# Patient Record
Sex: FEMALE | Race: WHITE | NOT HISPANIC OR LATINO | Employment: OTHER | URBAN - METROPOLITAN AREA
[De-identification: names, ages, dates, MRNs, and addresses within clinical notes are randomized per-mention and may not be internally consistent; named-entity substitution may affect disease eponyms.]

---

## 2017-04-19 ENCOUNTER — TELEPHONE (OUTPATIENT)
Dept: RHEUMATOLOGY | Facility: CLINIC | Age: 72
End: 2017-04-19

## 2017-04-19 NOTE — TELEPHONE ENCOUNTER
Patient called regarding her Plaquenil medication. She said it is needing a Tier exception through her insurance in order to get the cost reduced. Patient is currently out of the medication.     Pharmacy benefit is through [employer] and the number to call is [phone].     Message routed to rheumatology nurses.     Pierre Banuelos RN

## 2017-04-20 NOTE — TELEPHONE ENCOUNTER
Pt returned call, discussed that the tier exception was complete and plaquenil is now at tier 1 for the rest of the year.  Did let her know that her insurance will send her a letter and pharmacy will be made aware when they run her insurance to fill prescription.  Pt understands.  Made her aware that when she called, this was the first we were hearing about this.  Clarified that insurance company had the wrong fax number.  Pt understands and appreciative of fast turn around.    Savanna Fuentes RN  Rheumatology Clinic

## 2017-04-20 NOTE — TELEPHONE ENCOUNTER
Pt is calling again about the below note. Pt states her insurance company has sent multiple requests regarding this and she will be leaving for out of town on Saturday for 6 months and would like a call back today to discuss this. Please contact pt at [phone].

## 2017-04-20 NOTE — TELEPHONE ENCOUNTER
Called [employer] and tier exception was granted.  Prescription has been dropped to Tier 1 for the rest of the benefit year.    Left message requesting that pt return call.    Savanna Fuentes RN  Rheumatology Clinic

## 2017-04-21 NOTE — TELEPHONE ENCOUNTER
Prior Authorization Approval    Authorization Effective Date: 12/31/2016  Authorization Expiration Date: 12/31/2017  Medication: hydroxychloroquine (PLAQUENIL) 200 MG tablet- Tier Exception Approved  Approved Dose/Quantity:   Reference #: [id number]   Insurance Company: [employer] - Phone [phone] Fax [phone]  Expected CoPay: $0.03     CoPay Card Available:      Foundation Assistance Needed:    Which Pharmacy is filling the prescription (Not needed for infusion/clinic administered): [facility] [address] [place] [address] AT [facility]  Pharmacy Notified: Yes  Patient Notified: Yes

## 2017-05-26 ENCOUNTER — TRANSFERRED RECORDS (OUTPATIENT)
Dept: HEALTH INFORMATION MANAGEMENT | Facility: CLINIC | Age: 72
End: 2017-05-26

## 2018-03-13 DIAGNOSIS — M32.9 SYSTEMIC LUPUS ERYTHEMATOSUS (H): ICD-10-CM

## 2018-03-13 DIAGNOSIS — M32.9 SYSTEMIC LUPUS ERYTHEMATOSUS, UNSPECIFIED SLE TYPE, UNSPECIFIED ORGAN INVOLVEMENT STATUS (H): Primary | ICD-10-CM

## 2018-03-13 NOTE — LETTER
Radha Ireland,    Regular eye exams are required while taking Hydroxychloroquine (Plaquenil). These may be yearly or as determined by your eye specialist.    Although vision problems and loss of sight while taking hydroxchloroquine are very rare, notify your doctor if you notice changes in your vision. Visual changes experienced early on the medication or seen early during regular eye exams usually improve after stopping the medication.     Eye exams should be completed by an ophthalmologist who is experienced in monitoring for Hydroxchloroquine toxicity.    Exam may include visual field testing and slit lamp exam or other testing as determined by the ophthalmologist.     We received a refill request from your pharmacy. Your Hydroxychloroquine prescription has been SENT TO [facility] FOR RF AUTHORIZATION. Please request your eye clinic to fax or mail a copy of your eye exam report to our clinic indicating that testing was completed for toxicity screening.    Sincerely,  Rheumatology Staff

## 2018-03-14 RX ORDER — HYDROXYCHLOROQUINE SULFATE 200 MG/1
200 TABLET, FILM COATED ORAL 2 TIMES DAILY
Qty: 180 TABLET | Refills: 0 | Status: SHIPPED | OUTPATIENT
Start: 2018-03-14 | End: 2018-05-25

## 2018-03-14 NOTE — TELEPHONE ENCOUNTER
PLAQUENIL) 200 MG  Last Written Prescription Date:  12/22/16  Last Fill Quantity: 180,   # refills: 3  Last Office Visit : 12/22/16  Future Office visit:  5/25/18  ( 1 YR F/U?)   EYE EXAM   Routing refill request to provider for review/approval because: Last    OVERDUE RTC   SEE CX HISTORY   NO EYE EXAM  LETTER SENT  90 DAY RF?

## 2018-03-19 ENCOUNTER — TELEPHONE (OUTPATIENT)
Dept: RHEUMATOLOGY | Facility: CLINIC | Age: 73
End: 2018-03-19

## 2018-03-19 NOTE — TELEPHONE ENCOUNTER
Patient had a recent refill of plaquenil, and because of the way it is ordered she has a copay of $250. She was told by the pharmacist at [facility] where the prescription went, that the doctor just needs to change the tier level of the medication in order for the copay to decrease as she has been paying. Please call patient when the reorder is complete or for further questions at [phone].

## 2018-03-28 NOTE — TELEPHONE ENCOUNTER
Pt called to check on status of plaquenil tier.    See previous note.    Please callback pt at [phone].

## 2018-03-29 NOTE — TELEPHONE ENCOUNTER
Returned patient call after speaking to pharmacy-[facility]. Patient advised, per [employer] information, patient has a high insur deductible, and that has to be met, so that is why the charge for the med is more. Patient will call and speak to her insur company then. She is also reminded about due for eye exam. She states she has an appt in June for that, but reports she is up to date, had an eye exam last June. Advised we never received a copy of that report to up date her chart. She will call and have that faxed to our office. Fax number is given. She appreciated the call and information about her medication.

## 2018-04-03 NOTE — TELEPHONE ENCOUNTER
Called and spoke to patient. This is a  rheumatology patient that states she was advised her copay went up with her Plaquenil medication. She isnt sure if it needs to be prior authorized, or what is needed. After review of patient chart, it looks like she needed a Prior Authorization for the Plaquenil last year at this time. Patient is given our clinic fax number, and she will call her insur to have the forms for Prior Auth faxed. Explaned to patient will then route to PA team to initiate PA on patient behalf. She is appreciative of the call back and verbalizes understanding of what is needed. Encouraged her to call if further questions or concerns.

## 2018-04-03 NOTE — TELEPHONE ENCOUNTER
----- Message from Silvestre Spear LPN sent at 4/3/2018  8:50 AM CDT -----  Regarding: FW: Hydroxychloroquine Rx      ----- Message -----     From: Beti De Leon     Sent: 4/3/2018   7:44 AM       To: Silvestre Spear LPN  Subject: RE: Hydroxychloroquine Rx                        This needs to go to [facility].   ----- Message -----     From: Silvestre Spear LPN     Sent: 4/2/2018  10:58 AM       To: Beti De Leon  Subject: FW: Hydroxychloroquine Rx                            ----- Message -----     From: Riya Gleason LPN     Sent: 4/2/2018  10:48 AM       To: Silvestre Spear LPN  Subject: FW: Hydroxychloroquine Rx                            ----- Message -----     From: Moi Jaramillo MD     Sent: 3/30/2018   5:25 PM       To: Riya Mckeon LPN  Subject: Hydroxychloroquine Rx                            See prior notes. Somehow she got my office phone from the insurance company and called me. She was told all we had to do was to request a change in tier of this Rx and it would be covered at $6.00.  This doesn't jive with my experience in how they respond to our requests. I am glad to help her however I can, but need to have someone determine exactly what we can do. gb

## 2018-04-26 ENCOUNTER — TELEPHONE (OUTPATIENT)
Dept: RHEUMATOLOGY | Facility: CLINIC | Age: 73
End: 2018-04-26

## 2018-04-26 DIAGNOSIS — M32.9 SYSTEMIC LUPUS ERYTHEMATOSUS, UNSPECIFIED SLE TYPE, UNSPECIFIED ORGAN INVOLVEMENT STATUS (H): Primary | ICD-10-CM

## 2018-04-26 RX ORDER — PREDNISONE 5 MG/1
TABLET ORAL
Qty: 30 TABLET | Refills: 0 | Status: SHIPPED | OUTPATIENT
Start: 2018-04-26 | End: 2018-10-04

## 2018-04-26 NOTE — TELEPHONE ENCOUNTER
Pt states that she was doing well on plaquenil, when she stopped 2 weeks ago, as her cost for the medication went up to over $200, and pt has been trying to get a tier exception.    She states that she has joint pain all day now, since she has been off.  She has a swollen right hand, with stiffness that lasts all day.  She is unable to do a lot now due to the stiffness and joint pain.  This is getting worse. Pt needs to avoid NSAIDs due to mild CKD.    We did discuss getting her back on her plaquenil, discussed [facility] as they have a coupon that can help with cost for at least the next month.  She will go get that filled.  But she is wondering if there is something to help with the pain and stiffness in her joints now.  She is wondering if prednisone would help her.     Pt was last seen 12/2016:  70 y/o female with +KATIE, +Ds DNA, hypocomplementemia, malar rash, alopecia and sicca symptoms, arthritis and mild renal insufficiency.   She has SLE with secondary sjogren's syndrome (+siccs sxs, +schirmer test, - minor salivary gland biopsy).   Her symptoms are controlled reasonably on Plaquenil. Her sicca sx are stable with biotene and artificial tears. Eye and dental exams are current.   2. Recurrent dyspnea, evaluated by outside Cardiologist and Pulmonologist in past. By her description sounds as though she may have diastolic dysfunction. This is stable at this time.   3. Recurrent falls with hx of remote CVA, and ? Changes in speech. Exam is non focal. I do not think this is SLE related as before  4. Primary OA of hands, with known DJD elsewhere with persistent symptoms  5. Mild CKD, stable creatinines  Plan: Discussed in detail with the patient   1. Continue plaquenil 200 mg po BID. Discussed opthalmology exam once a year while on plaquenil.   2. Labs ordered today  3. Follow up with PCP as needed  4. Continue agents for sicca sx.   5. Return in 12 months   6. She has had the flu shot   7. We discussed symptomatic  treatment for DJD, avoidance of NSAIDs  Moi Jaramillo MD       Pt will see Dr. Jaramillo on 5/28, rescheduled due to provider illness in December and January.  Provider is currently out of the office this week.  Will send to on call provider.    Savanna Fuentes RN  Rheumatology Clinic

## 2018-04-26 NOTE — TELEPHONE ENCOUNTER
Date: 4/26/2018    Time of Call: 3:58 PM     Diagnosis:  SLE     [ VORB ] Ordering provider: Dr. Loco   Order: Prednisone 5 mg tablets, take 3 tabs/day x 5 days, if not better call, if better take 2 tab/day x 3-5 days, then 1 tab/day x 3-5 days, then stop     Order received by: Savanna Fuentes RN     Follow-up/additional notes: Call if not better in 5 days.    Savanna Fuentes RN  Rheumatology Clinic

## 2018-04-27 NOTE — TELEPHONE ENCOUNTER
Called and spoke with pt regarding prednisone taper. Discussed I will keep working on tier exception for plaquenil.    Savanna Fuentes RN  Rheumatology Clinic

## 2018-05-11 ENCOUNTER — TELEPHONE (OUTPATIENT)
Dept: RHEUMATOLOGY | Facility: CLINIC | Age: 73
End: 2018-05-11

## 2018-05-11 NOTE — TELEPHONE ENCOUNTER
[facility] Call Center    Phone Message    May a detailed message be left on voicemail: no    Reason for Call: Other: Returning Savanna's call from yesterday     Action Taken: Message routed to:  [facility] Rheum

## 2018-05-15 NOTE — TELEPHONE ENCOUNTER
Left message letting pt know that I will call her when I hear something.      Savanna Fuentes RN  Rheumatology Clinic

## 2018-05-23 NOTE — TELEPHONE ENCOUNTER
Called and spoke with pt, advised her that she was granted a tier exception for her plaquenil.  She has been dropped to tier 2 from tier 3.  Pt is very grateful, did purchase last months dose with [facility], so it made is slightly more affordable.    Savanna Fuentes RN  Rheumatology Clinic

## 2018-05-25 ENCOUNTER — OFFICE VISIT (OUTPATIENT)
Dept: RHEUMATOLOGY | Facility: CLINIC | Age: 73
End: 2018-05-25
Attending: INTERNAL MEDICINE
Payer: MEDICARE

## 2018-05-25 VITALS
DIASTOLIC BLOOD PRESSURE: 79 MMHG | OXYGEN SATURATION: 98 % | SYSTOLIC BLOOD PRESSURE: 124 MMHG | HEIGHT: 64 IN | HEART RATE: 89 BPM

## 2018-05-25 DIAGNOSIS — M32.9 SYSTEMIC LUPUS ERYTHEMATOSUS, UNSPECIFIED SLE TYPE, UNSPECIFIED ORGAN INVOLVEMENT STATUS (H): ICD-10-CM

## 2018-05-25 DIAGNOSIS — Z79.899 ENCOUNTER FOR LONG-TERM CURRENT USE OF MEDICATION: Primary | ICD-10-CM

## 2018-05-25 PROCEDURE — G0463 HOSPITAL OUTPT CLINIC VISIT: HCPCS | Mod: ZF

## 2018-05-25 RX ORDER — HYDROXYCHLOROQUINE SULFATE 200 MG/1
200 TABLET, FILM COATED ORAL 2 TIMES DAILY
Qty: 180 TABLET | Refills: 3 | Status: SHIPPED | OUTPATIENT
Start: 2018-05-25 | End: 2019-05-25

## 2018-05-25 ASSESSMENT — PAIN SCALES - GENERAL: PAINLEVEL: NO PAIN (0)

## 2018-05-25 NOTE — NURSING NOTE
"Chief Complaint   Patient presents with     RECHECK     Lupus follow up     ./79  Pulse 89  Ht 1.626 m (5' 4\")  SpO2 98%  SUSY ANAYA CMA    "

## 2018-05-25 NOTE — LETTER
5/25/2018      RE: Shu Duckworth  [address] [place] [address]       Rheumatology Visit     Shu Duckworth MRN# [id number]   YOB: 1945 Age: 72 year old     Date of Visit: May 25, 2018  Primary care provider: Johnson Arnold MD          Assessment and Plan:   71 y/o female with +KATIE, +Ds DNA, hypocomplementemia, malar rash, alopecia and sicca symptoms, arthritis and mild renal insufficiency.   She has SLE with secondary sjogren's syndrome (+siccs sxs, +schirmer test, - minor salivary gland biopsy).   Her symptoms are controlled reasonably on Plaquenil. She did have exacerbation of symptoms off the medication due to insurance issues, and is better back on it.  Her sicca sx are stable with biotene and artificial tears. Eye and dental exams are current.   2. Recurrent dyspnea, evaluated by outside Cardiologist and Pulmonologist in past. By her description sounds as though she may have diastolic dysfunction. This is stable at this time.   3. Recurrent falls with hx of remote CVA, and ? Changes in speech. Exam is non focal. I do not think this is SLE related as before  4. Primary OA of hands, with known DJD elsewhere with persistent symptoms  5. Mild CKD, stable creatinines  Plan: Discussed in detail with the patient   1. Continue plaquenil 200 mg po BID. Discussed opthalmology exam once a year while on plaquenil.  Scheduled in June   2. Labs current per Care Everywhere  3. Follow up with PCP as needed  4. Continue agents for sicca sx.   5. Return in 6 months   6. She has had the flu shot   7. We discussed symptomatic treatment for DJD, avoidance of NSAIDs  Moi Jaramillo MD             History of Present Illness:   71 y/o female who returns for follow-up of SLE and secondary Sjogrens (diagnosed in 2010). She was last seen by me in December 2016.    HISTORY CARRIED FORWARD:   To review, she initially presented with nail dystrophy to her dermatologist who thought she may have a lichen  "planopilaris of the nails but given her + KATIE at 1/640 speckeled pattern and + FH of Sjogrens advised her to obtain a rheumatology evaluation. Her daughter has Primary seropositive Sjogrens and also now has autoimmune pancreatitis. At her initial rheumatology evaluation in June 2010, she had significant sicca symptoms (subjective and objective), a faint malar rash, along with a + FH of Sjogrens overall features suggestive of Sjogren's. She was refered to ophthalmology for a schirmer test and a MSG biopsy. Schirmer was abnormal; biopsy showed chronic changes but did not meet criteria for Sjogrens. Labs notable for positive KATIE (1.2), positive dsDNA (35), low C4 (9), negative SAMANTHA, RF, cryoglobulins. She was started on Plaquenil 200 mg BID in July 2010.   Previously her primary physician evaluated her for new symptoms of shortness of breath with activities. She was noticing that when she climbed the stairs or did routine household chores, she was becoming more short of breath than usual. She denies any chest pain, orthopnea, PND, syncope or cough. This work-up has been done at [facility] and [facility] and records are unavailable to us. She states the cardiologist told her the heart muscle at the top of the heart didn't relax to let fluid in from the bottom. She is on a beta blocker and HCTZ; apparently had another medication, ? Diuretic she did not tolerated. She has intermittent dyspnea not changing. She recently saw her PCP. She has mild stable increased Creatinine.  She recounts a history of intermittent falls that dates back to at least 2010. She was evaluated at that time by a neurologist. MRI apparently consistent with prior CVA. She notes no recent increase in falls but yesterday \"stumbled\" while playing with her grandchildren. She denies dizziness, vertigo, presyncope, syncope. She has had others note a change in her speech, ? Slurred. No bowel or bladder sx.  INTERVAL HISTORY:  Lab last visit a " year ago was stable with mildly low C4, increased Cr of 1.17.  She missed an interval appointment.  She called recently that her Plaquenil was put on a different tier and she couldn't afford it.  She was requiring some Prednisone for joint pain. We just got a tier exception less than a week ago and she is already improving.  She had a lot more arthritis in hands R>L with swelling.   Her joint pain has generally otherwise been stable. She has intermittent pain in her elbows L>R with prior L elbow arthroplasty, hands and some AM stiffness which is improved after only a short time.  She has pain and a lump R 2nd DIP consistent with Heberden's node with other non tender nodes. She has chronic L ankle pain with prior fracture and surgery then removal of hardware.   She has continued sicca symptoms which are improved by using Biotene and artificial tears. She sees her dentist regularly.   Patient denies Raynaud's, pleuritic chest pain or mouth ulcers. No recent fever, chills or rigors.   They will go to [place] again this winter.       Review of Systems:   Review Of Systems  Skin: negative  Eyes: negative  Ears/Nose/Throat: see HPI  Respiratory: No shortness of breath, dyspnea on exertion, cough, or hemoptysis  Cardiovascular: negative  Gastrointestinal: negative  Genitourinary: negative  Musculoskeletal: see HPI  Neurologic: negative  Psychiatric: negative  Hematologic/Lymphatic/Immunologic: negative  Endocrine: negative          Past Medical History:   No past medical history on file.    Patient Active Problem List    Diagnosis Date Noted     SLE (systemic lupus erythematosus) (H) 10/12/2011     Priority: Medium     Encounter for long-term current use of medication 10/12/2011     Priority: Medium     Problem list name updated by automated process. Provider to review       Sjogren's syndrome (H) 10/12/2011     Priority: Medium             Past Surgical History:   No past surgical history on file.          Social History:  "    Social History   Substance Use Topics     Smoking status: Former Smoker     Packs/day: 0.50     Smokeless tobacco: Never Used     Alcohol use Yes      Comment: Occasional use             Family History:     Family History   Problem Relation Age of Onset     Sjogren's Daughter             Allergies:     Allergies   Allergen Reactions     Dust Mite Extract      Sneezing, itchy nose.     Penicillins Rash     Sulfa Drugs Rash             Medications:     Current Outpatient Prescriptions   Medication Sig Dispense Refill     Acetaminophen (TYLENOL PO) Take 1,000 mg by mouth every 6 hours as needed for mild pain or fever       atenolol (TENORMIN) 25 MG tablet 2 tablets daily. One in the morning, one at bedtime.       ClonAZEPAM (KLONOPIN) 0.5 MG tablet Take 3 tabs at night as needed       cyclobenzaprine (FLEXERIL) 10 MG tablet Take 1 tablet 3 times daily as needed       hydrochlorothiazide (HYDRODIURIL) 50 MG tablet Take 25 mg by mouth daily.       hydroxychloroquine (PLAQUENIL) 200 MG tablet Take 1 tablet (200 mg) by mouth 2 times daily *PLEASE, SCHEDULE EYE EXAM* 180 tablet 0     levothyroxine (SYNTHROID, LEVOTHROID) 88 MCG tablet Take one daily       methylphenidate (CONCERTA) 54 MG CR tablet Take 1 tablet by mouth daily.       mometasone (NASONEX) 50 MCG/ACT nasal spray 1 spray in each nostril once daily       Multiple Vitamins-Minerals (CENTRUM SILVER PO) Take  by mouth.       pantoprazole (PROTONIX) 40 MG enteric coated tablet Take 1 tablet by mouth 2 times daily.       predniSONE (DELTASONE) 5 MG tablet Take 3 tabs(15mg) daily x 5 days, if not better call, if better 2 tabs(10mg) daily x 3-5 days, then 1 tab(5 mg) daily x 3-5 days then stop. 30 tablet 0     ranitidine (ZANTAC) 150 MG tablet Take one at bedtime daily       simvastatin (ZOCOR) 20 MG tablet Take one tab daily              Physical Exam:   /79  Pulse 89  Ht 1.626 m (5' 4\")  SpO2 98%  Constitutional: WD-WN-WG cooperative   Eyes: nl EOM, " conjunctiva, sclera   ENT: nl external ears, nose, throat   No mucous membrane lesions, diminished saliva pool   Neck: no mass or thyroid enlargement   GI: no ABD mass or tenderness, no HSM   Lymph: no cervical, supraclavicular, inguinal or epitrochlear nodes   MS: All TMJ, neck, shoulder, elbow, wrist, MCP/PIP/DIP, spine, hip, knee, ankle, and foot MTP/IP joints were examined and found without definite active synovitis. She has a mildly tender Heberden's node R 2nd finger with other developed non tender Heberden and Giselle nodes. No instability L thumb IP. 1+S and pain with finger curl R 2nd and 3rd fingers. Lacks 10+ degrees extension L elbow. Bilat TKR scars. No dactylitis, tenosynovitis, enthesopathy   Skin: no nail pitting, alopecia, rash, nodules or lesions   Neuro: nl cranial nerves, strength   Psych: nl affect.           Data:     Lab Results   Component Value Date    WBC 7.3 12/22/2016    WBC 5.3 12/23/2015    WBC 5.2 12/22/2014    HGB 13.6 12/22/2016    HGB 13.2 12/23/2015    HGB 13.8 12/22/2014    HCT 42.1 12/22/2016    HCT 41.0 12/23/2015    HCT 42.6 12/22/2014    MCV 95 12/22/2016    MCV 95 12/23/2015    MCV 96 12/22/2014     12/22/2016     12/23/2015     12/22/2014     Lab Results   Component Value Date    BUN 14 06/13/2012    BUN 22 05/16/2012    BUN 21 06/16/2010     No components found for: SEDRATE  Lab Results   Component Value Date    TSH 1.85 06/16/2010     Lab Results   Component Value Date    AST 25 12/22/2016    AST 22 12/23/2015    AST 23 12/22/2014    ALT 27 12/22/2016    ALT 24 12/23/2015    ALT 30 12/22/2014    ALKPHOS 104 05/16/2012     Reviewed Rheumatology lab flowsheet        Moi Jaramillo MD

## 2018-05-25 NOTE — MR AVS SNAPSHOT
After Visit Summary   5/25/2018    Shu Duckworth    MRN: [id number]           Patient Information     Date Of Birth          1945        Visit Information        Provider Department      5/25/2018 12:30 PM Moi Jaramillo MD [facility] Rhe[facility]ology        Today's Diagnoses     Encounter for long-term current use of medication    -  1    Systemic lupus erythematosus, unspecified SLE type, unspecified organ involvement status (H)           Follow-ups after your visit        Follow-up notes from your care team     Return in about 6 months (around 11/25/2018).      Who to contact     If you have questions or need follow up information about today's clinic visit or your schedule please contact [facility] RHEUMATOLOGY directly at [phone].  Normal or non-critical lab and imaging results will be communicated to you by SFOXhart, letter or phone within 4 business days after the clinic has received the results. If you do not hear from us within 7 days, please contact the clinic through SFOXhart or phone. If you have a critical or abnormal lab result, we will notify you by phone as soon as possible.  Submit refill requests through Topadmit or call your pharmacy and they will forward the refill request to us. Please allow 3 business days for your refill to be completed.          Additional Information About Your Visit        MyChart Information     Topadmit gives you secure access to your electronic health record. If you see a primary care provider, you can also send messages to your care team and make appointments. If you have questions, please call your primary care clinic.  If you do not have a primary care provider, please call [phone] and they will assist you.        Care EveryWhere ID     This is your Care EveryWhere ID. This could be used by other organizations to access your [facility] medical records  [id number]        Your Vitals Were     Pulse Height Pulse Oximetry             89 1.626 m (5'  "4\") 98%          Blood Pressure from Last 3 Encounters:   05/25/18 124/79   12/22/16 130/81   12/23/15 119/44    Weight from Last 3 Encounters:   12/22/16 93.9 kg (207 lb)   12/23/15 93.9 kg (207 lb)   06/24/15 91.2 kg (201 lb)              Today, you had the following     No orders found for display         Where to get your medicines      These medications were sent to [id number] - [place], [place] - [address] AT [facility] [address]  [address], [place] [address]     Phone:  [phone]     hydroxychloroquine 200 MG tablet          Primary Care Provider Office Phone # Fax #    Johnson EDGARDO Arnold MD, [phone]       [address] [address]        Equal Access to Services     HECTOR CAMPOS AH: Hadii aad ku hadasho Soomaali, waaxda luqadaha, qaybta kaalmada adeegyada, waxay idiin hayaan samson thompson . So [facility] [phone].    ATENCIÓN: Si olivia penny, tiene a salgado disposición servicios gratuitos de asistencia lingüística. Llame al [phone].    We comply with applicable federal civil rights laws and [place] laws. We do not discriminate on the basis of race, color, national origin, age, disability, sex, sexual orientation, or gender identity.            Thank you!     Thank you for choosing [facility] RHEUMATOLOGY  for your care. Our goal is always to provide you with excellent care. Hearing back from our patients is one way we can continue to improve our services. Please take a few minutes to complete the written survey that you may receive in the mail after your visit with us. Thank you!             Your Updated Medication List - Protect others around you: Learn how to safely use, store and throw away your medicines at www.disposemymeds.org.          This list is accurate as of 5/25/18  1:25 PM.  Always use your most recent med list.                   Brand Name Dispense Instructions for use " Diagnosis    atenolol 25 MG tablet    TENORMIN     2 tablets daily. One in the morning, one at bedtime.    SLE (systemic lupus erythematosus) (H), Encounter for long-term (current) use of other medications       CENTRUM SILVER PO      Take  by mouth.        clonazePAM 0.5 MG tablet    klonoPIN     Take 3 tabs at night as needed    SLE (systemic lupus erythematosus) (H), Encounter for long-term (current) use of other medications       CONCERTA 54 MG CR tablet   Generic drug:  methylphenidate ER      Take 1 tablet by mouth daily.        FLEXERIL 10 MG tablet   Generic drug:  cyclobenzaprine      Take 1 tablet 3 times daily as needed    SLE (systemic lupus erythematosus) (H), Encounter for long-term (current) use of other medications       hydrochlorothiazide 50 MG tablet    HYDRODIURIL     Take 25 mg by mouth daily.    SLE (systemic lupus erythematosus) (H), Encounter for long-term (current) use of other medications       hydroxychloroquine 200 MG tablet    PLAQUENIL    180 tablet    Take 1 tablet (200 mg) by mouth 2 times daily *PLEASE, SCHEDULE EYE EXAM*    Systemic lupus erythematosus, unspecified SLE type, unspecified organ involvement status (H)       levothyroxine 88 MCG tablet    SYNTHROID/LEVOTHROID     Take one daily    SLE (systemic lupus erythematosus) (H), Encounter for long-term (current) use of other medications       NASONEX 50 MCG/ACT spray   Generic drug:  mometasone      1 spray in each nostril once daily    SLE (systemic lupus erythematosus) (H), Encounter for long-term (current) use of other medications       predniSONE 5 MG tablet    DELTASONE    30 tablet    Take 3 tabs(15mg) daily x 5 days, if not better call, if better 2 tabs(10mg) daily x 3-5 days, then 1 tab(5 mg) daily x 3-5 days then stop.    Systemic lupus erythematosus, unspecified SLE type, unspecified organ involvement status (H)       PROTONIX 40 MG EC tablet   Generic drug:  pantoprazole      Take 1 tablet by mouth 2 times daily.     SLE (systemic lupus erythematosus) (H), Encounter for long-term (current) use of other medications       simvastatin 20 MG tablet    ZOCOR     Take one tab daily    SLE (systemic lupus erythematosus) (H), Encounter for long-term (current) use of other medications       TYLENOL PO      Take 1,000 mg by mouth every 6 hours as needed for mild pain or fever        ZANTAC 150 MG tablet   Generic drug:  ranitidine      Take one at bedtime daily    SLE (systemic lupus erythematosus) (H), Encounter for long-term (current) use of other medications

## 2018-05-25 NOTE — PROGRESS NOTES
Rheumatology Visit     Shu Duckworth MRN# [id number]   YOB: 1945 Age: 72 year old     Date of Visit: May 25, 2018  Primary care provider: Johnson Arnold MD          Assessment and Plan:   71 y/o female with +KATIE, +Ds DNA, hypocomplementemia, malar rash, alopecia and sicca symptoms, arthritis and mild renal insufficiency.   She has SLE with secondary sjogren's syndrome (+siccs sxs, +schirmer test, - minor salivary gland biopsy).   Her symptoms are controlled reasonably on Plaquenil. She did have exacerbation of symptoms off the medication due to insurance issues, and is better back on it.  Her sicca sx are stable with biotene and artificial tears. Eye and dental exams are current.   2. Recurrent dyspnea, evaluated by outside Cardiologist and Pulmonologist in past. By her description sounds as though she may have diastolic dysfunction. This is stable at this time.   3. Recurrent falls with hx of remote CVA, and ? Changes in speech. Exam is non focal. I do not think this is SLE related as before  4. Primary OA of hands, with known DJD elsewhere with persistent symptoms  5. Mild CKD, stable creatinines  Plan: Discussed in detail with the patient   1. Continue plaquenil 200 mg po BID. Discussed opthalmology exam once a year while on plaquenil.  Scheduled in June   2. Labs current per Care Everywhere  3. Follow up with PCP as needed  4. Continue agents for sicca sx.   5. Return in 6 months   6. She has had the flu shot   7. We discussed symptomatic treatment for DJD, avoidance of NSAIDs  Moi Jaramillo MD             History of Present Illness:   71 y/o female who returns for follow-up of SLE and secondary Sjogrens (diagnosed in 2010). She was last seen by me in December 2016.    HISTORY CARRIED FORWARD:   To review, she initially presented with nail dystrophy to her dermatologist who thought she may have a lichen planopilaris of the nails but given her + KATIE at 1/640 speckeled pattern and + FH of  "Sjogrens advised her to obtain a rheumatology evaluation. Her daughter has Primary seropositive Sjogrens and also now has autoimmune pancreatitis. At her initial rheumatology evaluation in June 2010, she had significant sicca symptoms (subjective and objective), a faint malar rash, along with a + FH of Sjogrens overall features suggestive of Sjogren's. She was refered to ophthalmology for a schirmer test and a MSG biopsy. Schirmer was abnormal; biopsy showed chronic changes but did not meet criteria for Sjogrens. Labs notable for positive KATIE (1.2), positive dsDNA (35), low C4 (9), negative SAMANTHA, RF, cryoglobulins. She was started on Plaquenil 200 mg BID in July 2010.   Previously her primary physician evaluated her for new symptoms of shortness of breath with activities. She was noticing that when she climbed the stairs or did routine household chores, she was becoming more short of breath than usual. She denies any chest pain, orthopnea, PND, syncope or cough. This work-up has been done at [facility] and [facility] and records are unavailable to us. She states the cardiologist told her the heart muscle at the top of the heart didn't relax to let fluid in from the bottom. She is on a beta blocker and HCTZ; apparently had another medication, ? Diuretic she did not tolerated. She has intermittent dyspnea not changing. She recently saw her PCP. She has mild stable increased Creatinine.  She recounts a history of intermittent falls that dates back to at least 2010. She was evaluated at that time by a neurologist. MRI apparently consistent with prior CVA. She notes no recent increase in falls but yesterday \"stumbled\" while playing with her grandchildren. She denies dizziness, vertigo, presyncope, syncope. She has had others note a change in her speech, ? Slurred. No bowel or bladder sx.  INTERVAL HISTORY:  Lab last visit a year ago was stable with mildly low C4, increased Cr of 1.17.  She missed an interval " appointment.  She called recently that her Plaquenil was put on a different tier and she couldn't afford it.  She was requiring some Prednisone for joint pain. We just got a tier exception less than a week ago and she is already improving.  She had a lot more arthritis in hands R>L with swelling.   Her joint pain has generally otherwise been stable. She has intermittent pain in her elbows L>R with prior L elbow arthroplasty, hands and some AM stiffness which is improved after only a short time.  She has pain and a lump R 2nd DIP consistent with Heberden's node with other non tender nodes. She has chronic L ankle pain with prior fracture and surgery then removal of hardware.   She has continued sicca symptoms which are improved by using Biotene and artificial tears. She sees her dentist regularly.   Patient denies Raynaud's, pleuritic chest pain or mouth ulcers. No recent fever, chills or rigors.   They will go to [place] again this winter.       Review of Systems:   Review Of Systems  Skin: negative  Eyes: negative  Ears/Nose/Throat: see HPI  Respiratory: No shortness of breath, dyspnea on exertion, cough, or hemoptysis  Cardiovascular: negative  Gastrointestinal: negative  Genitourinary: negative  Musculoskeletal: see HPI  Neurologic: negative  Psychiatric: negative  Hematologic/Lymphatic/Immunologic: negative  Endocrine: negative          Past Medical History:   No past medical history on file.    Patient Active Problem List    Diagnosis Date Noted     SLE (systemic lupus erythematosus) (H) 10/12/2011     Priority: Medium     Encounter for long-term current use of medication 10/12/2011     Priority: Medium     Problem list name updated by automated process. Provider to review       Sjogren's syndrome (H) 10/12/2011     Priority: Medium             Past Surgical History:   No past surgical history on file.          Social History:     Social History   Substance Use Topics     Smoking status: Former Smoker      "Packs/day: 0.50     Smokeless tobacco: Never Used     Alcohol use Yes      Comment: Occasional use             Family History:     Family History   Problem Relation Age of Onset     Sjogren's Daughter             Allergies:     Allergies   Allergen Reactions     Dust Mite Extract      Sneezing, itchy nose.     Penicillins Rash     Sulfa Drugs Rash             Medications:     Current Outpatient Prescriptions   Medication Sig Dispense Refill     Acetaminophen (TYLENOL PO) Take 1,000 mg by mouth every 6 hours as needed for mild pain or fever       atenolol (TENORMIN) 25 MG tablet 2 tablets daily. One in the morning, one at bedtime.       ClonAZEPAM (KLONOPIN) 0.5 MG tablet Take 3 tabs at night as needed       cyclobenzaprine (FLEXERIL) 10 MG tablet Take 1 tablet 3 times daily as needed       hydrochlorothiazide (HYDRODIURIL) 50 MG tablet Take 25 mg by mouth daily.       hydroxychloroquine (PLAQUENIL) 200 MG tablet Take 1 tablet (200 mg) by mouth 2 times daily *PLEASE, SCHEDULE EYE EXAM* 180 tablet 0     levothyroxine (SYNTHROID, LEVOTHROID) 88 MCG tablet Take one daily       methylphenidate (CONCERTA) 54 MG CR tablet Take 1 tablet by mouth daily.       mometasone (NASONEX) 50 MCG/ACT nasal spray 1 spray in each nostril once daily       Multiple Vitamins-Minerals (CENTRUM SILVER PO) Take  by mouth.       pantoprazole (PROTONIX) 40 MG enteric coated tablet Take 1 tablet by mouth 2 times daily.       predniSONE (DELTASONE) 5 MG tablet Take 3 tabs(15mg) daily x 5 days, if not better call, if better 2 tabs(10mg) daily x 3-5 days, then 1 tab(5 mg) daily x 3-5 days then stop. 30 tablet 0     ranitidine (ZANTAC) 150 MG tablet Take one at bedtime daily       simvastatin (ZOCOR) 20 MG tablet Take one tab daily              Physical Exam:   /79  Pulse 89  Ht 1.626 m (5' 4\")  SpO2 98%  Constitutional: WD-WN-WG cooperative   Eyes: nl EOM, conjunctiva, sclera   ENT: nl external ears, nose, throat   No mucous membrane " lesions, diminished saliva pool   Neck: no mass or thyroid enlargement   GI: no ABD mass or tenderness, no HSM   Lymph: no cervical, supraclavicular, inguinal or epitrochlear nodes   MS: All TMJ, neck, shoulder, elbow, wrist, MCP/PIP/DIP, spine, hip, knee, ankle, and foot MTP/IP joints were examined and found without definite active synovitis. She has a mildly tender Heberden's node R 2nd finger with other developed non tender Heberden and Giselle nodes. No instability L thumb IP. 1+S and pain with finger curl R 2nd and 3rd fingers. Lacks 10+ degrees extension L elbow. Bilat TKR scars. No dactylitis, tenosynovitis, enthesopathy   Skin: no nail pitting, alopecia, rash, nodules or lesions   Neuro: nl cranial nerves, strength   Psych: nl affect.           Data:     Lab Results   Component Value Date    WBC 7.3 12/22/2016    WBC 5.3 12/23/2015    WBC 5.2 12/22/2014    HGB 13.6 12/22/2016    HGB 13.2 12/23/2015    HGB 13.8 12/22/2014    HCT 42.1 12/22/2016    HCT 41.0 12/23/2015    HCT 42.6 12/22/2014    MCV 95 12/22/2016    MCV 95 12/23/2015    MCV 96 12/22/2014     12/22/2016     12/23/2015     12/22/2014     Lab Results   Component Value Date    BUN 14 06/13/2012    BUN 22 05/16/2012    BUN 21 06/16/2010     No components found for: SEDRATE  Lab Results   Component Value Date    TSH 1.85 06/16/2010     Lab Results   Component Value Date    AST 25 12/22/2016    AST 22 12/23/2015    AST 23 12/22/2014    ALT 27 12/22/2016    ALT 24 12/23/2015    ALT 30 12/22/2014    ALKPHOS 104 05/16/2012     Reviewed Rheumatology lab flowsheet    Moi Jaramillo

## 2018-06-01 ENCOUNTER — TRANSFERRED RECORDS (OUTPATIENT)
Dept: HEALTH INFORMATION MANAGEMENT | Facility: CLINIC | Age: 73
End: 2018-06-01

## 2018-10-04 ENCOUNTER — OFFICE VISIT (OUTPATIENT)
Dept: RHEUMATOLOGY | Facility: CLINIC | Age: 73
End: 2018-10-04
Attending: INTERNAL MEDICINE
Payer: MEDICARE

## 2018-10-04 VITALS
SYSTOLIC BLOOD PRESSURE: 111 MMHG | BODY MASS INDEX: 36.35 KG/M2 | OXYGEN SATURATION: 99 % | RESPIRATION RATE: 16 BRPM | DIASTOLIC BLOOD PRESSURE: 65 MMHG | HEIGHT: 64 IN | HEART RATE: 95 BPM | WEIGHT: 212.9 LBS

## 2018-10-04 DIAGNOSIS — M32.8 OTHER FORMS OF SYSTEMIC LUPUS ERYTHEMATOSUS, UNSPECIFIED ORGAN INVOLVEMENT STATUS (H): ICD-10-CM

## 2018-10-04 DIAGNOSIS — Z79.899 ENCOUNTER FOR LONG-TERM CURRENT USE OF MEDICATION: Primary | ICD-10-CM

## 2018-10-04 DIAGNOSIS — Z79.899 ENCOUNTER FOR LONG-TERM CURRENT USE OF MEDICATION: ICD-10-CM

## 2018-10-04 PROCEDURE — 36415 COLL VENOUS BLD VENIPUNCTURE: CPT | Performed by: INTERNAL MEDICINE

## 2018-10-04 PROCEDURE — 86160 COMPLEMENT ANTIGEN: CPT | Performed by: INTERNAL MEDICINE

## 2018-10-04 PROCEDURE — G0463 HOSPITAL OUTPT CLINIC VISIT: HCPCS | Mod: ZF

## 2018-10-04 PROCEDURE — 86225 DNA ANTIBODY NATIVE: CPT | Performed by: INTERNAL MEDICINE

## 2018-10-04 RX ORDER — DEXTROAMPHETAMINE SACCHARATE, AMPHETAMINE ASPARTATE, DEXTROAMPHETAMINE SULFATE AND AMPHETAMINE SULFATE 5; 5; 5; 5 MG/1; MG/1; MG/1; MG/1
30 TABLET ORAL 2 TIMES DAILY
COMMUNITY
Start: 2018-09-24

## 2018-10-04 RX ORDER — METOPROLOL SUCCINATE 50 MG/1
50 TABLET, EXTENDED RELEASE ORAL DAILY
COMMUNITY
Start: 2018-06-19

## 2018-10-04 RX ORDER — TRAMADOL HYDROCHLORIDE 50 MG/1
50 TABLET ORAL EVERY 6 HOURS PRN
COMMUNITY
Start: 2018-10-30

## 2018-10-04 RX ORDER — ROPINIROLE 1 MG/1
TABLET, FILM COATED ORAL
COMMUNITY
Start: 2018-07-30

## 2018-10-04 RX ORDER — LEVOTHYROXINE SODIUM 100 UG/1
100 TABLET ORAL DAILY
Refills: 3 | COMMUNITY
Start: 2018-07-25

## 2018-10-04 RX ORDER — SERTRALINE HYDROCHLORIDE 100 MG/1
150 TABLET, FILM COATED ORAL EVERY MORNING
COMMUNITY
Start: 2018-07-30

## 2018-10-04 ASSESSMENT — PAIN SCALES - GENERAL: PAINLEVEL: NO PAIN (1)

## 2018-10-04 NOTE — LETTER
10/4/2018       RE: Shu Duckworth  [address]  [facility] [address]     Dear Colleague,    Thank you for referring your patient, Shu Duckworth, to the [facility] RHEUMATOLOGY at [facility]. Please see a copy of my visit note below.    Rheumatology Visit     Shu Duckworth MRN# [id number]   YOB: 1945 Age: 73 year old     Date of Visit: October 4, 2018  Primary care provider: Johnson Arnold MD          Assessment and Plan:   72 y/o female with +KATIE, +Ds DNA, hypocomplementemia, malar rash, alopecia and sicca symptoms, arthritis and mild renal insufficiency.   She has SLE with secondary sjogren's syndrome (+siccs sxs, +schirmer test, - minor salivary gland biopsy).   Her symptoms are controlled reasonably on Plaquenil. She did have exacerbation of symptoms off the medication due to insurance issues, and is better back on it.  Her sicca sx are stable with biotene and artificial tears. Eye and dental exams are current.   2. Recurrent dyspnea, evaluated by outside Cardiologist and Pulmonologist in past. By her description sounds as though she may have diastolic dysfunction. This is stable at this time.   3. Recurrent falls with hx of remote CVA, and ? Changes in speech. Exam is non focal. I do not think this is SLE related as before  4. Primary OA of hands, with known DJD elsewhere with persistent symptoms  5. Mild CKD, stable creatinines  6. S/P surgery for cecal volvulus  Plan: Discussed in detail with the patient   1. Continue plaquenil 200 mg po BID. Discussed opthalmology exam once a year while on plaquenil.     2. Labs current per Care Everywhere. We will do complements and DNA today and write  3. Follow up with PCP as needed  4. Continue agents for sicca sx.   5. Return in 6 months   6. She has had the flu shot this fall; she wonders about Shingles shot; she had by records Zostrix in 2012. Recommended she discuss with PCP next visit and consider  Shingrix as booster/more effective vaccine; it is in shortage right now  7. We discussed symptomatic treatment for DJD, avoidance of NSAIDs  Moi Jaramillo MD           History of Present Illness:   74 y/o female who returns for follow-up of SLE and secondary Sjogrens (diagnosed in 2010). She was last seen by me in May 2018.    HISTORY CARRIED FORWARD:   To review, she initially presented with nail dystrophy to her dermatologist who thought she may have a lichen planopilaris of the nails but given her + KATIE at 1/640 speckeled pattern and + FH of Sjogrens advised her to obtain a rheumatology evaluation. Her daughter has Primary seropositive Sjogrens and also now has autoimmune pancreatitis. At her initial rheumatology evaluation in June 2010, she had significant sicca symptoms (subjective and objective), a faint malar rash, along with a + FH of Sjogrens overall features suggestive of Sjogren's. She was refered to ophthalmology for a schirmer test and a MSG biopsy. Schirmer was abnormal; biopsy showed chronic changes but did not meet criteria for Sjogrens. Labs notable for positive KATIE (1.2), positive dsDNA (35), low C4 (9), negative SAMANTHA, RF, cryoglobulins. She was started on Plaquenil 200 mg BID in July 2010.   Previously her primary physician evaluated her for new symptoms of shortness of breath with activities. She was noticing that when she climbed the stairs or did routine household chores, she was becoming more short of breath than usual. She denies any chest pain, orthopnea, PND, syncope or cough. This work-up has been done at [facility] and [facility] and records are unavailable to us. She states the cardiologist told her the heart muscle at the top of the heart didn't relax to let fluid in from the bottom. She is on a beta blocker and HCTZ; apparently had another medication, ? Diuretic she did not tolerated. She has intermittent dyspnea not changing. She recently saw her PCP. She has mild stable  "increased Creatinine.  She recounts a history of intermittent falls that dates back to at least 2010. She was evaluated at that time by a neurologist. MRI apparently consistent with prior CVA. She notes no recent increase in falls but yesterday \"stumbled\" while playing with her grandchildren. She denies dizziness, vertigo, presyncope, syncope. She has had others note a change in her speech, ? Slurred. No bowel or bladder sx.  INTERVAL HISTORY:  She had a cecal volvulus requiring surgery at [facility] in June; she recovered well.   Lab over a year ago was stable with mildly low C4, normal C3 and DNA all stable.  She had recent lab thru PCP that was stable.   She remains on Plaquenil without SE. Eye exam current.  Her joint pain has generally otherwise been stable. She has intermittent pain in her elbows L>R with prior L elbow arthroplasty, hands and some AM stiffness which is improved after only a short time.  She has pain and a lump R 2nd DIP consistent with Heberden's node with other non tender nodes. She has chronic L ankle pain with prior fracture and surgery then removal of hardware.  She has neck and back pain. She takes Tramadol not more than one per day.  She has continued sicca symptoms which are improved by using Biotene and artificial tears. She sees her dentist regularly.   Patient denies Raynaud's, pleuritic chest pain or mouth ulcers. No recent fever, chills or rigors.   They will go to [place] again this winter.       Review of Systems:   Review Of Systems  Skin: negative  Eyes: negative  Ears/Nose/Throat: see HPI  Respiratory: No shortness of breath, dyspnea on exertion, cough, or hemoptysis  Cardiovascular: negative  Gastrointestinal: see HPI  Genitourinary: negative  Musculoskeletal: see HPI  Neurologic: negative  Psychiatric: negative  Hematologic/Lymphatic/Immunologic: negative  Endocrine: negative          Past Medical History:   No past medical history on file.    Patient Active Problem List    " Diagnosis Date Noted     SLE (systemic lupus erythematosus) (H) 10/12/2011     Priority: Medium     Encounter for long-term current use of medication 10/12/2011     Priority: Medium     Problem list name updated by automated process. Provider to review       Sjogren's syndrome (H) 10/12/2011     Priority: Medium             Past Surgical History:   No past surgical history on file.          Social History:     Social History   Substance Use Topics     Smoking status: Former Smoker     Packs/day: 0.50     Smokeless tobacco: Never Used     Alcohol use Yes      Comment: Occasional use             Family History:     Family History   Problem Relation Age of Onset     Sjogren's Daughter             Allergies:     Allergies   Allergen Reactions     Dust Mite Extract      Sneezing, itchy nose.     Penicillins Rash     Sulfa Drugs Rash             Medications:     Current Outpatient Prescriptions   Medication Sig Dispense Refill     Acetaminophen (TYLENOL PO) Take 1,000 mg by mouth every 6 hours as needed for mild pain or fever       atenolol (TENORMIN) 25 MG tablet 2 tablets daily. One in the morning, one at bedtime.       ClonAZEPAM (KLONOPIN) 0.5 MG tablet Take 3 tabs at night as needed       cyclobenzaprine (FLEXERIL) 10 MG tablet Take 1 tablet 3 times daily as needed       hydrochlorothiazide (HYDRODIURIL) 50 MG tablet Take 25 mg by mouth daily.       hydroxychloroquine (PLAQUENIL) 200 MG tablet Take 1 tablet (200 mg) by mouth 2 times daily *PLEASE, SCHEDULE EYE EXAM* 180 tablet 3     levothyroxine (SYNTHROID, LEVOTHROID) 88 MCG tablet Take one daily       methylphenidate (CONCERTA) 54 MG CR tablet Take 1 tablet by mouth daily.       mometasone (NASONEX) 50 MCG/ACT nasal spray 1 spray in each nostril once daily       Multiple Vitamins-Minerals (CENTRUM SILVER PO) Take  by mouth.       pantoprazole (PROTONIX) 40 MG enteric coated tablet Take 1 tablet by mouth 2 times daily.       predniSONE (DELTASONE) 5 MG tablet Take  "3 tabs(15mg) daily x 5 days, if not better call, if better 2 tabs(10mg) daily x 3-5 days, then 1 tab(5 mg) daily x 3-5 days then stop. 30 tablet 0     ranitidine (ZANTAC) 150 MG tablet Take one at bedtime daily       simvastatin (ZOCOR) 20 MG tablet Take one tab daily              Physical Exam:   /65 (BP Location: Right arm, Patient Position: Sitting, Cuff Size: Adult Large)  Pulse 95  Resp 16  Ht 1.626 m (5' 4\")  Wt 96.6 kg (212 lb 14.4 oz)  SpO2 99%  BMI 36.54 kg/m2  Constitutional: WD-WN-WG cooperative   Eyes: nl EOM, conjunctiva, sclera   ENT: nl external ears, nose, throat   No mucous membrane lesions, diminished saliva pool   Neck: no mass or thyroid enlargement   GI: no ABD mass or tenderness, no HSM   Lymph: no cervical, supraclavicular, inguinal or epitrochlear nodes   MS: All TMJ, neck, shoulder, elbow, wrist, MCP/PIP/DIP, spine, hip, knee, ankle, and foot MTP/IP joints were examined and found without definite active synovitis. She has a mildly tender Heberden's node R 2nd finger with other developed non tender Heberden and Giselle nodes. No instability L thumb IP. 1+S and pain with finger curl R 2nd and 3rd fingers. Lacks 10+ degrees extension L elbow. Bilat TKR scars. No dactylitis, tenosynovitis, enthesopathy   Skin: no nail pitting, alopecia, rash, nodules or lesions   Neuro: nl cranial nerves, strength   Psych: nl affect.           Data:     Lab Results   Component Value Date    WBC 7.3 12/22/2016    WBC 5.3 12/23/2015    WBC 5.2 12/22/2014    HGB 13.6 12/22/2016    HGB 13.2 12/23/2015    HGB 13.8 12/22/2014    HCT 42.1 12/22/2016    HCT 41.0 12/23/2015    HCT 42.6 12/22/2014    MCV 95 12/22/2016    MCV 95 12/23/2015    MCV 96 12/22/2014     12/22/2016     12/23/2015     12/22/2014     Lab Results   Component Value Date    BUN 14 06/13/2012    BUN 22 05/16/2012    BUN 21 06/16/2010     No components found for: SEDRATE  Lab Results   Component Value Date    TSH 1.85 " 06/16/2010     Lab Results   Component Value Date    AST 25 12/22/2016    AST 22 12/23/2015    AST 23 12/22/2014    ALT 27 12/22/2016    ALT 24 12/23/2015    ALT 30 12/22/2014    ALKPHOS 104 05/16/2012     Reviewed Rheumatology lab flowsheet    Moi Jaramillo

## 2018-10-04 NOTE — LETTER
10/4/2018       RE: Shu Duckworth  [address]  [facility] [address]     Dear Colleague,    Thank you for referring your patient, Shu Duckworth, to the [facility] RHEUMATOLOGY at [facility]. Please see a copy of my visit note below.    Rheumatology Visit     Shu Duckworth MRN# [id number]   YOB: 1945 Age: 73 year old     Date of Visit: October 4, 2018  Primary care provider: Johnson Arnold MD          Assessment and Plan:   74 y/o female with +KATIE, +Ds DNA, hypocomplementemia, malar rash, alopecia and sicca symptoms, arthritis and mild renal insufficiency.   She has SLE with secondary sjogren's syndrome (+siccs sxs, +schirmer test, - minor salivary gland biopsy).   Her symptoms are controlled reasonably on Plaquenil. She did have exacerbation of symptoms off the medication due to insurance issues, and is better back on it.  Her sicca sx are stable with biotene and artificial tears. Eye and dental exams are current.   2. Recurrent dyspnea, evaluated by outside Cardiologist and Pulmonologist in past. By her description sounds as though she may have diastolic dysfunction. This is stable at this time.   3. Recurrent falls with hx of remote CVA, and ? Changes in speech. Exam is non focal. I do not think this is SLE related as before  4. Primary OA of hands, with known DJD elsewhere with persistent symptoms  5. Mild CKD, stable creatinines  6. S/P surgery for cecal volvulus  Plan: Discussed in detail with the patient   1. Continue plaquenil 200 mg po BID. Discussed opthalmology exam once a year while on plaquenil.     2. Labs current per Care Everywhere. We will do complements and DNA today and write  3. Follow up with PCP as needed  4. Continue agents for sicca sx.   5. Return in 6 months   6. She has had the flu shot this fall; she wonders about Shingles shot; she had by records Zostrix in 2012. Recommended she discuss with PCP next visit and consider  Shingrix as booster/more effective vaccine; it is in shortage right now  7. We discussed symptomatic treatment for DJD, avoidance of NSAIDs  Moi Jaramillo MD           History of Present Illness:   72 y/o female who returns for follow-up of SLE and secondary Sjogrens (diagnosed in 2010). She was last seen by me in May 2018.    HISTORY CARRIED FORWARD:   To review, she initially presented with nail dystrophy to her dermatologist who thought she may have a lichen planopilaris of the nails but given her + KATIE at 1/640 speckeled pattern and + FH of Sjogrens advised her to obtain a rheumatology evaluation. Her daughter has Primary seropositive Sjogrens and also now has autoimmune pancreatitis. At her initial rheumatology evaluation in June 2010, she had significant sicca symptoms (subjective and objective), a faint malar rash, along with a + FH of Sjogrens overall features suggestive of Sjogren's. She was refered to ophthalmology for a schirmer test and a MSG biopsy. Schirmer was abnormal; biopsy showed chronic changes but did not meet criteria for Sjogrens. Labs notable for positive KATIE (1.2), positive dsDNA (35), low C4 (9), negative SAMANTHA, RF, cryoglobulins. She was started on Plaquenil 200 mg BID in July 2010.   Previously her primary physician evaluated her for new symptoms of shortness of breath with activities. She was noticing that when she climbed the stairs or did routine household chores, she was becoming more short of breath than usual. She denies any chest pain, orthopnea, PND, syncope or cough. This work-up has been done at [facility] and [facility] and records are unavailable to us. She states the cardiologist told her the heart muscle at the top of the heart didn't relax to let fluid in from the bottom. She is on a beta blocker and HCTZ; apparently had another medication, ? Diuretic she did not tolerated. She has intermittent dyspnea not changing. She recently saw her PCP. She has mild stable  "increased Creatinine.  She recounts a history of intermittent falls that dates back to at least 2010. She was evaluated at that time by a neurologist. MRI apparently consistent with prior CVA. She notes no recent increase in falls but yesterday \"stumbled\" while playing with her grandchildren. She denies dizziness, vertigo, presyncope, syncope. She has had others note a change in her speech, ? Slurred. No bowel or bladder sx.  INTERVAL HISTORY:  She had a cecal volvulus requiring surgery at [facility] in June; she recovered well.   Lab over a year ago was stable with mildly low C4, normal C3 and DNA all stable.  She had recent lab thru PCP that was stable.   She remains on Plaquenil without SE. Eye exam current.  Her joint pain has generally otherwise been stable. She has intermittent pain in her elbows L>R with prior L elbow arthroplasty, hands and some AM stiffness which is improved after only a short time.  She has pain and a lump R 2nd DIP consistent with Heberden's node with other non tender nodes. She has chronic L ankle pain with prior fracture and surgery then removal of hardware.  She has neck and back pain. She takes Tramadol not more than one per day.  She has continued sicca symptoms which are improved by using Biotene and artificial tears. She sees her dentist regularly.   Patient denies Raynaud's, pleuritic chest pain or mouth ulcers. No recent fever, chills or rigors.   They will go to [place] again this winter.       Review of Systems:   Review Of Systems  Skin: negative  Eyes: negative  Ears/Nose/Throat: see HPI  Respiratory: No shortness of breath, dyspnea on exertion, cough, or hemoptysis  Cardiovascular: negative  Gastrointestinal: see HPI  Genitourinary: negative  Musculoskeletal: see HPI  Neurologic: negative  Psychiatric: negative  Hematologic/Lymphatic/Immunologic: negative  Endocrine: negative          Past Medical History:   No past medical history on file.    Patient Active Problem List    " Diagnosis Date Noted     SLE (systemic lupus erythematosus) (H) 10/12/2011     Priority: Medium     Encounter for long-term current use of medication 10/12/2011     Priority: Medium     Problem list name updated by automated process. Provider to review       Sjogren's syndrome (H) 10/12/2011     Priority: Medium             Past Surgical History:   No past surgical history on file.          Social History:     Social History   Substance Use Topics     Smoking status: Former Smoker     Packs/day: 0.50     Smokeless tobacco: Never Used     Alcohol use Yes      Comment: Occasional use             Family History:     Family History   Problem Relation Age of Onset     Sjogren's Daughter             Allergies:     Allergies   Allergen Reactions     Dust Mite Extract      Sneezing, itchy nose.     Penicillins Rash     Sulfa Drugs Rash             Medications:     Current Outpatient Prescriptions   Medication Sig Dispense Refill     Acetaminophen (TYLENOL PO) Take 1,000 mg by mouth every 6 hours as needed for mild pain or fever       atenolol (TENORMIN) 25 MG tablet 2 tablets daily. One in the morning, one at bedtime.       ClonAZEPAM (KLONOPIN) 0.5 MG tablet Take 3 tabs at night as needed       cyclobenzaprine (FLEXERIL) 10 MG tablet Take 1 tablet 3 times daily as needed       hydrochlorothiazide (HYDRODIURIL) 50 MG tablet Take 25 mg by mouth daily.       hydroxychloroquine (PLAQUENIL) 200 MG tablet Take 1 tablet (200 mg) by mouth 2 times daily *PLEASE, SCHEDULE EYE EXAM* 180 tablet 3     levothyroxine (SYNTHROID, LEVOTHROID) 88 MCG tablet Take one daily       methylphenidate (CONCERTA) 54 MG CR tablet Take 1 tablet by mouth daily.       mometasone (NASONEX) 50 MCG/ACT nasal spray 1 spray in each nostril once daily       Multiple Vitamins-Minerals (CENTRUM SILVER PO) Take  by mouth.       pantoprazole (PROTONIX) 40 MG enteric coated tablet Take 1 tablet by mouth 2 times daily.       predniSONE (DELTASONE) 5 MG tablet Take  "3 tabs(15mg) daily x 5 days, if not better call, if better 2 tabs(10mg) daily x 3-5 days, then 1 tab(5 mg) daily x 3-5 days then stop. 30 tablet 0     ranitidine (ZANTAC) 150 MG tablet Take one at bedtime daily       simvastatin (ZOCOR) 20 MG tablet Take one tab daily              Physical Exam:   /65 (BP Location: Right arm, Patient Position: Sitting, Cuff Size: Adult Large)  Pulse 95  Resp 16  Ht 1.626 m (5' 4\")  Wt 96.6 kg (212 lb 14.4 oz)  SpO2 99%  BMI 36.54 kg/m2  Constitutional: WD-WN-WG cooperative   Eyes: nl EOM, conjunctiva, sclera   ENT: nl external ears, nose, throat   No mucous membrane lesions, diminished saliva pool   Neck: no mass or thyroid enlargement   GI: no ABD mass or tenderness, no HSM   Lymph: no cervical, supraclavicular, inguinal or epitrochlear nodes   MS: All TMJ, neck, shoulder, elbow, wrist, MCP/PIP/DIP, spine, hip, knee, ankle, and foot MTP/IP joints were examined and found without definite active synovitis. She has a mildly tender Heberden's node R 2nd finger with other developed non tender Heberden and Giselle nodes. No instability L thumb IP. 1+S and pain with finger curl R 2nd and 3rd fingers. Lacks 10+ degrees extension L elbow. Bilat TKR scars. No dactylitis, tenosynovitis, enthesopathy   Skin: no nail pitting, alopecia, rash, nodules or lesions   Neuro: nl cranial nerves, strength   Psych: nl affect.           Data:     Lab Results   Component Value Date    WBC 7.3 12/22/2016    WBC 5.3 12/23/2015    WBC 5.2 12/22/2014    HGB 13.6 12/22/2016    HGB 13.2 12/23/2015    HGB 13.8 12/22/2014    HCT 42.1 12/22/2016    HCT 41.0 12/23/2015    HCT 42.6 12/22/2014    MCV 95 12/22/2016    MCV 95 12/23/2015    MCV 96 12/22/2014     12/22/2016     12/23/2015     12/22/2014     Lab Results   Component Value Date    BUN 14 06/13/2012    BUN 22 05/16/2012    BUN 21 06/16/2010     No components found for: SEDRATE  Lab Results   Component Value Date    TSH 1.85 " 06/16/2010     Lab Results   Component Value Date    AST 25 12/22/2016    AST 22 12/23/2015    AST 23 12/22/2014    ALT 27 12/22/2016    ALT 24 12/23/2015    ALT 30 12/22/2014    ALKPHOS 104 05/16/2012     Reviewed Rheumatology lab flowsheet    Moi Jaramillo

## 2018-10-04 NOTE — MR AVS SNAPSHOT
After Visit Summary   10/4/2018    Shu Duckworth    MRN: [id number]           Patient Information     Date Of Birth          1945        Visit Information        Provider Department      10/4/2018 4:00 PM Moi Jaramillo MD [facility] Rheumatology        Today's Diagnoses     Encounter for long-term current use of medication    -  1    Other forms of systemic lupus erythematosus, unspecified organ involvement status (H)           Follow-ups after your visit        Follow-up notes from your care team     Return in about 6 months (around 4/4/2019).      Your next 10 appointments already scheduled     Oct 04, 2018  4:30 PM CDT   Lab with  LAB   [facility] Lab ([facility])    [address]  1st Floor  [facility] [id number] [phone]            Jun 05, 2019 10:30 AM CDT   (Arrive by 10:15 AM)   RETURN LUPUS with Moi Jaramillo MD   [facility] Rheumatology ([facility])    [address]  [address]  [facility] [address] [phone]              Future tests that were ordered for you today     Open Future Orders        Priority Expected Expires Ordered    Complement C3 Routine  4/5/2019 10/4/2018    Complement C4 Routine  4/5/2019 10/4/2018    DNA double stranded antibodies Routine  4/5/2019 10/4/2018            Who to contact     If you have questions or need follow up information about today's clinic visit or your schedule please contact [facility] RHEUMATOLOGY directly at [phone].  Normal or non-critical lab and imaging results will be communicated to you by MyChart, letter or phone within 4 business days after the clinic has received the results. If you do not hear from us within 7 days, please contact the clinic through MyChart or phone. If you have a critical or abnormal lab result, we will notify you by phone as soon as possible.  Submit refill requests through [employer] or call your pharmacy and they will forward  "the refill request to us. Please allow 3 business days for your refill to be completed.          Additional Information About Your Visit        [employer] Information     [facility] gives you secure access to your electronic health record. If you see a primary care provider, you can also send messages to your care team and make appointments. If you have questions, please call your primary care clinic.  If you do not have a primary care provider, please call [phone] and they will assist you.        Care EveryWhere ID     This is your Care EveryWhere ID. This could be used by other organizations to access your [facility] medical records  [id number]        Your Vitals Were     Pulse Respirations Height Pulse Oximetry BMI (Body Mass Index)       95 16 1.626 m (5' 4\") 99% 36.54 kg/m2        Blood Pressure from Last 3 Encounters:   10/04/18 111/65   05/25/18 124/79   12/22/16 130/81    Weight from Last 3 Encounters:   10/04/18 96.6 kg (212 lb 14.4 oz)   12/22/16 93.9 kg (207 lb)   12/23/15 93.9 kg (207 lb)                 Today's Medication Changes          These changes are accurate as of 10/4/18  4:07 PM.  If you have any questions, ask your nurse or doctor.               These medicines have changed or have updated prescriptions.        Dose/Directions    levothyroxine 100 MCG tablet   Commonly known as:  SYNTHROID/LEVOTHROID   This may have changed:  Another medication with the same name was removed. Continue taking this medication, and follow the directions you see here.   Used for:  Encounter for long-term current use of medication, Other forms of systemic lupus erythematosus, unspecified organ involvement status (H)   Changed by:  Moi Jaramillo MD        Dose:  100 mcg   Take 100 mcg by mouth daily   Refills:  3               Information about OPIOIDS     PRESCRIPTION OPIOIDS: WHAT YOU NEED TO KNOW   We gave you an opioid (narcotic) pain medicine. It is important to manage your pain, but opioids are not always " the best choice. You should first try all the other options your care team gave you. Take this medicine for as short a time (and as few doses) as possible.    Some activities can increase your pain, such as bandage changes or therapy sessions. It may help to take your pain medicine 30 to 60 minutes before these activities. Reduce your stress by getting enough sleep, working on hobbies you enjoy and practicing relaxation or meditation. Talk to your care team about ways to manage your pain beyond prescription opioids.    These medicines have risks:    DO NOT drive when on new or higher doses of pain medicine. These medicines can affect your alertness and reaction times, and you could be arrested for driving under the influence (DUI). If you need to use opioids long-term, talk to your care team about driving.    DO NOT operate heavy machinery    DO NOT do any other dangerous activities while taking these medicines.    DO NOT drink any alcohol while taking these medicines.     If the opioid prescribed includes acetaminophen, DO NOT take with any other medicines that contain acetaminophen. Read all labels carefully. Look for the word  acetaminophen  or  Tylenol.  Ask your pharmacist if you have questions or are unsure.    You can get addicted to pain medicines, especially if you have a history of addiction (chemical, alcohol or substance dependence). Talk to your care team about ways to reduce this risk.    All opioids tend to cause constipation. Drink plenty of water and eat foods that have a lot of fiber, such as fruits, vegetables, prune juice, apple juice and high-fiber cereal. Take a laxative (Miralax, milk of magnesia, Colace, Senna) if you don t move your bowels at least every other day. Other side effects include upset stomach, sleepiness, dizziness, throwing up, tolerance (needing more of the medicine to have the same effect), physical dependence and slowed breathing.    Store your pills in a secure place,  locked if possible. We will not replace any lost or stolen medicine. If you don t finish your medicine, please throw away (dispose) as directed by your pharmacist. The [place] Pollution Control Agency has more information about safe disposal: https://www.pca.[facility].[place].us/living-green/managing-unwanted-medications         Primary Care Provider Office Phone # Fax #    Johnson Arnold MD, [phone]       [address] [address]        Equal Access to Services     [facility] : Hadii aad ku hadasho Soomaali, waaxda luqadaha, qaybta kaalmada adeegyada, waxay idiin hayaan samson thompson . So [facility] [phone].    ATENCIÓN: Si habla español, tiene a salgado disposición servicios gratuitos de asistencia lingüística. Marilee al [phone].    We comply with applicable federal civil rights laws and [place] laws. We do not discriminate on the basis of race, color, national origin, age, disability, sex, sexual orientation, or gender identity.            Thank you!     Thank you for choosing [facility] RHEUMATOLOGY  for your care. Our goal is always to provide you with excellent care. Hearing back from our patients is one way we can continue to improve our services. Please take a few minutes to complete the written survey that you may receive in the mail after your visit with us. Thank you!             Your Updated Medication List - Protect others around you: Learn how to safely use, store and throw away your medicines at www.disposemymeds.org.          This list is accurate as of 10/4/18  4:07 PM.  Always use your most recent med list.                   Brand Name Dispense Instructions for use Diagnosis    amphetamine-dextroamphetamine 20 MG per tablet    ADDERALL     Take 10 mg by mouth 2 times daily    Encounter for long-term current use of medication, Other forms of systemic lupus erythematosus, unspecified organ involvement status (H)       atenolol 25 MG  tablet    TENORMIN     2 tablets daily. One in the morning, one at bedtime.    SLE (systemic lupus erythematosus) (H), Encounter for long-term (current) use of other medications       CENTRUM SILVER PO      Take  by mouth.        clonazePAM 0.5 MG tablet    klonoPIN     Take 1 tab during day and 2 at bedtime as needed (max of 3 tabs per 24 hours)    SLE (systemic lupus erythematosus) (H), Encounter for long-term (current) use of other medications       FLEXERIL 10 MG tablet   Generic drug:  cyclobenzaprine      Take 1 tablet 3 times daily as needed    SLE (systemic lupus erythematosus) (H), Encounter for long-term (current) use of other medications       hydrochlorothiazide 50 MG tablet    HYDRODIURIL     Take 25 mg by mouth daily.    SLE (systemic lupus erythematosus) (H), Encounter for long-term (current) use of other medications       hydroxychloroquine 200 MG tablet    PLAQUENIL    180 tablet    Take 1 tablet (200 mg) by mouth 2 times daily *PLEASE, SCHEDULE EYE EXAM*    Systemic lupus erythematosus, unspecified SLE type, unspecified organ involvement status (H)       levothyroxine 100 MCG tablet    SYNTHROID/LEVOTHROID     Take 100 mcg by mouth daily    Encounter for long-term current use of medication, Other forms of systemic lupus erythematosus, unspecified organ involvement status (H)       metoprolol succinate 50 MG 24 hr tablet    TOPROL-XL     Take 50 mg by mouth daily    Encounter for long-term current use of medication, Other forms of systemic lupus erythematosus, unspecified organ involvement status (H)       NASONEX 50 MCG/ACT spray   Generic drug:  mometasone      1 spray in each nostril once daily    SLE (systemic lupus erythematosus) (H), Encounter for long-term (current) use of other medications       PROTONIX 40 MG EC tablet   Generic drug:  pantoprazole      Take 1 tablet by mouth 2 times daily.    SLE (systemic lupus erythematosus) (H), Encounter for long-term (current) use of other medications        rOPINIRole 1 MG tablet    REQUIP     Take 1 mg in afternoon and 5 mg at bedtime    Encounter for long-term current use of medication, Other forms of systemic lupus erythematosus, unspecified organ involvement status (H)       sertraline 100 MG tablet    ZOLOFT     Take 150 mg by mouth every morning    Encounter for long-term current use of medication, Other forms of systemic lupus erythematosus, unspecified organ involvement status (H)       simvastatin 20 MG tablet    ZOCOR     Take one tab daily    SLE (systemic lupus erythematosus) (H), Encounter for long-term (current) use of other medications       traMADol 50 MG tablet   Start taking on:  10/30/2018    ULTRAM     Take 50 mg by mouth every 6 hours as needed for pain    Encounter for long-term current use of medication, Other forms of systemic lupus erythematosus, unspecified organ involvement status (H)       TYLENOL PO      Take 1,000 mg by mouth every 6 hours as needed for mild pain or fever        vitamin D3 1000 units Caps      Take 1,000 Units by mouth daily    Encounter for long-term current use of medication, Other forms of systemic lupus erythematosus, unspecified organ involvement status (H)       ZANTAC 150 MG tablet   Generic drug:  ranitidine      Take one at bedtime daily    SLE (systemic lupus erythematosus) (H), Encounter for long-term (current) use of other medications

## 2018-10-04 NOTE — PROGRESS NOTES
Rheumatology Visit     Shu Duckworth MRN# [id number]   YOB: 1945 Age: 73 year old     Date of Visit: October 4, 2018  Primary care provider: Johnson Arnold MD          Assessment and Plan:   72 y/o female with +KATIE, +Ds DNA, hypocomplementemia, malar rash, alopecia and sicca symptoms, arthritis and mild renal insufficiency.   She has SLE with secondary sjogren's syndrome (+siccs sxs, +schirmer test, - minor salivary gland biopsy).   Her symptoms are controlled reasonably on Plaquenil. She did have exacerbation of symptoms off the medication due to insurance issues, and is better back on it.  Her sicca sx are stable with biotene and artificial tears. Eye and dental exams are current.   2. Recurrent dyspnea, evaluated by outside Cardiologist and Pulmonologist in past. By her description sounds as though she may have diastolic dysfunction. This is stable at this time.   3. Recurrent falls with hx of remote CVA, and ? Changes in speech. Exam is non focal. I do not think this is SLE related as before  4. Primary OA of hands, with known DJD elsewhere with persistent symptoms  5. Mild CKD, stable creatinines  6. S/P surgery for cecal volvulus  Plan: Discussed in detail with the patient   1. Continue plaquenil 200 mg po BID. Discussed opthalmology exam once a year while on plaquenil.     2. Labs current per Care Everywhere. We will do complements and DNA today and write  3. Follow up with PCP as needed  4. Continue agents for sicca sx.   5. Return in 6 months   6. She has had the flu shot this fall; she wonders about Shingles shot; she had by records Zostrix in 2012. Recommended she discuss with PCP next visit and consider Shingrix as booster/more effective vaccine; it is in shortage right now  7. We discussed symptomatic treatment for DJD, avoidance of NSAIDs  Moi Jaramillo MD           History of Present Illness:   72 y/o female who returns for follow-up of SLE and secondary Sjogrens (diagnosed in  "2010). She was last seen by me in May 2018.    HISTORY CARRIED FORWARD:   To review, she initially presented with nail dystrophy to her dermatologist who thought she may have a lichen planopilaris of the nails but given her + KATIE at 1/640 speckeled pattern and + FH of Sjogrens advised her to obtain a rheumatology evaluation. Her daughter has Primary seropositive Sjogrens and also now has autoimmune pancreatitis. At her initial rheumatology evaluation in June 2010, she had significant sicca symptoms (subjective and objective), a faint malar rash, along with a + FH of Sjogrens overall features suggestive of Sjogren's. She was refered to ophthalmology for a schirmer test and a MSG biopsy. Schirmer was abnormal; biopsy showed chronic changes but did not meet criteria for Sjogrens. Labs notable for positive KATIE (1.2), positive dsDNA (35), low C4 (9), negative SAMANTHA, RF, cryoglobulins. She was started on Plaquenil 200 mg BID in July 2010.   Previously her primary physician evaluated her for new symptoms of shortness of breath with activities. She was noticing that when she climbed the stairs or did routine household chores, she was becoming more short of breath than usual. She denies any chest pain, orthopnea, PND, syncope or cough. This work-up has been done at [facility] and [facility] and records are unavailable to us. She states the cardiologist told her the heart muscle at the top of the heart didn't relax to let fluid in from the bottom. She is on a beta blocker and HCTZ; apparently had another medication, ? Diuretic she did not tolerated. She has intermittent dyspnea not changing. She recently saw her PCP. She has mild stable increased Creatinine.  She recounts a history of intermittent falls that dates back to at least 2010. She was evaluated at that time by a neurologist. MRI apparently consistent with prior CVA. She notes no recent increase in falls but yesterday \"stumbled\" while playing with her " grandchildren. She denies dizziness, vertigo, presyncope, syncope. She has had others note a change in her speech, ? Slurred. No bowel or bladder sx.  INTERVAL HISTORY:  She had a cecal volvulus requiring surgery at [facility] in June; she recovered well.   Lab over a year ago was stable with mildly low C4, normal C3 and DNA all stable.  She had recent lab thru PCP that was stable.   She remains on Plaquenil without SE. Eye exam current.  Her joint pain has generally otherwise been stable. She has intermittent pain in her elbows L>R with prior L elbow arthroplasty, hands and some AM stiffness which is improved after only a short time.  She has pain and a lump R 2nd DIP consistent with Heberden's node with other non tender nodes. She has chronic L ankle pain with prior fracture and surgery then removal of hardware.  She has neck and back pain. She takes Tramadol not more than one per day.  She has continued sicca symptoms which are improved by using Biotene and artificial tears. She sees her dentist regularly.   Patient denies Raynaud's, pleuritic chest pain or mouth ulcers. No recent fever, chills or rigors.   They will go to [place] again this winter.       Review of Systems:   Review Of Systems  Skin: negative  Eyes: negative  Ears/Nose/Throat: see HPI  Respiratory: No shortness of breath, dyspnea on exertion, cough, or hemoptysis  Cardiovascular: negative  Gastrointestinal: see HPI  Genitourinary: negative  Musculoskeletal: see HPI  Neurologic: negative  Psychiatric: negative  Hematologic/Lymphatic/Immunologic: negative  Endocrine: negative          Past Medical History:   No past medical history on file.    Patient Active Problem List    Diagnosis Date Noted     SLE (systemic lupus erythematosus) (H) 10/12/2011     Priority: Medium     Encounter for long-term current use of medication 10/12/2011     Priority: Medium     Problem list name updated by automated process. Provider to review       Sjogren's syndrome (H)  10/12/2011     Priority: Medium             Past Surgical History:   No past surgical history on file.          Social History:     Social History   Substance Use Topics     Smoking status: Former Smoker     Packs/day: 0.50     Smokeless tobacco: Never Used     Alcohol use Yes      Comment: Occasional use             Family History:     Family History   Problem Relation Age of Onset     Sjogren's Daughter             Allergies:     Allergies   Allergen Reactions     Dust Mite Extract      Sneezing, itchy nose.     Penicillins Rash     Sulfa Drugs Rash             Medications:     Current Outpatient Prescriptions   Medication Sig Dispense Refill     Acetaminophen (TYLENOL PO) Take 1,000 mg by mouth every 6 hours as needed for mild pain or fever       atenolol (TENORMIN) 25 MG tablet 2 tablets daily. One in the morning, one at bedtime.       ClonAZEPAM (KLONOPIN) 0.5 MG tablet Take 3 tabs at night as needed       cyclobenzaprine (FLEXERIL) 10 MG tablet Take 1 tablet 3 times daily as needed       hydrochlorothiazide (HYDRODIURIL) 50 MG tablet Take 25 mg by mouth daily.       hydroxychloroquine (PLAQUENIL) 200 MG tablet Take 1 tablet (200 mg) by mouth 2 times daily *PLEASE, SCHEDULE EYE EXAM* 180 tablet 3     levothyroxine (SYNTHROID, LEVOTHROID) 88 MCG tablet Take one daily       methylphenidate (CONCERTA) 54 MG CR tablet Take 1 tablet by mouth daily.       mometasone (NASONEX) 50 MCG/ACT nasal spray 1 spray in each nostril once daily       Multiple Vitamins-Minerals (CENTRUM SILVER PO) Take  by mouth.       pantoprazole (PROTONIX) 40 MG enteric coated tablet Take 1 tablet by mouth 2 times daily.       predniSONE (DELTASONE) 5 MG tablet Take 3 tabs(15mg) daily x 5 days, if not better call, if better 2 tabs(10mg) daily x 3-5 days, then 1 tab(5 mg) daily x 3-5 days then stop. 30 tablet 0     ranitidine (ZANTAC) 150 MG tablet Take one at bedtime daily       simvastatin (ZOCOR) 20 MG tablet Take one tab daily               "Physical Exam:   /65 (BP Location: Right arm, Patient Position: Sitting, Cuff Size: Adult Large)  Pulse 95  Resp 16  Ht 1.626 m (5' 4\")  Wt 96.6 kg (212 lb 14.4 oz)  SpO2 99%  BMI 36.54 kg/m2  Constitutional: WD-WN-WG cooperative   Eyes: nl EOM, conjunctiva, sclera   ENT: nl external ears, nose, throat   No mucous membrane lesions, diminished saliva pool   Neck: no mass or thyroid enlargement   GI: no ABD mass or tenderness, no HSM   Lymph: no cervical, supraclavicular, inguinal or epitrochlear nodes   MS: All TMJ, neck, shoulder, elbow, wrist, MCP/PIP/DIP, spine, hip, knee, ankle, and foot MTP/IP joints were examined and found without definite active synovitis. She has a mildly tender Heberden's node R 2nd finger with other developed non tender Heberden and Giselle nodes. No instability L thumb IP. 1+S and pain with finger curl R 2nd and 3rd fingers. Lacks 10+ degrees extension L elbow. Bilat TKR scars. No dactylitis, tenosynovitis, enthesopathy   Skin: no nail pitting, alopecia, rash, nodules or lesions   Neuro: nl cranial nerves, strength   Psych: nl affect.           Data:     Lab Results   Component Value Date    WBC 7.3 12/22/2016    WBC 5.3 12/23/2015    WBC 5.2 12/22/2014    HGB 13.6 12/22/2016    HGB 13.2 12/23/2015    HGB 13.8 12/22/2014    HCT 42.1 12/22/2016    HCT 41.0 12/23/2015    HCT 42.6 12/22/2014    MCV 95 12/22/2016    MCV 95 12/23/2015    MCV 96 12/22/2014     12/22/2016     12/23/2015     12/22/2014     Lab Results   Component Value Date    BUN 14 06/13/2012    BUN 22 05/16/2012    BUN 21 06/16/2010     No components found for: SEDRATE  Lab Results   Component Value Date    TSH 1.85 06/16/2010     Lab Results   Component Value Date    AST 25 12/22/2016    AST 22 12/23/2015    AST 23 12/22/2014    ALT 27 12/22/2016    ALT 24 12/23/2015    ALT 30 12/22/2014    ALKPHOS 104 05/16/2012     Reviewed Rheumatology lab flowsheet    Moi Jaramillo    "

## 2018-10-04 NOTE — NURSING NOTE
"Chief Complaint   Patient presents with     RECHECK     SLE       /65 (BP Location: Right arm, Patient Position: Sitting, Cuff Size: Adult Large)  Pulse 95  Resp 16  Ht 1.626 m (5' 4\")  Wt 96.6 kg (212 lb 14.4 oz)  SpO2 99%  BMI 36.54 kg/m2      Sandy Disla [facility]  10/4/2018 3:47 PM      "

## 2018-10-05 LAB
C3 SERPL-MCNC: 97 MG/DL (ref 76–169)
C4 SERPL-MCNC: 8 MG/DL (ref 15–50)

## 2018-10-06 LAB — DSDNA AB SER-ACNC: 1 IU/ML

## 2019-01-04 ENCOUNTER — TELEPHONE (OUTPATIENT)
Dept: RHEUMATOLOGY | Facility: CLINIC | Age: 74
End: 2019-01-04

## 2019-01-04 NOTE — TELEPHONE ENCOUNTER
[facility] Call Center    Phone Message    May a detailed message be left on voicemail: yes    Reason for Call: Other: Patient needs a Prior Auth for RX Plaquenil for her new insurance.  Please call patient to discuss.      Action Taken: Message routed to:  [facility] reza

## 2019-01-07 NOTE — TELEPHONE ENCOUNTER
Prior Authorization Not Needed per Insurance    Medication: Plaquenil-PA NOT NEEDED   Insurance Company: [employer] - Phone [phone] Fax [phone]  Expected CoPay: $143.80    Pharmacy Filling the Rx: [id number] - [place], [place] - [address] AT [facility]  Pharmacy Notified: Yes  Patient Notified: No    Called pharmacy and pharmacy stated that PA is Not Needed and medication is covered. Pharmacy will notify patient on medication. Insurance also states that PA is Not Needed and medication is covered.

## 2019-01-07 NOTE — TELEPHONE ENCOUNTER
Central Prior Authorization Team   [phone]    PA Initiation    Medication: Plaquenil  Insurance Company: [employer] - Phone [phone] Fax [phone]  Pharmacy Filling the Rx: [id number] - [place], [place] - [facility] [address] [facility] [address]  Filling Pharmacy Phone: [phone]  Filling Pharmacy Fax:    Start Date: 1/7/2019

## 2019-05-25 DIAGNOSIS — M32.9 SYSTEMIC LUPUS ERYTHEMATOSUS, UNSPECIFIED SLE TYPE, UNSPECIFIED ORGAN INVOLVEMENT STATUS (H): ICD-10-CM

## 2019-05-26 RX ORDER — HYDROXYCHLOROQUINE SULFATE 200 MG/1
200 TABLET, FILM COATED ORAL 2 TIMES DAILY
Qty: 180 TABLET | Refills: 0 | Status: SHIPPED | OUTPATIENT
Start: 2019-05-26 | End: 2019-08-08

## 2019-06-28 ENCOUNTER — TRANSFERRED RECORDS (OUTPATIENT)
Dept: HEALTH INFORMATION MANAGEMENT | Facility: CLINIC | Age: 74
End: 2019-06-28

## 2019-08-01 ENCOUNTER — MYC MEDICAL ADVICE (OUTPATIENT)
Dept: RHEUMATOLOGY | Facility: CLINIC | Age: 74
End: 2019-08-01

## 2019-08-01 ENCOUNTER — TELEPHONE (OUTPATIENT)
Dept: RHEUMATOLOGY | Facility: CLINIC | Age: 74
End: 2019-08-01

## 2019-08-01 NOTE — TELEPHONE ENCOUNTER
Left message for Shu to return my call in regards to offering a sooner appointment time other than the 8/15/19.    Will also send her a [employer] message.

## 2019-08-02 NOTE — TELEPHONE ENCOUNTER
Left message for Shu to return my call in regards to trying to change her appointment to a different day; trying to clear 8/14th as per Dr. Martinez request.     Trina Bridges MSN, RN  Rheumatology RN Care Coordinator  [facility]

## 2019-08-05 NOTE — TELEPHONE ENCOUNTER
Spoke to Shu to reschedule appointment with Dr. Jaramillo for 8/8 at 430pm.    Trina Bridges MSN, RN  Rheumatology RN Care Coordinator   [facility]

## 2019-08-05 NOTE — TELEPHONE ENCOUNTER
[facility] Call Center    Phone Message    May a detailed message be left on voicemail: yes    Reason for Call: Other: Pt called in returning a call to Trina. PLease follow up when available. Thank you     Action Taken: Message routed to:  [facility] Rheum

## 2019-08-08 ENCOUNTER — OFFICE VISIT (OUTPATIENT)
Dept: RHEUMATOLOGY | Facility: CLINIC | Age: 74
End: 2019-08-08
Attending: INTERNAL MEDICINE
Payer: COMMERCIAL

## 2019-08-08 VITALS
DIASTOLIC BLOOD PRESSURE: 74 MMHG | WEIGHT: 211.7 LBS | BODY MASS INDEX: 36.34 KG/M2 | HEART RATE: 72 BPM | SYSTOLIC BLOOD PRESSURE: 117 MMHG | OXYGEN SATURATION: 95 %

## 2019-08-08 DIAGNOSIS — M32.9 SYSTEMIC LUPUS ERYTHEMATOSUS, UNSPECIFIED SLE TYPE, UNSPECIFIED ORGAN INVOLVEMENT STATUS (H): ICD-10-CM

## 2019-08-08 PROCEDURE — G0463 HOSPITAL OUTPT CLINIC VISIT: HCPCS | Mod: ZF

## 2019-08-08 RX ORDER — HYDROXYCHLOROQUINE SULFATE 200 MG/1
200 TABLET, FILM COATED ORAL 2 TIMES DAILY
Qty: 180 TABLET | Refills: 3 | Status: SHIPPED | OUTPATIENT
Start: 2019-08-08 | End: 2020-09-10

## 2019-08-08 ASSESSMENT — PAIN SCALES - GENERAL: PAINLEVEL: NO PAIN (0)

## 2019-08-08 NOTE — LETTER
8/8/2019      RE: Shu Duckworth  [address] [place] [address]       Rheumatology Visit     Shu Duckworth MRN# [id number]   YOB: 1945 Age: 74 year old     Date of Visit: August 8, 2019  Primary care provider: Johnson Arnold MD          Assessment and Plan:   75 y/o female with +KATIE, +Ds DNA, hypocomplementemia, malar rash, alopecia and sicca symptoms, arthritis and mild renal insufficiency.     She has SLE with secondary sjogren's syndrome (+siccs sxs, +schirmer test, - minor salivary gland biopsy).     Her symptoms are controlled reasonably on Plaquenil. She did have exacerbation of symptoms off the medication due to insurance issues, and is better back on it.  Her sicca sx are stable with biotene and artificial tears. Eye and dental exams are current.   2. Recurrent dyspnea, evaluated by outside Cardiologist and Pulmonologist in past. By her description sounds as though she may have diastolic dysfunction. This is stable at this time.   3. Recurrent falls with hx of remote CVA, and ? Changes in speech. Exam is non focal. I do not think this is SLE related as before  4. Primary OA of hands, with known DJD elsewhere with persistent symptoms  5. Mild CKD, stable creatinines  6. S/P surgery for cecal volvulus    Plan: Discussed in detail with the patient     1. Continue plaquenil 200 mg po BID. Discussed opthalmology exam once a year while on plaquenil. This is current  2. Labs current per Care Everywhere.   3. Follow up with PCP as needed  4. Continue agents for sicca sx.   5. Return in 6 months with new Rheumatologist  6. Flu shot this fall  7. We discussed symptomatic treatment for DJD, avoidance of NSAIDs    Moi Jaramillo MD MACR          History of Present Illness:   74 y/o female who returns for follow-up of SLE and secondary Sjogrens (diagnosed in 2010). She was last seen by me in  October 2018.      HISTORY CARRIED FORWARD:     To review, she initially presented with nail  "dystrophy to her dermatologist who thought she may have a lichen planopilaris of the nails but given her + KATIE at 1/640 speckeled pattern and + FH of Sjogrens advised her to obtain a rheumatology evaluation. Her daughter has Primary seropositive Sjogrens and also now has autoimmune pancreatitis. At her initial rheumatology evaluation in June 2010, she had significant sicca symptoms (subjective and objective), a faint malar rash, along with a + FH of Sjogrens overall features suggestive of Sjogren's. She was refered to ophthalmology for a schirmer test and a MSG biopsy. Schirmer was abnormal; biopsy showed chronic changes but did not meet criteria for Sjogrens. Labs notable for positive KATIE (1.2), positive dsDNA (35), low C4 (9), negative SAMANTHA, RF, cryoglobulins. She was started on Plaquenil 200 mg BID in July 2010.   Previously her primary physician evaluated her for new symptoms of shortness of breath with activities. She was noticing that when she climbed the stairs or did routine household chores, she was becoming more short of breath than usual. She denies any chest pain, orthopnea, PND, syncope or cough. This work-up has been done at [facility] and [facility] and records are unavailable to us. She states the cardiologist told her the heart muscle at the top of the heart didn't relax to let fluid in from the bottom. She is on a beta blocker and HCTZ; apparently had another medication, ? Diuretic she did not tolerated. She has intermittent dyspnea not changing. She recently saw her PCP. She has mild stable increased Creatinine.  She recounts a history of intermittent falls that dates back to at least 2010. She was evaluated at that time by a neurologist. MRI apparently consistent with prior CVA. She notes no recent increase in falls but yesterday \"stumbled\" while playing with her grandchildren. She denies dizziness, vertigo, presyncope, syncope. She has had others note a change in her speech, ? " Slurred. No bowel or bladder sx.    INTERVAL HISTORY 10/18:    She had a cecal volvulus requiring surgery at [facility] in June; she recovered well.   Lab over a year ago was stable with mildly low C4, normal C3 and DNA all stable.  She had recent lab thru PCP that was stable.   She remains on Plaquenil without SE. Eye exam current.  Her joint pain has generally otherwise been stable. She has intermittent pain in her elbows L>R with prior L elbow arthroplasty, hands and some AM stiffness which is improved after only a short time.  She has pain and a lump R 2nd DIP consistent with Heberden's node with other non tender nodes. She has chronic L ankle pain with prior fracture and surgery then removal of hardware.  She has neck and back pain. She takes Tramadol not more than one per day.  She has continued sicca symptoms which are improved by using Biotene and artificial tears. She sees her dentist regularly.   Patient denies Raynaud's, pleuritic chest pain or mouth ulcers. No recent fever, chills or rigors.   They will go to [place] again this winter.    INTERVAL HISTORY 8/19:    She thinks her symptoms are stable since last seen in October. No flares.    She remains on Plaquenil 200 mg bid. By report she saw her Ophthalmologist earlier this year and exam stable on Plaquenil.    She has followup with her PCP and has had interval lab unremarkable per Care Everywhere.    She would be happy to continue on current medication.  She wondered if there was a less expensive alternative to Hydroxychloroquine and we discussed there was no close substitute.    ROS otherwise unremarkable.       Review of Systems:   Review Of Systems  Skin: negative  Eyes: negative  Ears/Nose/Throat: see HPI  Respiratory: No shortness of breath, dyspnea on exertion, cough, or hemoptysis  Cardiovascular: negative  Gastrointestinal: negative  Genitourinary: negative  Musculoskeletal:see HPI  Neurologic: negative  Psychiatric:  negative  Hematologic/Lymphatic/Immunologic: negative  Endocrine: negative          Past Medical History:   No past medical history on file.    Patient Active Problem List    Diagnosis Date Noted     Other forms of systemic lupus erythematosus, unspecified organ involvement status (H) 10/04/2018     Priority: Medium     SLE (systemic lupus erythematosus) (H) 10/12/2011     Priority: Medium     Encounter for long-term current use of medication 10/12/2011     Priority: Medium     Problem list name updated by automated process. Provider to review       Sjogren's syndrome (H) 10/12/2011     Priority: Medium             Past Surgical History:   No past surgical history on file.          Social History:     Social History     Tobacco Use     Smoking status: Former Smoker     Packs/day: 0.50     Smokeless tobacco: Never Used   Substance Use Topics     Alcohol use: Yes     Comment: Occasional use             Family History:     Family History   Problem Relation Age of Onset     Sjogren's Daughter             Allergies:     Allergies   Allergen Reactions     Dust Mite Extract      Sneezing, itchy nose.     Penicillins Rash     Sulfa Drugs Rash             Medications:     Current Outpatient Medications   Medication Sig Dispense Refill     Acetaminophen (TYLENOL PO) Take 1,000 mg by mouth every 6 hours as needed for mild pain or fever       amphetamine-dextroamphetamine (ADDERALL) 20 MG per tablet Take 10 mg by mouth 2 times daily       atenolol (TENORMIN) 25 MG tablet 2 tablets daily. One in the morning, one at bedtime.       Cholecalciferol (VITAMIN D3) 1000 units CAPS Take 1,000 Units by mouth daily       ClonAZEPAM (KLONOPIN) 0.5 MG tablet Take 1 tab during day and 2 at bedtime as needed (max of 3 tabs per 24 hours)       cyclobenzaprine (FLEXERIL) 10 MG tablet Take 1 tablet 3 times daily as needed       hydrochlorothiazide (HYDRODIURIL) 50 MG tablet Take 25 mg by mouth daily.       hydroxychloroquine (PLAQUENIL) 200 MG  tablet Take 1 tablet (200 mg) by mouth 2 times daily 180 tablet 0     levothyroxine (SYNTHROID/LEVOTHROID) 100 MCG tablet Take 100 mcg by mouth daily  3     metoprolol succinate (TOPROL-XL) 50 MG 24 hr tablet Take 50 mg by mouth daily       mometasone (NASONEX) 50 MCG/ACT nasal spray 1 spray in each nostril once daily       Multiple Vitamins-Minerals (CENTRUM SILVER PO) Take  by mouth.       pantoprazole (PROTONIX) 40 MG enteric coated tablet Take 1 tablet by mouth 2 times daily.       ranitidine (ZANTAC) 150 MG tablet Take one at bedtime daily       rOPINIRole (REQUIP) 1 MG tablet Take 1 mg in afternoon and 5 mg at bedtime       sertraline (ZOLOFT) 100 MG tablet Take 150 mg by mouth every morning       simvastatin (ZOCOR) 20 MG tablet Take one tab daily       traMADol (ULTRAM) 50 MG tablet Take 50 mg by mouth every 6 hours as needed for pain              Physical Exam:   Blood pressure 117/74, pulse 72, weight 96 kg (211 lb 11.2 oz), SpO2 95 %.  Constitutional: WD-WN-WG cooperative   Eyes: nl EOM, conjunctiva, sclera   ENT: nl external ears, nose, throat   No mucous membrane lesions, diminished saliva pool   Neck: no mass or thyroid enlargement   GI: no ABD mass or tenderness, no HSM   Lymph: no cervical, supraclavicular, inguinal or epitrochlear nodes   MS: All TMJ, neck, shoulder, elbow, wrist, MCP/PIP/DIP, spine, hip, knee, ankle, and foot MTP/IP joints were examined and found without definite active synovitis. She has a mildly tender Heberden's node R 2nd finger with other developed non tender Heberden and Giselle nodes. No instability L thumb IP. 1+S and pain with finger curl R 2nd and 3rd fingers. Lacks 10+ degrees extension L elbow. Bilat TKR scars. No dactylitis, tenosynovitis, enthesopathy   Skin: no nail pitting, alopecia, rash, nodules or lesions   Neuro: nl cranial nerves, strength   Psych: nl affect.         Data:     Lab Results   Component Value Date    WBC 7.3 12/22/2016    WBC 5.3 12/23/2015     WBC 5.2 12/22/2014    HGB 13.6 12/22/2016    HGB 13.2 12/23/2015    HGB 13.8 12/22/2014    HCT 42.1 12/22/2016    HCT 41.0 12/23/2015    HCT 42.6 12/22/2014    MCV 95 12/22/2016    MCV 95 12/23/2015    MCV 96 12/22/2014     12/22/2016     12/23/2015     12/22/2014     Lab Results   Component Value Date    BUN 14 06/13/2012    BUN 22 05/16/2012    BUN 21 06/16/2010     No components found for: SEDRATE  Lab Results   Component Value Date    TSH 1.85 06/16/2010     Lab Results   Component Value Date    AST 25 12/22/2016    AST 22 12/23/2015    AST 23 12/22/2014    ALT 27 12/22/2016    ALT 24 12/23/2015    ALT 30 12/22/2014    ALKPHOS 104 05/16/2012     Reviewed Rheumatology lab flowsheet    Moi Jaramillo

## 2019-08-08 NOTE — NURSING NOTE
"Chief Complaint   Patient presents with     RECHECK     6 month f/u lupus     Vital signs:      BP: 117/74 Pulse: 72     SpO2: 95 %       Weight: 96 kg (211 lb 11.2 oz)  Estimated body mass index is 36.34 kg/m  as calculated from the following:    Height as of 10/4/18: 1.626 m (5' 4\").    Weight as of this encounter: 96 kg (211 lb 11.2 oz).        Monica Valderrama    "

## 2019-08-08 NOTE — PROGRESS NOTES
Rheumatology Visit     Shu Duckworth MRN# [id number]   YOB: 1945 Age: 74 year old     Date of Visit: August 8, 2019  Primary care provider: Johnson Arnold MD          Assessment and Plan:   73 y/o female with +KATIE, +Ds DNA, hypocomplementemia, malar rash, alopecia and sicca symptoms, arthritis and mild renal insufficiency.     She has SLE with secondary sjogren's syndrome (+siccs sxs, +schirmer test, - minor salivary gland biopsy).     Her symptoms are controlled reasonably on Plaquenil. She did have exacerbation of symptoms off the medication due to insurance issues, and is better back on it.  Her sicca sx are stable with biotene and artificial tears. Eye and dental exams are current.   2. Recurrent dyspnea, evaluated by outside Cardiologist and Pulmonologist in past. By her description sounds as though she may have diastolic dysfunction. This is stable at this time.   3. Recurrent falls with hx of remote CVA, and ? Changes in speech. Exam is non focal. I do not think this is SLE related as before  4. Primary OA of hands, with known DJD elsewhere with persistent symptoms  5. Mild CKD, stable creatinines  6. S/P surgery for cecal volvulus    Plan: Discussed in detail with the patient     1. Continue plaquenil 200 mg po BID. Discussed opthalmology exam once a year while on plaquenil. This is current  2. Labs current per Care Everywhere.   3. Follow up with PCP as needed  4. Continue agents for sicca sx.   5. Return in 6 months with new Rheumatologist  6. Flu shot this fall  7. We discussed symptomatic treatment for DJD, avoidance of NSAIDs    Moi Jaramillo MD MACR          History of Present Illness:   72 y/o female who returns for follow-up of SLE and secondary Sjogrens (diagnosed in 2010). She was last seen by me in October 2018.      HISTORY CARRIED FORWARD:     To review, she initially presented with nail dystrophy to her dermatologist who thought she may have a lichen planopilaris of the  "nails but given her + KATIE at 1/640 speckeled pattern and + FH of Sjogrens advised her to obtain a rheumatology evaluation. Her daughter has Primary seropositive Sjogrens and also now has autoimmune pancreatitis. At her initial rheumatology evaluation in June 2010, she had significant sicca symptoms (subjective and objective), a faint malar rash, along with a + FH of Sjogrens overall features suggestive of Sjogren's. She was refered to ophthalmology for a schirmer test and a MSG biopsy. Schirmer was abnormal; biopsy showed chronic changes but did not meet criteria for Sjogrens. Labs notable for positive KATIE (1.2), positive dsDNA (35), low C4 (9), negative SAMANTHA, RF, cryoglobulins. She was started on Plaquenil 200 mg BID in July 2010.   Previously her primary physician evaluated her for new symptoms of shortness of breath with activities. She was noticing that when she climbed the stairs or did routine household chores, she was becoming more short of breath than usual. She denies any chest pain, orthopnea, PND, syncope or cough. This work-up has been done at [facility] and [facility] and records are unavailable to us. She states the cardiologist told her the heart muscle at the top of the heart didn't relax to let fluid in from the bottom. She is on a beta blocker and HCTZ; apparently had another medication, ? Diuretic she did not tolerated. She has intermittent dyspnea not changing. She recently saw her PCP. She has mild stable increased Creatinine.  She recounts a history of intermittent falls that dates back to at least 2010. She was evaluated at that time by a neurologist. MRI apparently consistent with prior CVA. She notes no recent increase in falls but yesterday \"stumbled\" while playing with her grandchildren. She denies dizziness, vertigo, presyncope, syncope. She has had others note a change in her speech, ? Slurred. No bowel or bladder sx.    INTERVAL HISTORY 10/18:    She had a cecal volvulus " requiring surgery at [facility] in June; she recovered well.   Lab over a year ago was stable with mildly low C4, normal C3 and DNA all stable.  She had recent lab thru PCP that was stable.   She remains on Plaquenil without SE. Eye exam current.  Her joint pain has generally otherwise been stable. She has intermittent pain in her elbows L>R with prior L elbow arthroplasty, hands and some AM stiffness which is improved after only a short time.  She has pain and a lump R 2nd DIP consistent with Heberden's node with other non tender nodes. She has chronic L ankle pain with prior fracture and surgery then removal of hardware.  She has neck and back pain. She takes Tramadol not more than one per day.  She has continued sicca symptoms which are improved by using Biotene and artificial tears. She sees her dentist regularly.   Patient denies Raynaud's, pleuritic chest pain or mouth ulcers. No recent fever, chills or rigors.   They will go to [place] again this winter.    INTERVAL HISTORY 8/19:    She thinks her symptoms are stable since last seen in October. No flares.    She remains on Plaquenil 200 mg bid. By report she saw her Ophthalmologist earlier this year and exam stable on Plaquenil.    She has followup with her PCP and has had interval lab unremarkable per Care Everywhere.    She would be happy to continue on current medication.  She wondered if there was a less expensive alternative to Hydroxychloroquine and we discussed there was no close substitute.    ROS otherwise unremarkable.       Review of Systems:   Review Of Systems  Skin: negative  Eyes: negative  Ears/Nose/Throat: see HPI  Respiratory: No shortness of breath, dyspnea on exertion, cough, or hemoptysis  Cardiovascular: negative  Gastrointestinal: negative  Genitourinary: negative  Musculoskeletal:see HPI  Neurologic: negative  Psychiatric: negative  Hematologic/Lymphatic/Immunologic: negative  Endocrine: negative          Past Medical History:   No past  medical history on file.    Patient Active Problem List    Diagnosis Date Noted     Other forms of systemic lupus erythematosus, unspecified organ involvement status (H) 10/04/2018     Priority: Medium     SLE (systemic lupus erythematosus) (H) 10/12/2011     Priority: Medium     Encounter for long-term current use of medication 10/12/2011     Priority: Medium     Problem list name updated by automated process. Provider to review       Sjogren's syndrome (H) 10/12/2011     Priority: Medium             Past Surgical History:   No past surgical history on file.          Social History:     Social History     Tobacco Use     Smoking status: Former Smoker     Packs/day: 0.50     Smokeless tobacco: Never Used   Substance Use Topics     Alcohol use: Yes     Comment: Occasional use             Family History:     Family History   Problem Relation Age of Onset     Sjogren's Daughter             Allergies:     Allergies   Allergen Reactions     Dust Mite Extract      Sneezing, itchy nose.     Penicillins Rash     Sulfa Drugs Rash             Medications:     Current Outpatient Medications   Medication Sig Dispense Refill     Acetaminophen (TYLENOL PO) Take 1,000 mg by mouth every 6 hours as needed for mild pain or fever       amphetamine-dextroamphetamine (ADDERALL) 20 MG per tablet Take 10 mg by mouth 2 times daily       atenolol (TENORMIN) 25 MG tablet 2 tablets daily. One in the morning, one at bedtime.       Cholecalciferol (VITAMIN D3) 1000 units CAPS Take 1,000 Units by mouth daily       ClonAZEPAM (KLONOPIN) 0.5 MG tablet Take 1 tab during day and 2 at bedtime as needed (max of 3 tabs per 24 hours)       cyclobenzaprine (FLEXERIL) 10 MG tablet Take 1 tablet 3 times daily as needed       hydrochlorothiazide (HYDRODIURIL) 50 MG tablet Take 25 mg by mouth daily.       hydroxychloroquine (PLAQUENIL) 200 MG tablet Take 1 tablet (200 mg) by mouth 2 times daily 180 tablet 0     levothyroxine (SYNTHROID/LEVOTHROID) 100 MCG  tablet Take 100 mcg by mouth daily  3     metoprolol succinate (TOPROL-XL) 50 MG 24 hr tablet Take 50 mg by mouth daily       mometasone (NASONEX) 50 MCG/ACT nasal spray 1 spray in each nostril once daily       Multiple Vitamins-Minerals (CENTRUM SILVER PO) Take  by mouth.       pantoprazole (PROTONIX) 40 MG enteric coated tablet Take 1 tablet by mouth 2 times daily.       ranitidine (ZANTAC) 150 MG tablet Take one at bedtime daily       rOPINIRole (REQUIP) 1 MG tablet Take 1 mg in afternoon and 5 mg at bedtime       sertraline (ZOLOFT) 100 MG tablet Take 150 mg by mouth every morning       simvastatin (ZOCOR) 20 MG tablet Take one tab daily       traMADol (ULTRAM) 50 MG tablet Take 50 mg by mouth every 6 hours as needed for pain              Physical Exam:   Blood pressure 117/74, pulse 72, weight 96 kg (211 lb 11.2 oz), SpO2 95 %.  Constitutional: WD-WN-WG cooperative   Eyes: nl EOM, conjunctiva, sclera   ENT: nl external ears, nose, throat   No mucous membrane lesions, diminished saliva pool   Neck: no mass or thyroid enlargement   GI: no ABD mass or tenderness, no HSM   Lymph: no cervical, supraclavicular, inguinal or epitrochlear nodes   MS: All TMJ, neck, shoulder, elbow, wrist, MCP/PIP/DIP, spine, hip, knee, ankle, and foot MTP/IP joints were examined and found without definite active synovitis. She has a mildly tender Heberden's node R 2nd finger with other developed non tender Heberden and Giselle nodes. No instability L thumb IP. 1+S and pain with finger curl R 2nd and 3rd fingers. Lacks 10+ degrees extension L elbow. Bilat TKR scars. No dactylitis, tenosynovitis, enthesopathy   Skin: no nail pitting, alopecia, rash, nodules or lesions   Neuro: nl cranial nerves, strength   Psych: nl affect.         Data:     Lab Results   Component Value Date    WBC 7.3 12/22/2016    WBC 5.3 12/23/2015    WBC 5.2 12/22/2014    HGB 13.6 12/22/2016    HGB 13.2 12/23/2015    HGB 13.8 12/22/2014    HCT 42.1 12/22/2016    HCT  41.0 12/23/2015    HCT 42.6 12/22/2014    MCV 95 12/22/2016    MCV 95 12/23/2015    MCV 96 12/22/2014     12/22/2016     12/23/2015     12/22/2014     Lab Results   Component Value Date    BUN 14 06/13/2012    BUN 22 05/16/2012    BUN 21 06/16/2010     No components found for: SEDRATE  Lab Results   Component Value Date    TSH 1.85 06/16/2010     Lab Results   Component Value Date    AST 25 12/22/2016    AST 22 12/23/2015    AST 23 12/22/2014    ALT 27 12/22/2016    ALT 24 12/23/2015    ALT 30 12/22/2014    ALKPHOS 104 05/16/2012     Reviewed Rheumatology lab flowsheet    Moi Jaramillo

## 2019-09-30 ENCOUNTER — HEALTH MAINTENANCE LETTER (OUTPATIENT)
Age: 74
End: 2019-09-30

## 2019-12-16 ENCOUNTER — TELEPHONE (OUTPATIENT)
Dept: RHEUMATOLOGY | Facility: CLINIC | Age: 74
End: 2019-12-16

## 2019-12-16 NOTE — TELEPHONE ENCOUNTER
Left message for pt reminding them of upcoming appointment.  Instructed pt to bring updated medications list.  brandie lind

## 2019-12-17 ENCOUNTER — ANCILLARY PROCEDURE (OUTPATIENT)
Dept: ULTRASOUND IMAGING | Facility: CLINIC | Age: 74
End: 2019-12-17
Attending: INTERNAL MEDICINE
Payer: COMMERCIAL

## 2019-12-17 ENCOUNTER — OFFICE VISIT (OUTPATIENT)
Dept: RHEUMATOLOGY | Facility: CLINIC | Age: 74
End: 2019-12-17
Attending: INTERNAL MEDICINE
Payer: COMMERCIAL

## 2019-12-17 VITALS
WEIGHT: 200 LBS | OXYGEN SATURATION: 100 % | TEMPERATURE: 98.1 F | DIASTOLIC BLOOD PRESSURE: 75 MMHG | BODY MASS INDEX: 34.33 KG/M2 | HEART RATE: 86 BPM | SYSTOLIC BLOOD PRESSURE: 111 MMHG

## 2019-12-17 DIAGNOSIS — M79.89 SWELLING OF LOWER LEG: ICD-10-CM

## 2019-12-17 DIAGNOSIS — M32.9 SYSTEMIC LUPUS ERYTHEMATOSUS, UNSPECIFIED SLE TYPE, UNSPECIFIED ORGAN INVOLVEMENT STATUS (H): ICD-10-CM

## 2019-12-17 DIAGNOSIS — M32.9 SYSTEMIC LUPUS ERYTHEMATOSUS, UNSPECIFIED SLE TYPE, UNSPECIFIED ORGAN INVOLVEMENT STATUS (H): Primary | ICD-10-CM

## 2019-12-17 LAB
ALBUMIN UR-MCNC: NEGATIVE MG/DL
ALT SERPL W P-5'-P-CCNC: 26 U/L (ref 0–50)
APPEARANCE UR: CLEAR
AST SERPL W P-5'-P-CCNC: 22 U/L (ref 0–45)
BACTERIA #/AREA URNS HPF: ABNORMAL /HPF
BASOPHILS # BLD AUTO: 0 10E9/L (ref 0–0.2)
BASOPHILS NFR BLD AUTO: 0.7 %
BILIRUB UR QL STRIP: NEGATIVE
COLOR UR AUTO: YELLOW
CREAT SERPL-MCNC: 1.19 MG/DL (ref 0.52–1.04)
DIFFERENTIAL METHOD BLD: ABNORMAL
EOSINOPHIL # BLD AUTO: 0.2 10E9/L (ref 0–0.7)
EOSINOPHIL NFR BLD AUTO: 2.6 %
ERYTHROCYTE [DISTWIDTH] IN BLOOD BY AUTOMATED COUNT: 14.3 % (ref 10–15)
GFR SERPL CREATININE-BSD FRML MDRD: 45 ML/MIN/{1.73_M2}
GLUCOSE UR STRIP-MCNC: NEGATIVE MG/DL
HCT VFR BLD AUTO: 41.2 % (ref 35–47)
HGB BLD-MCNC: 12.9 G/DL (ref 11.7–15.7)
HGB UR QL STRIP: NEGATIVE
HYALINE CASTS #/AREA URNS LPF: 9 /LPF (ref 0–2)
IMM GRANULOCYTES # BLD: 0 10E9/L (ref 0–0.4)
IMM GRANULOCYTES NFR BLD: 0.3 %
KETONES UR STRIP-MCNC: NEGATIVE MG/DL
LEUKOCYTE ESTERASE UR QL STRIP: ABNORMAL
LYMPHOCYTES # BLD AUTO: 1.5 10E9/L (ref 0.8–5.3)
LYMPHOCYTES NFR BLD AUTO: 24.9 %
MCH RBC QN AUTO: 30.1 PG (ref 26.5–33)
MCHC RBC AUTO-ENTMCNC: 31.3 G/DL (ref 31.5–36.5)
MCV RBC AUTO: 96 FL (ref 78–100)
MONOCYTES # BLD AUTO: 0.5 10E9/L (ref 0–1.3)
MONOCYTES NFR BLD AUTO: 7.8 %
MUCOUS THREADS #/AREA URNS LPF: PRESENT /LPF
NEUTROPHILS # BLD AUTO: 3.7 10E9/L (ref 1.6–8.3)
NEUTROPHILS NFR BLD AUTO: 63.7 %
NITRATE UR QL: NEGATIVE
NRBC # BLD AUTO: 0 10*3/UL
NRBC BLD AUTO-RTO: 0 /100
PH UR STRIP: 6 PH (ref 5–7)
PLATELET # BLD AUTO: 192 10E9/L (ref 150–450)
RBC # BLD AUTO: 4.28 10E12/L (ref 3.8–5.2)
RBC #/AREA URNS AUTO: 1 /HPF (ref 0–2)
SOURCE: ABNORMAL
SP GR UR STRIP: 1.01 (ref 1–1.03)
SQUAMOUS #/AREA URNS AUTO: <1 /HPF (ref 0–1)
UROBILINOGEN UR STRIP-MCNC: 0 MG/DL (ref 0–2)
WBC # BLD AUTO: 5.9 10E9/L (ref 4–11)
WBC #/AREA URNS AUTO: 12 /HPF (ref 0–5)

## 2019-12-17 PROCEDURE — 81001 URINALYSIS AUTO W/SCOPE: CPT | Performed by: INTERNAL MEDICINE

## 2019-12-17 PROCEDURE — 87086 URINE CULTURE/COLONY COUNT: CPT | Performed by: INTERNAL MEDICINE

## 2019-12-17 PROCEDURE — 84450 TRANSFERASE (AST) (SGOT): CPT | Performed by: INTERNAL MEDICINE

## 2019-12-17 PROCEDURE — 82565 ASSAY OF CREATININE: CPT | Performed by: INTERNAL MEDICINE

## 2019-12-17 PROCEDURE — 87186 SC STD MICRODIL/AGAR DIL: CPT | Performed by: INTERNAL MEDICINE

## 2019-12-17 PROCEDURE — 36415 COLL VENOUS BLD VENIPUNCTURE: CPT | Performed by: INTERNAL MEDICINE

## 2019-12-17 PROCEDURE — G0463 HOSPITAL OUTPT CLINIC VISIT: HCPCS | Mod: ZF

## 2019-12-17 PROCEDURE — 84460 ALANINE AMINO (ALT) (SGPT): CPT | Performed by: INTERNAL MEDICINE

## 2019-12-17 PROCEDURE — 85025 COMPLETE CBC W/AUTO DIFF WBC: CPT | Performed by: INTERNAL MEDICINE

## 2019-12-17 PROCEDURE — 87088 URINE BACTERIA CULTURE: CPT | Performed by: INTERNAL MEDICINE

## 2019-12-17 RX ORDER — FAMOTIDINE 20 MG/1
20 TABLET, FILM COATED ORAL
COMMUNITY
Start: 2019-11-15

## 2019-12-17 RX ORDER — ROPINIROLE 5 MG/1
TABLET, FILM COATED ORAL
Refills: 3 | COMMUNITY
Start: 2019-08-27

## 2019-12-17 RX ORDER — FLUTICASONE PROPIONATE 50 MCG
SPRAY, SUSPENSION (ML) NASAL
Refills: 33 | COMMUNITY
Start: 2019-12-06

## 2019-12-17 RX ORDER — POTASSIUM CHLORIDE 1500 MG/1
TABLET, EXTENDED RELEASE ORAL
Refills: 1 | COMMUNITY
Start: 2019-11-24

## 2019-12-17 RX ORDER — TRIAMCINOLONE ACETONIDE 1 MG/G
CREAM TOPICAL
Refills: 0 | COMMUNITY
Start: 2019-05-21

## 2019-12-17 ASSESSMENT — PAIN SCALES - GENERAL: PAINLEVEL: NO PAIN (0)

## 2019-12-17 NOTE — PATIENT INSTRUCTIONS
Diagnosis:  1. Systemic lupus erythematosus, quiescent  2. L leg swelling    Plan:  1. Obtain ultrasound of the left lower leg.  2. Continue hydroxychloroquine 400 mg weekly.  3. Bloodwork, urinalysis today.

## 2019-12-17 NOTE — LETTER
2019      RE: Shu Duckworth  [address] [facility] [address]       [facility]  Rheumatology Clinic  Deng Pruett MD  2019     Name: Shu Duckworth  MRN: [id number]  Age: 74 year old  : 1945  Referring provider: Referred Self    Assessment and Plan:  # Systemic lupus erythematosus with secondary Sjögren's syndrome (malar rash, arthralgia, sicca symptoms, +KATIE, +dsDNA, hypocomplementemia, +Schirmer's test):  Mrs. Duckworth reports minimal symptoms other than left leg pain and swelling of several week's duration. Exam shows slightly increased circumference of the left leg 10 cm above the left ankle. There is no tenderness and Nakia's sign is negative. Blood work from 2019 showed creatinine of 1.19, stable; LFTs and CBC were normal.     Systemic lupus erythematosus is quiescent. I recommend continuing Plaquenil monotherapy 400 mg daily. While on the medication, patient should have annual ophthalmology evaluation for retinal toxicity. Most recent exam per her report was conducted in May 2019. Recheck CBC, creatinine, and urinalysis today. Continue symptomatic treatment for mild sicca and dry eye symptoms.    # L leg swelling: differential diagnosis includes muscular pain, thrombosis. Plan to obtain doppler ultrasounds of the left lower leg to rule out thrombosis.  # Osteoarthritis, thumbs:  Patient is working with Hand services to address. No indication for immunomodulatory therapy.  # Ulnar neuropathy, L;  Symptoms reflect post-operative change, following L elbow replacement in 2018. Stable.    Return to clinic in 6 months.     Orders:  - Creatinine  - CBC with platelets differential  - AST  - ALT  - Routine UA with Micro Reflex to Culture  - US Lower Extremity Venous Duplex Left     Follow-up: Return in about 6 months (around 2020).    HPI:   Shu Duckworth is a 74 year old female with a history including systemic lupus erythematosus with secondary Sjögren's syndrome  (+sicca symptoms, +Schirmer test, -minor salivary gland biopsy) who presents for follow-up. The patient was last evaluated by Dr. Jaramillo on 08/08/2019 in follow up of lupus. Symptoms were judged reasonably controlled on Plaquenil. Plaquenil was continue at 400 mg a day.    Today, the patient reports that she has been doing well. She states that she has pain all over, most prominently in her legs. She explains that her legs will get very weak after working on crafts in her basement for prolonged periods of time. She notes that this may be due to the fact that her basement floor is made on concrete. However, she still has good strength and balance after doing 4 miles on a treadmill. She adds that her legs do not feel like they are her own when they become weak, and if she is to sit down for around 20 minutes, this weakness will improve. She reports that her leg weakness makes her gait feel unstable.     She endorses numbness and tingling in the ulnar left hand and fifth finger after a recent elbow replacement revision procedure. She states that her numbness causes her to drop objects. She reports that her surgeon is aware of the issue, but did not want to fix this emergently. She notes that she received a second opinion from a surgeon in [place]. She states that her care team decided to wait on this surgery until May, so she will watch and wait until that time.     The patient reports that she has pain in the small joints of her hands for which she normally soaks them in warm water. She denies any morning stiffness in her hands. She does state that her pain is severe enough that she takes 2 Tylenol tablets daily which does improve her pain. The patient states that she has dry mouth and dry eyes as well.    She explains that she has not had a kidney biopsy, and her lupus diagnosis took place in 2010 when her daughter was diagnosed with autoimmune pancreatitis. The patient was then started on Plaquenil and had followed  with Dr. Jaramillo regularly since that time. She continues hydroxychloroquine 400 mg daily without any issues.     Patient saw Dr. Trejo in Neurology on November 11th for evaluation of hand numbness. Impression was of left hand numbness and weakness. EMG was ordered. Results were compatible with left-sided median neuropathy (carpal tunnel syndrome); left side ulnar neuropathy was also noted.     The patient saw Dr. Shrestha in Ophthalmology in October 2018. No Plaquenil retinal toxicity was noted.     She saw Dr. Duran at [facility] in August 2019 for Plaquenil retinal toxicity OCT.      Review of Systems:   Pertinent items are noted in HPI or as below, remainder of complete ROS is negative.      No recent problems with hearing or vision.   No breathing difficulty, shortness of breath, coughing, or wheezing.  No chest pain or palpitations.  No heart burn, indigestion, abdominal pain, nausea, vomiting, diarrhea.  No urination problems, no bloody, cloudy urine, no dysuria.  No headaches or confusion.  No rashes. No easy bleeding or bruising.     Active Medications:   Current Outpatient Medications:      Acetaminophen (TYLENOL PO), Take 1,000 mg by mouth every 6 hours as needed for mild pain or fever, Disp: , Rfl:      amphetamine-dextroamphetamine (ADDERALL) 20 MG per tablet, Take 10 mg by mouth 2 times daily, Disp: , Rfl:      atenolol (TENORMIN) 25 MG tablet, 2 tablets daily. One in the morning, one at bedtime., Disp: , Rfl:      Cholecalciferol (VITAMIN D3) 1000 units CAPS, Take 1,000 Units by mouth daily, Disp: , Rfl:      ClonAZEPAM (KLONOPIN) 0.5 MG tablet, Take 1 tab during day and 2 at bedtime as needed (max of 3 tabs per 24 hours), Disp: , Rfl:      famotidine (PEPCID) 20 MG tablet, Take 20 mg by mouth, Disp: , Rfl:      fluticasone (FLONASE) 50 MCG/ACT nasal spray, SPRAY 1 SPRAY INTO EACH NOSTRIL EVERY DAY, Disp: , Rfl: 33     hydrochlorothiazide (HYDRODIURIL) 50 MG tablet, Take 25 mg by mouth  daily., Disp: , Rfl:      hydroxychloroquine (PLAQUENIL) 200 MG tablet, Take 1 tablet (200 mg) by mouth 2 times daily, Disp: 180 tablet, Rfl: 3     levothyroxine (SYNTHROID/LEVOTHROID) 100 MCG tablet, Take 100 mcg by mouth daily, Disp: , Rfl: 3     metoprolol succinate (TOPROL-XL) 50 MG 24 hr tablet, Take 50 mg by mouth daily, Disp: , Rfl:      mometasone (NASONEX) 50 MCG/ACT nasal spray, 1 spray in each nostril once daily, Disp: , Rfl:      Multiple Vitamins-Minerals (CENTRUM SILVER PO), Take  by mouth., Disp: , Rfl:      pantoprazole (PROTONIX) 40 MG enteric coated tablet, Take 1 tablet by mouth 2 times daily., Disp: , Rfl:      potassium chloride ER (K-DUR/KLOR-CON M) 20 MEQ CR tablet, TAKE 1 TABLET BY MOUTH EVERY DAY WITH FOOD, Disp: , Rfl: 1     ranitidine (ZANTAC) 150 MG tablet, Take one at bedtime daily, Disp: , Rfl:      rOPINIRole (REQUIP) 1 MG tablet, Take 1 mg in afternoon and 5 mg at bedtime, Disp: , Rfl:      rOPINIRole (REQUIP) 5 MG tablet, TAKE 1 TABLET BY MOUTH EVERY DAY IN THE EVENING, Disp: , Rfl: 3     sertraline (ZOLOFT) 100 MG tablet, Take 150 mg by mouth every morning, Disp: , Rfl:      simvastatin (ZOCOR) 20 MG tablet, Take one tab daily, Disp: , Rfl:      traMADol (ULTRAM) 50 MG tablet, Take 50 mg by mouth every 6 hours as needed for pain, Disp: , Rfl:      triamcinolone (KENALOG) 0.1 % external cream, APPLY A THIN LAYER TO THE AFFECTED AREA(S) BY TOPICAL ROUTE 3 TIMES PER DAY, Disp: , Rfl: 0     cyclobenzaprine (FLEXERIL) 10 MG tablet, Take 1 tablet 3 times daily as needed, Disp: , Rfl:     Allergies:  Dust Mite Extract   Penicillins   Sulfa Drugs     Past Medical History:  Encounter for long-term current use of medication  Lymphocytic colitis   Renal insufficiency   Hypokalemia   Diastolic dysfunction   Tobacco use disorder   Shortness of breath   Sjögren's syndrome   Vitamin D deficiency   Systemic lupus erythematosus   Migraines   Overweight  Primary osteoarthritis   Esophageal  reflux   Anemia, unspecified   Attention deficit disorder without mention of hyperactivity   Generalized anxiety disorder   Episodic Mood Disorder   Hypothyroid   Hyperlipidemia    Allergic rhinitis seasonal    Hypertension     Past Surgical History:  Hysterectomy 1/1/1978 - 12/31/1978  (IA) IN Bone Graft Tib Fib FX W BMP 1/1/1992 - 12/31/1992    Bunionectomy, right   Knee replacement, left 1/1/2006 - 12/31/2006      Thumb(s) surgery  Knee replacement, right 6/1/2008 - 6/30/2008  Upper arm/elbow surgery 12/1/2008 - 12/31/2008       Colonoscopy screening 1/27/11   Bladder suspension 10/1/2005 - 10/31/2005       Colon surgery 6/1/2018 - 6/30/2018      Family History:    The patient's family history includes Sjogren's in her daughter; lupus (with more skin involvement) in her sister.    Social History:  The patient reports that she has quit smoking. She smoked 0.50 packs per day. She has never used smokeless tobacco. She reports current alcohol use. She reports that she does not use drugs. She is a retired .  PCP: Johnson Arnold MD  Marital Status:      Physical Exam:   /75 (BP Location: Right arm, Patient Position: Sitting, Cuff Size: Adult Regular)   Pulse 86   Temp 98.1  F (36.7  C)   Wt 90.7 kg (200 lb)   SpO2 100%   BMI 34.33 kg/m      Wt Readings from Last 4 Encounters:   12/17/19 90.7 kg (200 lb)   08/08/19 96 kg (211 lb 11.2 oz)   10/04/18 96.6 kg (212 lb 14.4 oz)   12/22/16 93.9 kg (207 lb)     Constitutional: Well-developed, appearing stated age; cooperative.  Eyes: Normal EOM, PERRLA, vision, conjunctiva, sclera.  ENT: Normal external ears, nose, hearing, lips, teeth, gums, throat. No mucous membrane lesions. Moderately reduced anterior saliva flow.  Neck: No mass or thyroid enlargement.  Resp: Lungs clear to auscultation, nl to palpation.  CV: RRR, no murmurs, rubs or gallops, no edema.  GI: No ABD mass or tenderness, no HSM.  : Not tested.  Lymph: No cervical,  supraclavicular, inguinal or epitrochlear nodes.  MS: The TMJ, neck, shoulder, spine, and hip joints were examined and found normal. No active synovitis or altered joint anatomy. No dactylitis, tenosynovitis, enthesopathy. On the right hand, there is bony angulation in the knuckles moreso than left hand, no visible swelling of the soft tissues. Good fist formation. Left elbow does not fully extend. No evidence of inflammatory changes in the joints of the feet/toes. Left leg above the ankle seems slightly wider in circumference than the right and is also slightly erythematous. No palpable cords or tenderness in the left leg.  Skin: No nail pitting, alopecia, rash, nodules or lesions.  Neuro: Normal cranial nerves, strength, sensation, DTRs.   Psych: Normal judgement, orientation, memory, affect.     Imaging:    Laboratory:   RHEUM RESULTS Latest Ref Rng & Units 12/22/2016 10/4/2018 12/17/2019   COMPLEMENT C3 76 - 169 mg/dL 104 97 -   COMPLEMENT C4 15 - 50 mg/dL 9(L) 8(L) -   DNA-DS <10 IU/mL 1 1 -   SED RATE 0 - 30 mm/h - - -   CRP, INFLAMMATION 0.0 - 8.0 mg/L 3.3 - -   CK TOTAL 30 - 225 U/L - - -   RHEUMATOID FACTOR 0 - 14 IU/mL - - -   KATIE SCREEN BY EIA 0 - 1.0 - - -   AST 0 - 45 U/L 25 - -   ALT 0 - 50 U/L 27 - -   ALBUMIN 3.3 - 4.9 g/dL - - -   WBC 4.0 - 11.0 10e9/L 7.3 - 5.9   RBC 3.8 - 5.2 10e12/L 4.45 - 4.28   HGB 11.7 - 15.7 g/dL 13.6 - 12.9   HCT 35.0 - 47.0 % 42.1 - 41.2   MCV 78 - 100 fl 95 - 96   MCHC 31.5 - 36.5 g/dL 32.3 - 31.3(L)   RDW 10.0 - 15.0 % 13.2 - 14.3    - 450 10e9/L 219 - 192   CREATININE 0.52 - 1.04 mg/dL 1.31(H) - -   GFR ESTIMATE, IF BLACK >60 mL/min/1.7m2 49(L) - -   GFR ESTIMATE >60 mL/min/1.7m2 40(L) - -   HEPATITIS C ANTIBODY NEG - - -     Rheumatoid Factor   Date Value Ref Range Status   06/16/2010 10 0 - 14 IU/mL Final     Ribonucleic Protein IgG Antibody   Date Value Ref Range Status   06/16/2010 0  Final     Comment:     Reference range: 0 to 40  Unit:  AU/mL  (Note)  REFERENCE INTERVAL: Ribonucleic Protein (SAMANTHA), IgG   29 AU/mL or Less ............. Negative   30 - 40 AU/mL ................ Equivocal   41 AU/mL or Greater .......... Positive    RNP antibody is seen in % of mixed connective tissue  disease and is considered specific for this syndrome if  other antibodies are negative. RNP is also present in  20-30% of systemic lupus erythematosus (SLE) and 15-25%  of progressive systemic sclerosis (PSS).  Performed by [facility],  [address] [place] [address] [phone]  [email or web address], Virgen Casillas MD, Lab. Director     Cordero Antibody IgG   Date Value Ref Range Status   06/16/2010 0  Final     Comment:     Reference range: 0 to 40  Unit: AU/mL  (Note)  REFERENCE INTERVALS: SMITH (SAMANTHA) Ab, IgG   29 AU/mL or Less ............. Negative   30 - 40 AU/mL ................ Equivocal   41 AU/mL or Greater .......... Positive    Cordero antibody is very specific for systemic lupus  erythematosus (SLE) but only occurs in 30-35% of SLE  cases. The presence of antibodies to Cordero is often  associated with renal disease.  Performed by [facility],  [address] [facility] [place] [address] [phone]  [email or web address], Virgen Casillas MD, Lab. Director     SSA (RO) Antibody IgG   Date Value Ref Range Status   06/16/2010 4  Final     Comment:     Reference range: 0 to 40  Unit: AU/mL  (Note)  REFERENCE INTERVALS: SSA (Ro) (SAMANTHA) Ab, IgG   29 AU/mL or Less ............. Negative   30 - 40 AU/mL ................ Equivocal   41 AU/mL or Greater .......... Positive    SSA (Ro) antibody is seen in 70-75% of Sjogren syndrome  cases, 30-40% of systemic lupus erythematosus (SLE) and  5-10% of progressive systemic sclerosis (PSS).  Performed by [facility],  [address], [facility] [address] [phone]  [email or web address], Virgen Casillas MD, Lab. Director     SSB (LA) Antibody IgG   Date Value Ref Range Status   06/16/2010 0  Final     Comment:     Reference  range: 0 to 40  Unit: AU/mL  (Note)  REFERENCE INTERVALS: SSB (La) (SAMANTHA) Ab, IgG   29 AU/mL or Less ............. Negative   30 - 40 AU/mL ................ Equivocal   41 AU/mL or Greater .......... Positive    SSB (La) antibody is seen in 50-60% of Sjogren syndrome  cases and is specific if it is the only SAMANTHA antibody  present. 15-25% of patients with systemic lupus  erythematosus (SLE) and 5-10% of patients with progressive  systemic sclerosis (PSS) also have this antibody.  Performed by [facility],  [address] [place] [id number] [phone]  [email or web address], Virgen Casillas MD, Lab. Director     KATIE Screen by EIA   Date Value Ref Range Status   06/16/2010 1.2 (H) 0 - 1.0 Final     Comment:     Interpretation:  Weakly Positive     DNA-ds   Date Value Ref Range Status   10/04/2018 1 <10 IU/mL Final     Comment:     Negative     Hep B Surface Agn   Date Value Ref Range Status   10/12/2011 Negative NEG Final     Albumin Fraction   Date Value Ref Range Status   12/22/2016 4.1 3.7 - 5.1 g/dL Final     Alpha 2 Fraction   Date Value Ref Range Status   12/22/2016 0.9 0.5 - 0.9 g/dL Final     Beta Fraction   Date Value Ref Range Status   12/22/2016 0.8 0.6 - 1.0 g/dL Final     Gamma Fraction   Date Value Ref Range Status   12/22/2016 0.8 0.7 - 1.6 g/dL Final     Monoclonal Peak   Date Value Ref Range Status   12/22/2016 0.0 0.0 g/dL Final     ELP Interpretation:   Date Value Ref Range Status   12/22/2016   Final    Essentially normal electrophoretic pattern.  No monoclonal protein seen.   Pathologic significance requires clinical correlation.  ISIDRO Murguia M.D.,   Ph.D., Pathologist ().       Scribe Disclosure:  Jeff HUNTLEY, am serving as a scribe to document services personally performed by Deng Pruett MD at this visit, based upon the provider's statements to me. All documentation has been reviewed by the aforementioned provider prior to being entered into the official medical  record.       Deng Pruett MD

## 2019-12-17 NOTE — NURSING NOTE
Chief Complaint   Patient presents with     RECHECK     former  patient of Dr. Jaramillo         /75 (BP Location: Right arm, Patient Position: Sitting, Cuff Size: Adult Regular)   Pulse 86   Temp 98.1  F (36.7  C)   Wt 90.7 kg (200 lb)   SpO2 100%   BMI 34.33 kg/m        Piyush Harry, EMT

## 2019-12-17 NOTE — LETTER
2019       RE: Shu Duckworth  [address]  [facility] [id number]     Dear Colleague,    Thank you for referring your patient, Shu Duckworth, to the [facility]UMATOLOG[facility]. Please see a copy of my visit note below.    [facility]  Rheumatology Clinic  Deng Pruett MD  2019     Name: Shu Duckworth  MRN: [id number]  Age: 74 year old  : 1945  Referring provider: Referred Self    Assessment and Plan:  # Systemic lupus erythematosus with secondary Sjögren's syndrome (malar rash, arthralgia, sicca symptoms, +KATIE, +dsDNA, hypocomplementemia, +Schirmer's test):  Mrs. Duckworth reports minimal symptoms other than left leg pain and swelling of several week's duration. Exam shows slightly increased circumference of the left leg 10 cm above the left ankle. There is no tenderness and Nakia's sign is negative. Blood work from 2019 showed creatinine of 1.19, stable; LFTs and CBC were normal.     Systemic lupus erythematosus is quiescent. I recommend continuing Plaquenil monotherapy 400 mg daily. While on the medication, patient should have annual ophthalmology evaluation for retinal toxicity. Most recent exam per her report was conducted in May 2019. Recheck CBC, creatinine, and urinalysis today. Continue symptomatic treatment for mild sicca and dry eye symptoms.    # L leg swelling: differential diagnosis includes muscular pain, thrombosis. Plan to obtain doppler ultrasounds of the left lower leg to rule out thrombosis.  # Osteoarthritis, thumbs:  Patient is working with Hand services to address. No indication for immunomodulatory therapy.  # Ulnar neuropathy, L;  Symptoms reflect post-operative change, following L elbow replacement in 2018. Stable.    Return to clinic in 6 months.     Orders:  - Creatinine  - CBC with platelets differential  - AST  - ALT  - Routine UA with Micro Reflex to Culture  - US Lower Extremity Venous Duplex Left      Follow-up: Return in about 6 months (around 6/17/2020).    HPI:   Shu Duckworth is a 74 year old female with a history including systemic lupus erythematosus with secondary Sjögren's syndrome (+sicca symptoms, +Schirmer test, -minor salivary gland biopsy) who presents for follow-up. The patient was last evaluated by Dr. Jaramillo on 08/08/2019 in follow up of lupus. Symptoms were judged reasonably controlled on Plaquenil. Plaquenil was continue at 400 mg a day.    Today, the patient reports that she has been doing well. She states that she has pain all over, most prominently in her legs. She explains that her legs will get very weak after working on crafts in her basement for prolonged periods of time. She notes that this may be due to the fact that her basement floor is made on concrete. However, she still has good strength and balance after doing 4 miles on a treadmill. She adds that her legs do not feel like they are her own when they become weak, and if she is to sit down for around 20 minutes, this weakness will improve. She reports that her leg weakness makes her gait feel unstable.     She endorses numbness and tingling in the ulnar left hand and fifth finger after a recent elbow replacement revision procedure. She states that her numbness causes her to drop objects. She reports that her surgeon is aware of the issue, but did not want to fix this emergently. She notes that she received a second opinion from a surgeon in [place]. She states that her care team decided to wait on this surgery until May, so she will watch and wait until that time.     The patient reports that she has pain in the small joints of her hands for which she normally soaks them in warm water. She denies any morning stiffness in her hands. She does state that her pain is severe enough that she takes 2 Tylenol tablets daily which does improve her pain. The patient states that she has dry mouth and dry eyes as well.    She explains that  she has not had a kidney biopsy, and her lupus diagnosis took place in 2010 when her daughter was diagnosed with autoimmune pancreatitis. The patient was then started on Plaquenil and had followed with Dr. Jaramillo regularly since that time. She continues hydroxychloroquine 400 mg daily without any issues.     Patient saw Dr. Trejo in Neurology on November 11th for evaluation of hand numbness. Impression was of left hand numbness and weakness. EMG was ordered. Results were compatible with left-sided median neuropathy (carpal tunnel syndrome); left side ulnar neuropathy was also noted.     The patient saw Dr. Shrestha in Ophthalmology in October 2018. No Plaquenil retinal toxicity was noted.     She saw Dr. Duran at [facility] in August 2019 for Plaquenil retinal toxicity OCT.      Review of Systems:   Pertinent items are noted in HPI or as below, remainder of complete ROS is negative.      No recent problems with hearing or vision.   No breathing difficulty, shortness of breath, coughing, or wheezing.  No chest pain or palpitations.  No heart burn, indigestion, abdominal pain, nausea, vomiting, diarrhea.  No urination problems, no bloody, cloudy urine, no dysuria.  No headaches or confusion.  No rashes. No easy bleeding or bruising.     Active Medications:   Current Outpatient Medications:      Acetaminophen (TYLENOL PO), Take 1,000 mg by mouth every 6 hours as needed for mild pain or fever, Disp: , Rfl:      amphetamine-dextroamphetamine (ADDERALL) 20 MG per tablet, Take 10 mg by mouth 2 times daily, Disp: , Rfl:      atenolol (TENORMIN) 25 MG tablet, 2 tablets daily. One in the morning, one at bedtime., Disp: , Rfl:      Cholecalciferol (VITAMIN D3) 1000 units CAPS, Take 1,000 Units by mouth daily, Disp: , Rfl:      ClonAZEPAM (KLONOPIN) 0.5 MG tablet, Take 1 tab during day and 2 at bedtime as needed (max of 3 tabs per 24 hours), Disp: , Rfl:      famotidine (PEPCID) 20 MG tablet, Take 20 mg by mouth,  Disp: , Rfl:      fluticasone (FLONASE) 50 MCG/ACT nasal spray, SPRAY 1 SPRAY INTO EACH NOSTRIL EVERY DAY, Disp: , Rfl: 33     hydrochlorothiazide (HYDRODIURIL) 50 MG tablet, Take 25 mg by mouth daily., Disp: , Rfl:      hydroxychloroquine (PLAQUENIL) 200 MG tablet, Take 1 tablet (200 mg) by mouth 2 times daily, Disp: 180 tablet, Rfl: 3     levothyroxine (SYNTHROID/LEVOTHROID) 100 MCG tablet, Take 100 mcg by mouth daily, Disp: , Rfl: 3     metoprolol succinate (TOPROL-XL) 50 MG 24 hr tablet, Take 50 mg by mouth daily, Disp: , Rfl:      mometasone (NASONEX) 50 MCG/ACT nasal spray, 1 spray in each nostril once daily, Disp: , Rfl:      Multiple Vitamins-Minerals (CENTRUM SILVER PO), Take  by mouth., Disp: , Rfl:      pantoprazole (PROTONIX) 40 MG enteric coated tablet, Take 1 tablet by mouth 2 times daily., Disp: , Rfl:      potassium chloride ER (K-DUR/KLOR-CON M) 20 MEQ CR tablet, TAKE 1 TABLET BY MOUTH EVERY DAY WITH FOOD, Disp: , Rfl: 1     ranitidine (ZANTAC) 150 MG tablet, Take one at bedtime daily, Disp: , Rfl:      rOPINIRole (REQUIP) 1 MG tablet, Take 1 mg in afternoon and 5 mg at bedtime, Disp: , Rfl:      rOPINIRole (REQUIP) 5 MG tablet, TAKE 1 TABLET BY MOUTH EVERY DAY IN THE EVENING, Disp: , Rfl: 3     sertraline (ZOLOFT) 100 MG tablet, Take 150 mg by mouth every morning, Disp: , Rfl:      simvastatin (ZOCOR) 20 MG tablet, Take one tab daily, Disp: , Rfl:      traMADol (ULTRAM) 50 MG tablet, Take 50 mg by mouth every 6 hours as needed for pain, Disp: , Rfl:      triamcinolone (KENALOG) 0.1 % external cream, APPLY A THIN LAYER TO THE AFFECTED AREA(S) BY TOPICAL ROUTE 3 TIMES PER DAY, Disp: , Rfl: 0     cyclobenzaprine (FLEXERIL) 10 MG tablet, Take 1 tablet 3 times daily as needed, Disp: , Rfl:     Allergies:  Dust Mite Extract   Penicillins   Sulfa Drugs     Past Medical History:  Encounter for long-term current use of medication  Lymphocytic colitis   Renal insufficiency   Hypokalemia   Diastolic  dysfunction   Tobacco use disorder   Shortness of breath   Sjögren's syndrome   Vitamin D deficiency   Systemic lupus erythematosus   Migraines   Overweight  Primary osteoarthritis   Esophageal reflux   Anemia, unspecified   Attention deficit disorder without mention of hyperactivity   Generalized anxiety disorder   Episodic Mood Disorder   Hypothyroid   Hyperlipidemia    Allergic rhinitis seasonal    Hypertension     Past Surgical History:  Hysterectomy 1/1/1978 - 12/31/1978  ([place]) MN Bone Graft Tib Fib FX W BMP 1/1/1992 - 12/31/1992    Bunionectomy, right   Knee replacement, left 1/1/2006 - 12/31/2006      Thumb(s) surgery  Knee replacement, right 6/1/2008 - 6/30/2008  Upper arm/elbow surgery 12/1/2008 - 12/31/2008       Colonoscopy screening 1/27/11   Bladder suspension 10/1/2005 - 10/31/2005       Colon surgery 6/1/2018 - 6/30/2018      Family History:    The patient's family history includes Sjogren's in her daughter; lupus (with more skin involvement) in her sister.    Social History:  The patient reports that she has quit smoking. She smoked 0.50 packs per day. She has never used smokeless tobacco. She reports current alcohol use. She reports that she does not use drugs. She is a retired .  PCP: Johnson Anrold MD  Marital Status:      Physical Exam:   /75 (BP Location: Right arm, Patient Position: Sitting, Cuff Size: Adult Regular)   Pulse 86   Temp 98.1  F (36.7  C)   Wt 90.7 kg (200 lb)   SpO2 100%   BMI 34.33 kg/m      Wt Readings from Last 4 Encounters:   12/17/19 90.7 kg (200 lb)   08/08/19 96 kg (211 lb 11.2 oz)   10/04/18 96.6 kg (212 lb 14.4 oz)   12/22/16 93.9 kg (207 lb)     Constitutional: Well-developed, appearing stated age; cooperative.  Eyes: Normal EOM, PERRLA, vision, conjunctiva, sclera.  ENT: Normal external ears, nose, hearing, lips, teeth, gums, throat. No mucous membrane lesions. Moderately reduced anterior saliva flow.  Neck: No mass or thyroid  enlargement.  Resp: Lungs clear to auscultation, nl to palpation.  CV: RRR, no murmurs, rubs or gallops, no edema.  GI: No ABD mass or tenderness, no HSM.  : Not tested.  Lymph: No cervical, supraclavicular, inguinal or epitrochlear nodes.  MS: The TMJ, neck, shoulder, spine, and hip joints were examined and found normal. No active synovitis or altered joint anatomy. No dactylitis, tenosynovitis, enthesopathy. On the right hand, there is bony angulation in the knuckles moreso than left hand, no visible swelling of the soft tissues. Good fist formation. Left elbow does not fully extend. No evidence of inflammatory changes in the joints of the feet/toes. Left leg above the ankle seems slightly wider in circumference than the right and is also slightly erythematous. No palpable cords or tenderness in the left leg.  Skin: No nail pitting, alopecia, rash, nodules or lesions.  Neuro: Normal cranial nerves, strength, sensation, DTRs.   Psych: Normal judgement, orientation, memory, affect.     Imaging:    Laboratory:   RHEUM RESULTS Latest Ref Rng & Units 12/22/2016 10/4/2018 12/17/2019   COMPLEMENT C3 76 - 169 mg/dL 104 97 -   COMPLEMENT C4 15 - 50 mg/dL 9(L) 8(L) -   DNA-DS <10 IU/mL 1 1 -   SED RATE 0 - 30 mm/h - - -   CRP, INFLAMMATION 0.0 - 8.0 mg/L 3.3 - -   CK TOTAL 30 - 225 U/L - - -   RHEUMATOID FACTOR 0 - 14 IU/mL - - -   KATIE SCREEN BY EIA 0 - 1.0 - - -   AST 0 - 45 U/L 25 - -   ALT 0 - 50 U/L 27 - -   ALBUMIN 3.3 - 4.9 g/dL - - -   WBC 4.0 - 11.0 10e9/L 7.3 - 5.9   RBC 3.8 - 5.2 10e12/L 4.45 - 4.28   HGB 11.7 - 15.7 g/dL 13.6 - 12.9   HCT 35.0 - 47.0 % 42.1 - 41.2   MCV 78 - 100 fl 95 - 96   MCHC 31.5 - 36.5 g/dL 32.3 - 31.3(L)   RDW 10.0 - 15.0 % 13.2 - 14.3    - 450 10e9/L 219 - 192   CREATININE 0.52 - 1.04 mg/dL 1.31(H) - -   GFR ESTIMATE, IF BLACK >60 mL/min/1.7m2 49(L) - -   GFR ESTIMATE >60 mL/min/1.7m2 40(L) - -   HEPATITIS C ANTIBODY NEG - - -     Rheumatoid Factor   Date Value Ref Range Status    06/16/2010 10 0 - 14 IU/mL Final     Ribonucleic Protein IgG Antibody   Date Value Ref Range Status   06/16/2010 0  Final     Comment:     Reference range: 0 to 40  Unit: AU/mL  (Note)  REFERENCE INTERVAL: Ribonucleic Protein (SAMANTHA), IgG   29 AU/mL or Less ............. Negative   30 - 40 AU/mL ................ Equivocal   41 AU/mL or Greater .......... Positive    RNP antibody is seen in % of mixed connective tissue  disease and is considered specific for this syndrome if  other antibodies are negative. RNP is also present in  20-30% of systemic lupus erythematosus (SLE) and 15-25%  of progressive systemic sclerosis (PSS).  Performed by [facility],  [address] [place] [address],[place] [address] [phone]  [email or web address], Virgen Casillas MD, Lab. Director     Cordero Antibody IgG   Date Value Ref Range Status   06/16/2010 0  Final     Comment:     Reference range: 0 to 40  Unit: AU/mL  (Note)  REFERENCE INTERVALS: SMITH (SAMANTHA) Ab, IgG   29 AU/mL or Less ............. Negative   30 - 40 AU/mL ................ Equivocal   41 AU/mL or Greater .......... Positive    Cordero antibody is very specific for systemic lupus  erythematosus (SLE) but only occurs in 30-35% of SLE  cases. The presence of antibodies to Cordero is often  associated with renal disease.  Performed by [facility],  [address] [facility],[place] [id number] [phone]  [email or web address], Virgen Casillas MD, Lab. Director     SSA (RO) Antibody IgG   Date Value Ref Range Status   06/16/2010 4  Final     Comment:     Reference range: 0 to 40  Unit: AU/mL  (Note)  REFERENCE INTERVALS: SSA (Ro) (SAMANTHA) Ab, IgG   29 AU/mL or Less ............. Negative   30 - 40 AU/mL ................ Equivocal   41 AU/mL or Greater .......... Positive    SSA (Ro) antibody is seen in 70-75% of Sjogren syndrome  cases, 30-40% of systemic lupus erythematosus (SLE) and  5-10% of progressive systemic sclerosis (PSS).  Performed by [facility],  [address] [place] [address],[place] [address]  [phone]  [email or web address], Virgen Casillas MD, Lab. Director     SSB (LA) Antibody IgG   Date Value Ref Range Status   06/16/2010 0  Final     Comment:     Reference range: 0 to 40  Unit: AU/mL  (Note)  REFERENCE INTERVALS: SSB (La) (SAMANTHA) Ab, IgG   29 AU/mL or Less ............. Negative   30 - 40 AU/mL ................ Equivocal   41 AU/mL or Greater .......... Positive    SSB (La) antibody is seen in 50-60% of Sjogren syndrome  cases and is specific if it is the only SAMANTHA antibody  present. 15-25% of patients with systemic lupus  erythematosus (SLE) and 5-10% of patients with progressive  systemic sclerosis (PSS) also have this antibody.  Performed by [facility],  [address] [place] [address] [phone]  [email or web address], Virgen Casillas MD, Lab. Director     KATIE Screen by EIA   Date Value Ref Range Status   06/16/2010 1.2 (H) 0 - 1.0 Final     Comment:     Interpretation:  Weakly Positive     DNA-ds   Date Value Ref Range Status   10/04/2018 1 <10 IU/mL Final     Comment:     Negative     Hep B Surface Agn   Date Value Ref Range Status   10/12/2011 Negative NEG Final     Albumin Fraction   Date Value Ref Range Status   12/22/2016 4.1 3.7 - 5.1 g/dL Final     Alpha 2 Fraction   Date Value Ref Range Status   12/22/2016 0.9 0.5 - 0.9 g/dL Final     Beta Fraction   Date Value Ref Range Status   12/22/2016 0.8 0.6 - 1.0 g/dL Final     Gamma Fraction   Date Value Ref Range Status   12/22/2016 0.8 0.7 - 1.6 g/dL Final     Monoclonal Peak   Date Value Ref Range Status   12/22/2016 0.0 0.0 g/dL Final     ELP Interpretation:   Date Value Ref Range Status   12/22/2016   Final    Essentially normal electrophoretic pattern.  No monoclonal protein seen.   Pathologic significance requires clinical correlation.  ISIDRO Murguia M.D.,   Ph.D., Pathologist ().       Scribe Disclosure:  I, Jeff Skinner, am serving as a scribe to document services personally performed by Deng Pruett MD at  this visit, based upon the provider's statements to me. All documentation has been reviewed by the aforementioned provider prior to being entered into the official medical record.       Again, thank you for allowing me to participate in the care of your patient.      Sincerely,    Deng Pruett MD

## 2019-12-17 NOTE — PROGRESS NOTES
[facility]  Rheumatology Clinic  Deng Pruett MD  2019     Name: Shu Duckworth  MRN: [id number]  Age: 74 year old  : 1945  Referring provider: Referred Self    Assessment and Plan:  # Systemic lupus erythematosus with secondary Sjögren's syndrome (malar rash, arthralgia, sicca symptoms, +KATIE, +dsDNA, hypocomplementemia, +Schirmer's test):  Mrs. Duckworth reports minimal symptoms other than left leg pain and swelling of several week's duration. Exam shows slightly increased circumference of the left leg 10 cm above the left ankle. There is no tenderness and Nakia's sign is negative. Blood work from 2019 showed creatinine of 1.19, stable; LFTs and CBC were normal.     Systemic lupus erythematosus is quiescent. I recommend continuing Plaquenil monotherapy 400 mg daily. While on the medication, patient should have annual ophthalmology evaluation for retinal toxicity. Most recent exam per her report was conducted in May 2019. Recheck CBC, creatinine, and urinalysis today. Continue symptomatic treatment for mild sicca and dry eye symptoms.    # L leg swelling: differential diagnosis includes muscular pain, thrombosis. Plan to obtain doppler ultrasounds of the left lower leg to rule out thrombosis.  # Osteoarthritis, thumbs:  Patient is working with Hand services to address. No indication for immunomodulatory therapy.  # Ulnar neuropathy, L;  Symptoms reflect post-operative change, following L elbow replacement in 2018. Stable.    Return to clinic in 6 months.     Orders:  - Creatinine  - CBC with platelets differential  - AST  - ALT  - Routine UA with Micro Reflex to Culture  - US Lower Extremity Venous Duplex Left     Follow-up: Return in about 6 months (around 2020).    HPI:   Shu Duckworth is a 74 year old female with a history including systemic lupus erythematosus with secondary Sjögren's syndrome (+sicca symptoms, +Schirmer test, -minor salivary gland biopsy) who presents for follow-up.  The patient was last evaluated by Dr. Jaramillo on 08/08/2019 in follow up of lupus. Symptoms were judged reasonably controlled on Plaquenil. Plaquenil was continue at 400 mg a day.    Today, the patient reports that she has been doing well. She states that she has pain all over, most prominently in her legs. She explains that her legs will get very weak after working on crafts in her basement for prolonged periods of time. She notes that this may be due to the fact that her basement floor is made on concrete. However, she still has good strength and balance after doing 4 miles on a treadmill. She adds that her legs do not feel like they are her own when they become weak, and if she is to sit down for around 20 minutes, this weakness will improve. She reports that her leg weakness makes her gait feel unstable.     She endorses numbness and tingling in the ulnar left hand and fifth finger after a recent elbow replacement revision procedure. She states that her numbness causes her to drop objects. She reports that her surgeon is aware of the issue, but did not want to fix this emergently. She notes that she received a second opinion from a surgeon in [place]. She states that her care team decided to wait on this surgery until May, so she will watch and wait until that time.     The patient reports that she has pain in the small joints of her hands for which she normally soaks them in warm water. She denies any morning stiffness in her hands. She does state that her pain is severe enough that she takes 2 Tylenol tablets daily which does improve her pain. The patient states that she has dry mouth and dry eyes as well.    She explains that she has not had a kidney biopsy, and her lupus diagnosis took place in 2010 when her daughter was diagnosed with autoimmune pancreatitis. The patient was then started on Plaquenil and had followed with Dr. Jaramillo regularly since that time. She continues hydroxychloroquine 400 mg daily  without any issues.     Patient saw Dr. Trejo in Neurology on November 11th for evaluation of hand numbness. Impression was of left hand numbness and weakness. EMG was ordered. Results were compatible with left-sided median neuropathy (carpal tunnel syndrome); left side ulnar neuropathy was also noted.     The patient saw Dr. Shrestha in Ophthalmology in October 2018. No Plaquenil retinal toxicity was noted.     She saw Dr. Duran at [facility] in August 2019 for Plaquenil retinal toxicity OCT.      Review of Systems:   Pertinent items are noted in HPI or as below, remainder of complete ROS is negative.      No recent problems with hearing or vision.   No breathing difficulty, shortness of breath, coughing, or wheezing.  No chest pain or palpitations.  No heart burn, indigestion, abdominal pain, nausea, vomiting, diarrhea.  No urination problems, no bloody, cloudy urine, no dysuria.  No headaches or confusion.  No rashes. No easy bleeding or bruising.     Active Medications:   Current Outpatient Medications:      Acetaminophen (TYLENOL PO), Take 1,000 mg by mouth every 6 hours as needed for mild pain or fever, Disp: , Rfl:      amphetamine-dextroamphetamine (ADDERALL) 20 MG per tablet, Take 10 mg by mouth 2 times daily, Disp: , Rfl:      atenolol (TENORMIN) 25 MG tablet, 2 tablets daily. One in the morning, one at bedtime., Disp: , Rfl:      Cholecalciferol (VITAMIN D3) 1000 units CAPS, Take 1,000 Units by mouth daily, Disp: , Rfl:      ClonAZEPAM (KLONOPIN) 0.5 MG tablet, Take 1 tab during day and 2 at bedtime as needed (max of 3 tabs per 24 hours), Disp: , Rfl:      famotidine (PEPCID) 20 MG tablet, Take 20 mg by mouth, Disp: , Rfl:      fluticasone (FLONASE) 50 MCG/ACT nasal spray, SPRAY 1 SPRAY INTO EACH NOSTRIL EVERY DAY, Disp: , Rfl: 33     hydrochlorothiazide (HYDRODIURIL) 50 MG tablet, Take 25 mg by mouth daily., Disp: , Rfl:      hydroxychloroquine (PLAQUENIL) 200 MG tablet, Take 1 tablet (200  mg) by mouth 2 times daily, Disp: 180 tablet, Rfl: 3     levothyroxine (SYNTHROID/LEVOTHROID) 100 MCG tablet, Take 100 mcg by mouth daily, Disp: , Rfl: 3     metoprolol succinate (TOPROL-XL) 50 MG 24 hr tablet, Take 50 mg by mouth daily, Disp: , Rfl:      mometasone (NASONEX) 50 MCG/ACT nasal spray, 1 spray in each nostril once daily, Disp: , Rfl:      Multiple Vitamins-Minerals (CENTRUM SILVER PO), Take  by mouth., Disp: , Rfl:      pantoprazole (PROTONIX) 40 MG enteric coated tablet, Take 1 tablet by mouth 2 times daily., Disp: , Rfl:      potassium chloride ER (K-DUR/KLOR-CON M) 20 MEQ CR tablet, TAKE 1 TABLET BY MOUTH EVERY DAY WITH FOOD, Disp: , Rfl: 1     ranitidine (ZANTAC) 150 MG tablet, Take one at bedtime daily, Disp: , Rfl:      rOPINIRole (REQUIP) 1 MG tablet, Take 1 mg in afternoon and 5 mg at bedtime, Disp: , Rfl:      rOPINIRole (REQUIP) 5 MG tablet, TAKE 1 TABLET BY MOUTH EVERY DAY IN THE EVENING, Disp: , Rfl: 3     sertraline (ZOLOFT) 100 MG tablet, Take 150 mg by mouth every morning, Disp: , Rfl:      simvastatin (ZOCOR) 20 MG tablet, Take one tab daily, Disp: , Rfl:      traMADol (ULTRAM) 50 MG tablet, Take 50 mg by mouth every 6 hours as needed for pain, Disp: , Rfl:      triamcinolone (KENALOG) 0.1 % external cream, APPLY A THIN LAYER TO THE AFFECTED AREA(S) BY TOPICAL ROUTE 3 TIMES PER DAY, Disp: , Rfl: 0     cyclobenzaprine (FLEXERIL) 10 MG tablet, Take 1 tablet 3 times daily as needed, Disp: , Rfl:     Allergies:  Dust Mite Extract   Penicillins   Sulfa Drugs     Past Medical History:  Encounter for long-term current use of medication  Lymphocytic colitis   Renal insufficiency   Hypokalemia   Diastolic dysfunction   Tobacco use disorder   Shortness of breath   Sjögren's syndrome   Vitamin D deficiency   Systemic lupus erythematosus   Migraines   Overweight  Primary osteoarthritis   Esophageal reflux   Anemia, unspecified   Attention deficit disorder without mention of  hyperactivity   Generalized anxiety disorder   Episodic Mood Disorder   Hypothyroid   Hyperlipidemia    Allergic rhinitis seasonal    Hypertension     Past Surgical History:  Hysterectomy 1/1/1978 - 12/31/1978  (IA) [place] Bone Graft Tib Fib FX W BMP 1/1/1992 - 12/31/1992    Bunionectomy, right   Knee replacement, left 1/1/2006 - 12/31/2006      Thumb(s) surgery  Knee replacement, right 6/1/2008 - 6/30/2008  Upper arm/elbow surgery 12/1/2008 - 12/31/2008       Colonoscopy screening 1/27/11   Bladder suspension 10/1/2005 - 10/31/2005       Colon surgery 6/1/2018 - 6/30/2018      Family History:    The patient's family history includes Sjogren's in her daughter; lupus (with more skin involvement) in her sister.    Social History:  The patient reports that she has quit smoking. She smoked 0.50 packs per day. She has never used smokeless tobacco. She reports current alcohol use. She reports that she does not use drugs. She is a retired .  PCP: Johnson Arnold MD  Marital Status:      Physical Exam:   /75 (BP Location: Right arm, Patient Position: Sitting, Cuff Size: Adult Regular)   Pulse 86   Temp 98.1  F (36.7  C)   Wt 90.7 kg (200 lb)   SpO2 100%   BMI 34.33 kg/m     Wt Readings from Last 4 Encounters:   12/17/19 90.7 kg (200 lb)   08/08/19 96 kg (211 lb 11.2 oz)   10/04/18 96.6 kg (212 lb 14.4 oz)   12/22/16 93.9 kg (207 lb)     Constitutional: Well-developed, appearing stated age; cooperative.  Eyes: Normal EOM, PERRLA, vision, conjunctiva, sclera.  ENT: Normal external ears, nose, hearing, lips, teeth, gums, throat. No mucous membrane lesions. Moderately reduced anterior saliva flow.  Neck: No mass or thyroid enlargement.  Resp: Lungs clear to auscultation, nl to palpation.  CV: RRR, no murmurs, rubs or gallops, no edema.  GI: No ABD mass or tenderness, no HSM.  : Not tested.  Lymph: No cervical, supraclavicular, inguinal or epitrochlear nodes.  MS: The TMJ, neck, shoulder, spine,  and hip joints were examined and found normal. No active synovitis or altered joint anatomy. No dactylitis, tenosynovitis, enthesopathy. On the right hand, there is bony angulation in the knuckles moreso than left hand, no visible swelling of the soft tissues. Good fist formation. Left elbow does not fully extend. No evidence of inflammatory changes in the joints of the feet/toes. Left leg above the ankle seems slightly wider in circumference than the right and is also slightly erythematous. No palpable cords or tenderness in the left leg.  Skin: No nail pitting, alopecia, rash, nodules or lesions.  Neuro: Normal cranial nerves, strength, sensation, DTRs.   Psych: Normal judgement, orientation, memory, affect.     Imaging:    Laboratory:   RHEUM RESULTS Latest Ref Rng & Units 12/22/2016 10/4/2018 12/17/2019   COMPLEMENT C3 76 - 169 mg/dL 104 97 -   COMPLEMENT C4 15 - 50 mg/dL 9(L) 8(L) -   DNA-DS <10 IU/mL 1 1 -   SED RATE 0 - 30 mm/h - - -   CRP, INFLAMMATION 0.0 - 8.0 mg/L 3.3 - -   CK TOTAL 30 - 225 U/L - - -   RHEUMATOID FACTOR 0 - 14 IU/mL - - -   KATIE SCREEN BY EIA 0 - 1.0 - - -   AST 0 - 45 U/L 25 - -   ALT 0 - 50 U/L 27 - -   ALBUMIN 3.3 - 4.9 g/dL - - -   WBC 4.0 - 11.0 10e9/L 7.3 - 5.9   RBC 3.8 - 5.2 10e12/L 4.45 - 4.28   HGB 11.7 - 15.7 g/dL 13.6 - 12.9   HCT 35.0 - 47.0 % 42.1 - 41.2   MCV 78 - 100 fl 95 - 96   MCHC 31.5 - 36.5 g/dL 32.3 - 31.3(L)   RDW 10.0 - 15.0 % 13.2 - 14.3    - 450 10e9/L 219 - 192   CREATININE 0.52 - 1.04 mg/dL 1.31(H) - -   GFR ESTIMATE, IF BLACK >60 mL/min/1.7m2 49(L) - -   GFR ESTIMATE >60 mL/min/1.7m2 40(L) - -   HEPATITIS C ANTIBODY NEG - - -     Rheumatoid Factor   Date Value Ref Range Status   06/16/2010 10 0 - 14 IU/mL Final     Ribonucleic Protein IgG Antibody   Date Value Ref Range Status   06/16/2010 0  Final     Comment:     Reference range: 0 to 40  Unit: AU/mL  (Note)  REFERENCE INTERVAL: Ribonucleic Protein (SAMANTHA), IgG   29 AU/mL or Less .............  Negative   30 - 40 AU/mL ................ Equivocal   41 AU/mL or Greater .......... Positive    RNP antibody is seen in % of mixed connective tissue  disease and is considered specific for this syndrome if  other antibodies are negative. RNP is also present in  20-30% of systemic lupus erythematosus (SLE) and 15-25%  of progressive systemic sclerosis (PSS).  Performed by [facility],  [address] [place] [address] [phone]  www.[employer], Virgen Casillas MD, Lab. Director     Cordero Antibody IgG   Date Value Ref Range Status   06/16/2010 0  Final     Comment:     Reference range: 0 to 40  Unit: AU/mL  (Note)  REFERENCE INTERVALS: SMITH (SAMANTHA) Ab, IgG   29 AU/mL or Less ............. Negative   30 - 40 AU/mL ................ Equivocal   41 AU/mL or Greater .......... Positive    Cordero antibody is very specific for systemic lupus  erythematosus (SLE) but only occurs in 30-35% of SLE  cases. The presence of antibodies to Cordero is often  associated with renal disease.  Performed by [facility],  [address] [facility], [place] [address] [phone]  www.[employer], Virgen Casillas MD, Lab. Director     SSA (RO) Antibody IgG   Date Value Ref Range Status   06/16/2010 4  Final     Comment:     Reference range: 0 to 40  Unit: AU/mL  (Note)  REFERENCE INTERVALS: SSA (Ro) (SAMANTHA) Ab, IgG   29 AU/mL or Less ............. Negative   30 - 40 AU/mL ................ Equivocal   41 AU/mL or Greater .......... Positive    SSA (Ro) antibody is seen in 70-75% of Sjogren syndrome  cases, 30-40% of systemic lupus erythematosus (SLE) and  5-10% of progressive systemic sclerosis (PSS).  Performed by [facility],  [address] [facility], [place] [address] [phone]  www.[employer], Virgen Casillas MD, Lab. Director     SSB (LA) Antibody IgG   Date Value Ref Range Status   06/16/2010 0  Final     Comment:     Reference range: 0 to 40  Unit: AU/mL  (Note)  REFERENCE INTERVALS: SSB (La) (SAMANTHA) Ab, IgG   29 AU/mL or Less  ............. Negative   30 - 40 AU/mL ................ Equivocal   41 AU/mL or Greater .......... Positive    SSB (La) antibody is seen in 50-60% of Sjogren syndrome  cases and is specific if it is the only SAMANTHA antibody  present. 15-25% of patients with systemic lupus  erythematosus (SLE) and 5-10% of patients with progressive  systemic sclerosis (PSS) also have this antibody.  Performed by [facility],  [address] [place] [id number] [phone]  [email or web address], Virgen Casillas MD, Lab. Director     KATIE Screen by EIA   Date Value Ref Range Status   06/16/2010 1.2 (H) 0 - 1.0 Final     Comment:     Interpretation:  Weakly Positive     DNA-ds   Date Value Ref Range Status   10/04/2018 1 <10 IU/mL Final     Comment:     Negative     Hep B Surface Agn   Date Value Ref Range Status   10/12/2011 Negative NEG Final     Albumin Fraction   Date Value Ref Range Status   12/22/2016 4.1 3.7 - 5.1 g/dL Final     Alpha 2 Fraction   Date Value Ref Range Status   12/22/2016 0.9 0.5 - 0.9 g/dL Final     Beta Fraction   Date Value Ref Range Status   12/22/2016 0.8 0.6 - 1.0 g/dL Final     Gamma Fraction   Date Value Ref Range Status   12/22/2016 0.8 0.7 - 1.6 g/dL Final     Monoclonal Peak   Date Value Ref Range Status   12/22/2016 0.0 0.0 g/dL Final     ELP Interpretation:   Date Value Ref Range Status   12/22/2016   Final    Essentially normal electrophoretic pattern.  No monoclonal protein seen.   Pathologic significance requires clinical correlation.  ISIDRO Murguia M.D.,   Ph.D., Pathologist ().       Scribe Disclosure:  IJeff, am serving as a scribe to document services personally performed by Deng Pruett MD at this visit, based upon the provider's statements to me. All documentation has been reviewed by the aforementioned provider prior to being entered into the official medical record.

## 2019-12-18 LAB
BACTERIA SPEC CULT: ABNORMAL
BACTERIA SPEC CULT: ABNORMAL
Lab: ABNORMAL
SPECIMEN SOURCE: ABNORMAL

## 2020-03-15 ENCOUNTER — HEALTH MAINTENANCE LETTER (OUTPATIENT)
Age: 75
End: 2020-03-15

## 2020-06-15 NOTE — PROGRESS NOTES
[facility]  Rheumatology Clinic  Deng Pruett MD  06/15/2020     Name: Shu Duckworth  MRN: [id number]  Age: 74 year old  : 1945  Referring provider: Referred Self    Assessment and Plan:  # Systemic lupus erythematosus with secondary Sjögren's syndrome (malar rash, arthralgia, sicca symptoms, +KATIE, +dsDNA, hypocomplementemia, +Schirmer's test):  Mrs. Duckworth reports ongoing diffuse joint pain, low-grade in character, and improved with activity.  She reports maintaining of vigorous physical activity schedule with daily gardening despite the pain.  Video examination today shows normal fist formation and no gross swelling in knuckles or fingers.    Laboratory evaluation in 2019 revealed normal, CBC, transaminases, and Creatinine was 1.19, and urinalysis showed 11 white cells per high-powered field with a positive leukocyte esterase. lower extremity Doppler ultrasound was normal.    Systemic lupus erythematosus is quiescent. I recommend continuing Plaquenil monotherapy 400 mg daily. While on the medication, patient should have annual ophthalmology evaluation for retinal toxicity. Most recent exam per her report was conducted in May 2019; she is scheduled again in . Plan:    Plan:  1.  Continue Plaquenil 400 mg daily.  While on Plaquenil, undergo annual ophthalmology evaluation for rare retinal toxicity.  2.  Repeat urinalysis, complete blood count, and creatinine at your convenience.  3.  Continue acetaminophen 1000 mg to 2000 mg daily for residual joint pain.  4.  Continue aerobic exercise daily to maintain joint and muscle health.  5. Continue symptomatic treatment for mild sicca and dry eye symptoms.      # L leg swelling: No further symptoms; doppler ultrasounds of the left lower leg to rule out thrombosis in  were negative.  # Osteoarthritis, thumbs:  No indication for immunomodulatory therapy.  # Ulnar neuropathy, L;  Symptoms reflect post-operative change, following L elbow  replacement in 2018. Stable.    Return to clinic in 6 months.     Orders:  - Creatinine  - CBC with platelets differential  - AST  - ALT  - Routine UA with Micro Reflex to Culture  - US Lower Extremity Venous Duplex Left     Follow-up: Return in about 6 months (around 6/17/2020).    HPI:   Shu Duckworth is a 74 year old female with a history including systemic lupus erythematosus with secondary Sjögren's syndrome (+sicca symptoms, +Schirmer test, -minor salivary gland biopsy) who presents for follow-up. The patient was last evaluated in  in follow up of lupus. Symptoms were overall judged reasonably controlled on Plaquenil. Plaquenil was continued at 400 mg a day.    Interval history 06-:    She continues with diffuse pain; the sides of her feet, muscles, and joints.   She notes swelling in her R hand, constant, not affected by activity.     She has been gardening frequently; she is able to grasp and pull weeds. She opens jars fine.  She takes acetaminopehn 500 mg 2-4 tabs per day. +significant relief from joint pain, temporary.    She notes some balance/insteadiness with getting up from a chair, or with rapid turning. No vertigo; minimal dizziness normally.    Prior history:  :  Today, the patient reports that she has been doing well. She states that she has pain all over, most prominently in her legs. She explains that her legs will get very weak after working on crafts in her basement for prolonged periods of time. She notes that this may be due to the fact that her basement floor is made on concrete. However, she still has good strength and balance after doing 4 miles on a treadmill. She adds that her legs do not feel like they are her own when they become weak, and if she is to sit down for around 20 minutes, this weakness will improve. She reports that her leg weakness makes her gait feel unstable.     She endorses numbness and tingling in the ulnar left hand and fifth finger after a  "recent elbow replacement revision procedure. She states that her numbness causes her to drop objects. She reports that her surgeon is aware of the issue, but did not want to fix this emergently. She notes that she received a second opinion from a surgeon in [place]. She states that her care team decided to wait on this surgery until May, so she will watch and wait until that time.     The patient reports that she has pain in the small joints of her hands for which she normally soaks them in warm water. She denies any morning stiffness in her hands. She does state that her pain is severe enough that she takes 2 Tylenol tablets daily which does improve her pain. The patient states that she has dry mouth and dry eyes as well.    She explains that she has not had a kidney biopsy, and her lupus diagnosis took place in 2010 when her daughter was diagnosed with autoimmune pancreatitis. The patient was then started on Plaquenil and had followed with Dr. Jaramillo regularly since that time. She continues hydroxychloroquine 400 mg daily without any issues.     Patient saw Dr. Trejo in Neurology on November 11th for evaluation of hand numbness. Impression was of left hand numbness and weakness. EMG was ordered. Results were compatible with left-sided median neuropathy (carpal tunnel syndrome); left side ulnar neuropathy was also noted.     The patient saw Dr. Shrestha in Ophthalmology in October 2018. No Plaquenil retinal toxicity was noted.     She saw Dr. Duran at [facility] in August 2019 for Plaquenil retinal toxicity OCT.      Review of Systems:   Pertinent items are noted in HPI or as below, remainder of complete ROS is negative.    +ongoing dry mouth; +\"film\" over my eyes    No recent problems with hearing or vision.   + chronic cough, alleviated with cough medicine/drops  No chest pain or palpitations.  No heart burn, indigestion, abdominal pain, nausea, vomiting, diarrhea.  No urination problems, no bloody, " cloudy urine, no dysuria.  GI: frequent BMs, getting more frequent, sometimes 6 BMs in short order. +peptobismal helps +microscopic colitis.    No headaches or confusion.  No rashes; occasional itching on her legs.    Active Medications:   Current Outpatient Medications   Medication     Acetaminophen (TYLENOL PO)     amphetamine-dextroamphetamine (ADDERALL) 20 MG per tablet     Cholecalciferol (VITAMIN D3) 1000 units CAPS     ClonAZEPAM (KLONOPIN) 0.5 MG tablet     fluticasone (FLONASE) 50 MCG/ACT nasal spray     hydrochlorothiazide (HYDRODIURIL) 50 MG tablet     hydroxychloroquine (PLAQUENIL) 200 MG tablet     levothyroxine (SYNTHROID/LEVOTHROID) 100 MCG tablet     metoprolol succinate (TOPROL-XL) 50 MG 24 hr tablet     Multiple Vitamins-Minerals (CENTRUM SILVER PO)     pantoprazole (PROTONIX) 40 MG enteric coated tablet     potassium chloride ER (K-DUR/KLOR-CON M) 20 MEQ CR tablet     rOPINIRole (REQUIP) 1 MG tablet     rOPINIRole (REQUIP) 5 MG tablet     sertraline (ZOLOFT) 100 MG tablet     simvastatin (ZOCOR) 20 MG tablet     traMADol (ULTRAM) 50 MG tablet     triamcinolone (KENALOG) 0.1 % external cream     atenolol (TENORMIN) 25 MG tablet     cyclobenzaprine (FLEXERIL) 10 MG tablet     famotidine (PEPCID) 20 MG tablet     mometasone (NASONEX) 50 MCG/ACT nasal spray     ranitidine (ZANTAC) 150 MG tablet     No current facility-administered medications for this visit.        Allergies:  Dust Mite Extract   Penicillins   Sulfa Drugs     Past Medical History:  Encounter for long-term current use of medication  Lymphocytic colitis   Renal insufficiency   Hypokalemia   Diastolic dysfunction   Tobacco use disorder   Shortness of breath   Sjögren's syndrome   Vitamin D deficiency   Systemic lupus erythematosus   Migraines   Overweight  Primary osteoarthritis   Esophageal reflux   Anemia, unspecified   Attention deficit disorder without mention of hyperactivity   Generalized anxiety disorder   Episodic Mood  Disorder   Hypothyroid   Hyperlipidemia    Allergic rhinitis seasonal    Hypertension     Past Surgical History:  Hysterectomy 1/1/1978 - 12/31/1978  (IA) NC Bone Graft Tib Fib FX W BMP 1/1/1992 - 12/31/1992    Bunionectomy, right   Knee replacement, left 1/1/2006 - 12/31/2006      Thumb(s) surgery  Knee replacement, right 6/1/2008 - 6/30/2008  Upper arm/elbow surgery 12/1/2008 - 12/31/2008       Colonoscopy screening 1/27/11   Bladder suspension 10/1/2005 - 10/31/2005       Colon surgery 6/1/2018 - 6/30/2018      Family History:    The patient's family history includes Sjogren's in her daughter; lupus (with more skin involvement) in her sister.    Social History:  The patient reports that she has quit smoking. She smoked 0.50 packs per day. She has never used smokeless tobacco. She reports current alcohol use. She reports that she does not use drugs. She is a retired .  PCP: Johnson Arnold MD  Marital Status:      Physical Exam:   There were no vitals taken for this visit.   Wt Readings from Last 4 Encounters:   12/17/19 90.7 kg (200 lb)   08/08/19 96 kg (211 lb 11.2 oz)   10/04/18 96.6 kg (212 lb 14.4 oz)   12/22/16 93.9 kg (207 lb)     Constitutional: Well-developed, appearing stated age; cooperative.  Eyes: Normal EOM, PERRLA, vision, conjunctiva, sclera.  ENT: Normal external ears, nose, hearing, lips, teeth, gums  Neck: No mass or thyroid enlargement.  MS:  On the right hand, there is bony angulation in the knuckles moreso than left hand, no visible swelling of the soft tissues. Good fist formation. Left elbow does not fully extend.   Skin: No nail pitting, alopecia, rash, nodules or lesions.  Neuro: Normal cranial nerves  Psych: Normal judgement, orientation, memory, affect.     Imaging:    Laboratory:   RHEUM RESULTS Latest Ref Rng & Units 12/22/2016 10/4/2018 12/17/2019   COMPLEMENT C3 76 - 169 mg/dL 104 97 -   COMPLEMENT C4 15 - 50 mg/dL 9(L) 8(L) -   DNA-DS <10 IU/mL 1 1 -   SED  RATE 0 - 30 mm/h - - -   CRP, INFLAMMATION 0.0 - 8.0 mg/L 3.3 - -   CK TOTAL 30 - 225 U/L - - -   RHEUMATOID FACTOR 0 - 14 IU/mL - - -   KATIE SCREEN BY EIA 0 - 1.0 - - -   AST 0 - 45 U/L 25 - 22   ALT 0 - 50 U/L 27 - 26   ALBUMIN 3.3 - 4.9 g/dL - - -   WBC 4.0 - 11.0 10e9/L 7.3 - 5.9   RBC 3.8 - 5.2 10e12/L 4.45 - 4.28   HGB 11.7 - 15.7 g/dL 13.6 - 12.9   HCT 35.0 - 47.0 % 42.1 - 41.2   MCV 78 - 100 fl 95 - 96   MCHC 31.5 - 36.5 g/dL 32.3 - 31.3(L)   RDW 10.0 - 15.0 % 13.2 - 14.3    - 450 10e9/L 219 - 192   CREATININE 0.52 - 1.04 mg/dL 1.31(H) - 1.19(H)   GFR ESTIMATE, IF BLACK >60 mL/min/[1.73:m2] 49(L) - 52(L)   GFR ESTIMATE >60 mL/min/[1.73:m2] 40(L) - 45(L)   HEPATITIS C ANTIBODY NEG - - -     Rheumatoid Factor   Date Value Ref Range Status   06/16/2010 10 0 - 14 IU/mL Final     Ribonucleic Protein IgG Antibody   Date Value Ref Range Status   06/16/2010 0  Final     Comment:     Reference range: 0 to 40  Unit: AU/mL  (Note)  REFERENCE INTERVAL: Ribonucleic Protein (SAMANTHA), IgG   29 AU/mL or Less ............. Negative   30 - 40 AU/mL ................ Equivocal   41 AU/mL or Greater .......... Positive    RNP antibody is seen in % of mixed connective tissue  disease and is considered specific for this syndrome if  other antibodies are negative. RNP is also present in  20-30% of systemic lupus erythematosus (SLE) and 15-25%  of progressive systemic sclerosis (PSS).  Performed by [facility],  [address], [place] [address] [phone]  [email or web address], Virgen Casillas MD, Lab. Director     Cordero Antibody IgG   Date Value Ref Range Status   06/16/2010 0  Final     Comment:     Reference range: 0 to 40  Unit: AU/mL  (Note)  REFERENCE INTERVALS: SMITH (SAMANTHA) Ab, IgG   29 AU/mL or Less ............. Negative   30 - 40 AU/mL ................ Equivocal   41 AU/mL or Greater .......... Positive    Cordero antibody is very specific for systemic lupus  erythematosus (SLE) but only occurs in 30-35% of  SLE  cases. The presence of antibodies to Cordero is often  associated with renal disease.  Performed by [facility],  [address] [facility],[place] [id number] [phone]  [email or web address], Virgen Casillas MD, Lab. Director     SSA (RO) Antibody IgG   Date Value Ref Range Status   06/16/2010 4  Final     Comment:     Reference range: 0 to 40  Unit: AU/mL  (Note)  REFERENCE INTERVALS: SSA (Ro) (SAMANTHA) Ab, IgG   29 AU/mL or Less ............. Negative   30 - 40 AU/mL ................ Equivocal   41 AU/mL or Greater .......... Positive    SSA (Ro) antibody is seen in 70-75% of Sjogren syndrome  cases, 30-40% of systemic lupus erythematosus (SLE) and  5-10% of progressive systemic sclerosis (PSS).  Performed by [facility],  [address] [facility],[place] [id number] [phone]  [email or web address], Virgen Casillas MD, Lab. Director     SSB (LA) Antibody IgG   Date Value Ref Range Status   06/16/2010 0  Final     Comment:     Reference range: 0 to 40  Unit: AU/mL  (Note)  REFERENCE INTERVALS: SSB (La) (SAMANTHA) Ab, IgG   29 AU/mL or Less ............. Negative   30 - 40 AU/mL ................ Equivocal   41 AU/mL or Greater .......... Positive    SSB (La) antibody is seen in 50-60% of Sjogren syndrome  cases and is specific if it is the only SAMANTHA antibody  present. 15-25% of patients with systemic lupus  erythematosus (SLE) and 5-10% of patients with progressive  systemic sclerosis (PSS) also have this antibody.  Performed by [facility],  [address] [facility],[place] [id number] [phone]  [email or web address], Virgen Casillas MD, Lab. Director     KATIE Screen by EIA   Date Value Ref Range Status   06/16/2010 1.2 (H) 0 - 1.0 Final     Comment:     Interpretation:  Weakly Positive     DNA-ds   Date Value Ref Range Status   10/04/2018 1 <10 IU/mL Final     Comment:     Negative     Hep B Surface Agn   Date Value Ref Range Status   10/12/2011 Negative NEG Final     Albumin Fraction   Date Value Ref Range Status   12/22/2016 4.1 3.7 - 5.1  "g/dL Final     Alpha 2 Fraction   Date Value Ref Range Status   12/22/2016 0.9 0.5 - 0.9 g/dL Final     Beta Fraction   Date Value Ref Range Status   12/22/2016 0.8 0.6 - 1.0 g/dL Final     Gamma Fraction   Date Value Ref Range Status   12/22/2016 0.8 0.7 - 1.6 g/dL Final     Monoclonal Peak   Date Value Ref Range Status   12/22/2016 0.0 0.0 g/dL Final     ELP Interpretation:   Date Value Ref Range Status   12/22/2016   Final    Essentially normal electrophoretic pattern.  No monoclonal protein seen.   Pathologic significance requires clinical correlation.  ISIDRO Murguia M.D.,   Ph.D., Pathologist ().       Shu Duckworth is a 74 year old female who is being evaluated via a billable video visit.      The patient has been notified of following:     \"This video visit will be conducted via a call between you and your physician/provider. We have found that certain health care needs can be provided without the need for an in-person physical exam.  This service lets us provide the care you need with a video conversation.  If a prescription is necessary we can send it directly to your pharmacy.  If lab work is needed we can place an order for that and you can then stop by our lab to have the test done at a later time.    Video visits are billed at different rates depending on your insurance coverage.  Please reach out to your insurance provider with any questions.    If during the course of the call the physician/provider feels a video visit is not appropriate, you will not be charged for this service.\"    Patient has given verbal consent for Video visit? Yes    Will anyone else be joining your video visit? No      Subjective     Shu Duckworth is a 74 year old female who is being evaluated via a billable video visit.      The patient has been notified of following:     \"This video visit will be conducted via a call between you and your physician/provider. We have found that certain health care needs can be " "provided without the need for an in-person physical exam.  This service lets us provide the care you need with a video conversation.  If a prescription is necessary we can send it directly to your pharmacy.  If lab work is needed we can place an order for that and you can then stop by our lab to have the test done at a later time.    Video visits are billed at different rates depending on your insurance coverage.  Please reach out to your insurance provider with any questions.    If during the course of the call the physician/provider feels a video visit is not appropriate, you will not be charged for this service.\"    Patient has given verbal consent for Video visit? Yes    Will anyone else be joining your video visit? No        Video-Visit Details    Type of service:  Video Visit    Video Start Time: 9:14 AM  Video End Time: 10:10 AM    Originating Location (pt. Location): Home    Distant Location (provider location):  [facility] RHEUMATOLOGY     Platform used for Video Visit: Dennis Pruett MD          "

## 2020-06-16 ENCOUNTER — VIRTUAL VISIT (OUTPATIENT)
Dept: RHEUMATOLOGY | Facility: CLINIC | Age: 75
End: 2020-06-16
Attending: INTERNAL MEDICINE
Payer: COMMERCIAL

## 2020-06-16 DIAGNOSIS — M32.9 SYSTEMIC LUPUS ERYTHEMATOSUS, UNSPECIFIED SLE TYPE, UNSPECIFIED ORGAN INVOLVEMENT STATUS (H): Primary | ICD-10-CM

## 2020-06-16 ASSESSMENT — PAIN SCALES - GENERAL: PAINLEVEL: MODERATE PAIN (5)

## 2020-06-16 NOTE — LETTER
2020       RE: Shu Duckworth  [address] [facility] [address]     Dear Colleague,    Thank you for referring your patient, Shu Duckworth, to the [facility] RHEUMATOLOGY at [facility]. Please see a copy of my visit note below.    [facility]  Rheumatology Clinic  Deng Pruett MD  06/15/2020     Name: Shu Duckworth  MRN: [id number]  Age: 74 year old  : 1945  Referring provider: Referred Self    Assessment and Plan:  # Systemic lupus erythematosus with secondary Sjögren's syndrome (malar rash, arthralgia, sicca symptoms, +KAITE, +dsDNA, hypocomplementemia, +Schirmer's test):  Mrs. Duckworth reports ongoing diffuse joint pain, low-grade in character, and improved with activity.  She reports maintaining of vigorous physical activity schedule with daily gardening despite the pain.  Video examination today shows normal fist formation and no gross swelling in knuckles or fingers.    Laboratory evaluation in 2019 revealed normal, CBC, transaminases, and Creatinine was 1.19, and urinalysis showed 11 white cells per high-powered field with a positive leukocyte esterase. lower extremity Doppler ultrasound was normal.    Systemic lupus erythematosus is quiescent. I recommend continuing Plaquenil monotherapy 400 mg daily. While on the medication, patient should have annual ophthalmology evaluation for retinal toxicity. Most recent exam per her report was conducted in May 2019; she is scheduled again in . Plan:    Plan:  1.  Continue Plaquenil 400 mg daily.  While on Plaquenil, undergo annual ophthalmology evaluation for rare retinal toxicity.  2.  Repeat urinalysis, complete blood count, and creatinine at your convenience.  3.  Continue acetaminophen 1000 mg to 2000 mg daily for residual joint pain.  4.  Continue aerobic exercise daily to maintain joint and muscle health.  5. Continue symptomatic treatment for mild sicca and dry eye symptoms.      # L leg  swelling: No further symptoms; doppler ultrasounds of the left lower leg to rule out thrombosis in 12-19 were negative.  # Osteoarthritis, thumbs:  No indication for immunomodulatory therapy.  # Ulnar neuropathy, L;  Symptoms reflect post-operative change, following L elbow replacement in 2018. Stable.    Return to clinic in 6 months.     Orders:  - Creatinine  - CBC with platelets differential  - AST  - ALT  - Routine UA with Micro Reflex to Culture  - US Lower Extremity Venous Duplex Left     Follow-up: Return in about 6 months (around 6/17/2020).    HPI:   Shu Duckworth is a 74 year old female with a history including systemic lupus erythematosus with secondary Sjögren's syndrome (+sicca symptoms, +Schirmer test, -minor salivary gland biopsy) who presents for follow-up. The patient was last evaluated in  in follow up of lupus. Symptoms were overall judged reasonably controlled on Plaquenil. Plaquenil was continued at 400 mg a day.    Interval history 06-:    She continues with diffuse pain; the sides of her feet, muscles, and joints.   She notes swelling in her R hand, constant, not affected by activity.     She has been gardening frequently; she is able to grasp and pull weeds. She opens jars fine.  She takes acetaminopehn 500 mg 2-4 tabs per day. +significant relief from joint pain, temporary.    She notes some balance/insteadiness with getting up from a chair, or with rapid turning. No vertigo; minimal dizziness normally.    Prior history:  :  Today, the patient reports that she has been doing well. She states that she has pain all over, most prominently in her legs. She explains that her legs will get very weak after working on crafts in her basement for prolonged periods of time. She notes that this may be due to the fact that her basement floor is made on concrete. However, she still has good strength and balance after doing 4 miles on a treadmill. She adds that her legs do not feel  like they are her own when they become weak, and if she is to sit down for around 20 minutes, this weakness will improve. She reports that her leg weakness makes her gait feel unstable.     She endorses numbness and tingling in the ulnar left hand and fifth finger after a recent elbow replacement revision procedure. She states that her numbness causes her to drop objects. She reports that her surgeon is aware of the issue, but did not want to fix this emergently. She notes that she received a second opinion from a surgeon in [place]. She states that her care team decided to wait on this surgery until May, so she will watch and wait until that time.     The patient reports that she has pain in the small joints of her hands for which she normally soaks them in warm water. She denies any morning stiffness in her hands. She does state that her pain is severe enough that she takes 2 Tylenol tablets daily which does improve her pain. The patient states that she has dry mouth and dry eyes as well.    She explains that she has not had a kidney biopsy, and her lupus diagnosis took place in 2010 when her daughter was diagnosed with autoimmune pancreatitis. The patient was then started on Plaquenil and had followed with Dr. Jaramillo regularly since that time. She continues hydroxychloroquine 400 mg daily without any issues.     Patient saw Dr. Trejo in Neurology on November 11th for evaluation of hand numbness. Impression was of left hand numbness and weakness. EMG was ordered. Results were compatible with left-sided median neuropathy (carpal tunnel syndrome); left side ulnar neuropathy was also noted.     The patient saw Dr. Shrestha in Ophthalmology in October 2018. No Plaquenil retinal toxicity was noted.     She saw Dr. Duran at [facility] in August 2019 for Plaquenil retinal toxicity OCT.      Review of Systems:   Pertinent items are noted in HPI or as below, remainder of complete ROS is negative.    +ongoing  "dry mouth; +\"film\" over my eyes    No recent problems with hearing or vision.   + chronic cough, alleviated with cough medicine/drops  No chest pain or palpitations.  No heart burn, indigestion, abdominal pain, nausea, vomiting, diarrhea.  No urination problems, no bloody, cloudy urine, no dysuria.  GI: frequent BMs, getting more frequent, sometimes 6 BMs in short order. +peptobismal helps +microscopic colitis.    No headaches or confusion.  No rashes; occasional itching on her legs.    Active Medications:   Current Outpatient Medications   Medication     Acetaminophen (TYLENOL PO)     amphetamine-dextroamphetamine (ADDERALL) 20 MG per tablet     Cholecalciferol (VITAMIN D3) 1000 units CAPS     ClonAZEPAM (KLONOPIN) 0.5 MG tablet     fluticasone (FLONASE) 50 MCG/ACT nasal spray     hydrochlorothiazide (HYDRODIURIL) 50 MG tablet     hydroxychloroquine (PLAQUENIL) 200 MG tablet     levothyroxine (SYNTHROID/LEVOTHROID) 100 MCG tablet     metoprolol succinate (TOPROL-XL) 50 MG 24 hr tablet     Multiple Vitamins-Minerals (CENTRUM SILVER PO)     pantoprazole (PROTONIX) 40 MG enteric coated tablet     potassium chloride ER (K-DUR/KLOR-CON M) 20 MEQ CR tablet     rOPINIRole (REQUIP) 1 MG tablet     rOPINIRole (REQUIP) 5 MG tablet     sertraline (ZOLOFT) 100 MG tablet     simvastatin (ZOCOR) 20 MG tablet     traMADol (ULTRAM) 50 MG tablet     triamcinolone (KENALOG) 0.1 % external cream     atenolol (TENORMIN) 25 MG tablet     cyclobenzaprine (FLEXERIL) 10 MG tablet     famotidine (PEPCID) 20 MG tablet     mometasone (NASONEX) 50 MCG/ACT nasal spray     ranitidine (ZANTAC) 150 MG tablet     No current facility-administered medications for this visit.        Allergies:  Dust Mite Extract   Penicillins   Sulfa Drugs     Past Medical History:  Encounter for long-term current use of medication  Lymphocytic colitis   Renal insufficiency   Hypokalemia   Diastolic dysfunction   Tobacco use disorder   Shortness of breath   Sjögren's " syndrome   Vitamin D deficiency   Systemic lupus erythematosus   Migraines   Overweight  Primary osteoarthritis   Esophageal reflux   Anemia, unspecified   Attention deficit disorder without mention of hyperactivity   Generalized anxiety disorder   Episodic Mood Disorder   Hypothyroid   Hyperlipidemia    Allergic rhinitis seasonal    Hypertension     Past Surgical History:  Hysterectomy 1/1/1978 - 12/31/1978  (IA) CA Bone Graft Tib Fib FX W BMP 1/1/1992 - 12/31/1992    Bunionectomy, right   Knee replacement, left 1/1/2006 - 12/31/2006      Thumb(s) surgery  Knee replacement, right 6/1/2008 - 6/30/2008  Upper arm/elbow surgery 12/1/2008 - 12/31/2008       Colonoscopy screening 1/27/11   Bladder suspension 10/1/2005 - 10/31/2005       Colon surgery 6/1/2018 - 6/30/2018      Family History:    The patient's family history includes Sjogren's in her daughter; lupus (with more skin involvement) in her sister.    Social History:  The patient reports that she has quit smoking. She smoked 0.50 packs per day. She has never used smokeless tobacco. She reports current alcohol use. She reports that she does not use drugs. She is a retired .  PCP: Johnson Arnold MD  Marital Status:      Physical Exam:   There were no vitals taken for this visit.   Wt Readings from Last 4 Encounters:   12/17/19 90.7 kg (200 lb)   08/08/19 96 kg (211 lb 11.2 oz)   10/04/18 96.6 kg (212 lb 14.4 oz)   12/22/16 93.9 kg (207 lb)     Constitutional: Well-developed, appearing stated age; cooperative.  Eyes: Normal EOM, PERRLA, vision, conjunctiva, sclera.  ENT: Normal external ears, nose, hearing, lips, teeth, gums  Neck: No mass or thyroid enlargement.  MS:  On the right hand, there is bony angulation in the knuckles moreso than left hand, no visible swelling of the soft tissues. Good fist formation. Left elbow does not fully extend.   Skin: No nail pitting, alopecia, rash, nodules or lesions.  Neuro: Normal cranial  nerves  Psych: Normal judgement, orientation, memory, affect.     Imaging:    Laboratory:   RHEUM RESULTS Latest Ref Rng & Units 12/22/2016 10/4/2018 12/17/2019   COMPLEMENT C3 76 - 169 mg/dL 104 97 -   COMPLEMENT C4 15 - 50 mg/dL 9(L) 8(L) -   DNA-DS <10 IU/mL 1 1 -   SED RATE 0 - 30 mm/h - - -   CRP, INFLAMMATION 0.0 - 8.0 mg/L 3.3 - -   CK TOTAL 30 - 225 U/L - - -   RHEUMATOID FACTOR 0 - 14 IU/mL - - -   KATIE SCREEN BY EIA 0 - 1.0 - - -   AST 0 - 45 U/L 25 - 22   ALT 0 - 50 U/L 27 - 26   ALBUMIN 3.3 - 4.9 g/dL - - -   WBC 4.0 - 11.0 10e9/L 7.3 - 5.9   RBC 3.8 - 5.2 10e12/L 4.45 - 4.28   HGB 11.7 - 15.7 g/dL 13.6 - 12.9   HCT 35.0 - 47.0 % 42.1 - 41.2   MCV 78 - 100 fl 95 - 96   MCHC 31.5 - 36.5 g/dL 32.3 - 31.3(L)   RDW 10.0 - 15.0 % 13.2 - 14.3    - 450 10e9/L 219 - 192   CREATININE 0.52 - 1.04 mg/dL 1.31(H) - 1.19(H)   GFR ESTIMATE, IF BLACK >60 mL/min/[1.73:m2] 49(L) - 52(L)   GFR ESTIMATE >60 mL/min/[1.73:m2] 40(L) - 45(L)   HEPATITIS C ANTIBODY NEG - - -     Rheumatoid Factor   Date Value Ref Range Status   06/16/2010 10 0 - 14 IU/mL Final     Ribonucleic Protein IgG Antibody   Date Value Ref Range Status   06/16/2010 0  Final     Comment:     Reference range: 0 to 40  Unit: AU/mL  (Note)  REFERENCE INTERVAL: Ribonucleic Protein (SAMANTHA), IgG   29 AU/mL or Less ............. Negative   30 - 40 AU/mL ................ Equivocal   41 AU/mL or Greater .......... Positive    RNP antibody is seen in % of mixed connective tissue  disease and is considered specific for this syndrome if  other antibodies are negative. RNP is also present in  20-30% of systemic lupus erythematosus (SLE) and 15-25%  of progressive systemic sclerosis (PSS).  Performed by [facility],  [address] [place] [address],[place] [address] [phone]  [email or web address], Virgen Casillas MD, Lab. Director     Cordero Antibody IgG   Date Value Ref Range Status   06/16/2010 0  Final     Comment:     Reference range: 0 to 40  Unit:  AU/mL  (Note)  REFERENCE INTERVALS: SMITH (SAMANTHA) Ab, IgG   29 AU/mL or Less ............. Negative   30 - 40 AU/mL ................ Equivocal   41 AU/mL or Greater .......... Positive    Cordero antibody is very specific for systemic lupus  erythematosus (SLE) but only occurs in 30-35% of SLE  cases. The presence of antibodies to Cordero is often  associated with renal disease.  Performed by [facility],  [address],[place] [id number] [phone]  [email or web address], Virgen Casillas MD, Lab. Director     SSA (RO) Antibody IgG   Date Value Ref Range Status   06/16/2010 4  Final     Comment:     Reference range: 0 to 40  Unit: AU/mL  (Note)  REFERENCE INTERVALS: SSA (Ro) (SAMANTHA) Ab, IgG   29 AU/mL or Less ............. Negative   30 - 40 AU/mL ................ Equivocal   41 AU/mL or Greater .......... Positive    SSA (Ro) antibody is seen in 70-75% of Sjogren syndrome  cases, 30-40% of systemic lupus erythematosus (SLE) and  5-10% of progressive systemic sclerosis (PSS).  Performed by [facility],  [address] [place], [place] [id number] [phone]  [email or web address], Virgen Casillas MD, Lab. Director     SSB (LA) Antibody IgG   Date Value Ref Range Status   06/16/2010 0  Final     Comment:     Reference range: 0 to 40  Unit: AU/mL  (Note)  REFERENCE INTERVALS: SSB (La) (SAMANTHA) Ab, IgG   29 AU/mL or Less ............. Negative   30 - 40 AU/mL ................ Equivocal   41 AU/mL or Greater .......... Positive    SSB (La) antibody is seen in 50-60% of Sjogren syndrome  cases and is specific if it is the only SAMANTHA antibody  present. 15-25% of patients with systemic lupus  erythematosus (SLE) and 5-10% of patients with progressive  systemic sclerosis (PSS) also have this antibody.  Performed by [facility],  [address] [address] [phone]  [email or web address], Virgen Casillas MD, Lab. Director     KATIE Screen by EIA   Date Value Ref Range Status   06/16/2010 1.2 (H) 0 - 1.0 Final     Comment:      "Interpretation:  Weakly Positive     DNA-ds   Date Value Ref Range Status   10/04/2018 1 <10 IU/mL Final     Comment:     Negative     Hep B Surface Agn   Date Value Ref Range Status   10/12/2011 Negative NEG Final     Albumin Fraction   Date Value Ref Range Status   12/22/2016 4.1 3.7 - 5.1 g/dL Final     Alpha 2 Fraction   Date Value Ref Range Status   12/22/2016 0.9 0.5 - 0.9 g/dL Final     Beta Fraction   Date Value Ref Range Status   12/22/2016 0.8 0.6 - 1.0 g/dL Final     Gamma Fraction   Date Value Ref Range Status   12/22/2016 0.8 0.7 - 1.6 g/dL Final     Monoclonal Peak   Date Value Ref Range Status   12/22/2016 0.0 0.0 g/dL Final     ELP Interpretation:   Date Value Ref Range Status   12/22/2016   Final    Essentially normal electrophoretic pattern.  No monoclonal protein seen.   Pathologic significance requires clinical correlation.  ISIDRO Murguia M.D.,   Ph.D., Pathologist ().       Shu Duckworth is a 74 year old female who is being evaluated via a billable video visit.      The patient has been notified of following:     \"This video visit will be conducted via a call between you and your physician/provider. We have found that certain health care needs can be provided without the need for an in-person physical exam.  This service lets us provide the care you need with a video conversation.  If a prescription is necessary we can send it directly to your pharmacy.  If lab work is needed we can place an order for that and you can then stop by our lab to have the test done at a later time.    Video visits are billed at different rates depending on your insurance coverage.  Please reach out to your insurance provider with any questions.    If during the course of the call the physician/provider feels a video visit is not appropriate, you will not be charged for this service.\"    Patient has given verbal consent for Video visit? Yes    Will anyone else be joining your video visit? " "No      Subjective     Shu Duckworth is a 74 year old female who is being evaluated via a billable video visit.      The patient has been notified of following:     \"This video visit will be conducted via a call between you and your physician/provider. We have found that certain health care needs can be provided without the need for an in-person physical exam.  This service lets us provide the care you need with a video conversation.  If a prescription is necessary we can send it directly to your pharmacy.  If lab work is needed we can place an order for that and you can then stop by our lab to have the test done at a later time.    Video visits are billed at different rates depending on your insurance coverage.  Please reach out to your insurance provider with any questions.    If during the course of the call the physician/provider feels a video visit is not appropriate, you will not be charged for this service.\"    Patient has given verbal consent for Video visit? Yes    Will anyone else be joining your video visit? No        Video-Visit Details    Type of service:  Video Visit    Video Start Time: 9:14 AM  Video End Time: 10:10 AM    Originating Location (pt. Location): Home    Distant Location (provider location):  [facility] RHEUMATOLOGY     Platform used for Video Visit: Dennis Pruett MD          "

## 2020-06-16 NOTE — PATIENT INSTRUCTIONS
Diagnosis:  1.  Systemic lupus erythematosus: Stable with low-grade joint pain and dry eye/dry mouth symptoms.    Plan:  1.  Continue Plaquenil 400 mg daily.  While on Plaquenil, undergo annual ophthalmology evaluation for rare retinal toxicity.  2.  Repeat urinalysis, complete blood count, and creatinine at your convenience.  3.  Continue acetaminophen 1000 mg to 2000 mg daily for residual joint pain.  4.  Continue aerobic exercise daily to maintain joint and muscle health.

## 2020-06-29 DIAGNOSIS — M32.9 SYSTEMIC LUPUS ERYTHEMATOSUS, UNSPECIFIED SLE TYPE, UNSPECIFIED ORGAN INVOLVEMENT STATUS (H): ICD-10-CM

## 2020-06-29 LAB
ALBUMIN UR-MCNC: NEGATIVE MG/DL
ALT SERPL W P-5'-P-CCNC: 26 U/L (ref 0–50)
APPEARANCE UR: CLEAR
AST SERPL W P-5'-P-CCNC: 28 U/L (ref 0–45)
BASOPHILS # BLD AUTO: 0 10E9/L (ref 0–0.2)
BASOPHILS NFR BLD AUTO: 0.9 %
BILIRUB UR QL STRIP: NEGATIVE
COLOR UR AUTO: YELLOW
CREAT SERPL-MCNC: 1.04 MG/DL (ref 0.52–1.04)
DIFFERENTIAL METHOD BLD: NORMAL
EOSINOPHIL # BLD AUTO: 0.2 10E9/L (ref 0–0.7)
EOSINOPHIL NFR BLD AUTO: 3.3 %
ERYTHROCYTE [DISTWIDTH] IN BLOOD BY AUTOMATED COUNT: 13.2 % (ref 10–15)
GFR SERPL CREATININE-BSD FRML MDRD: 53 ML/MIN/{1.73_M2}
GLUCOSE UR STRIP-MCNC: NEGATIVE MG/DL
HCT VFR BLD AUTO: 41.5 % (ref 35–47)
HGB BLD-MCNC: 13.6 G/DL (ref 11.7–15.7)
HGB UR QL STRIP: NEGATIVE
IMM GRANULOCYTES # BLD: 0 10E9/L (ref 0–0.4)
IMM GRANULOCYTES NFR BLD: 0.4 %
KETONES UR STRIP-MCNC: NEGATIVE MG/DL
LEUKOCYTE ESTERASE UR QL STRIP: ABNORMAL
LYMPHOCYTES # BLD AUTO: 1.4 10E9/L (ref 0.8–5.3)
LYMPHOCYTES NFR BLD AUTO: 30.5 %
MCH RBC QN AUTO: 31.6 PG (ref 26.5–33)
MCHC RBC AUTO-ENTMCNC: 32.8 G/DL (ref 31.5–36.5)
MCV RBC AUTO: 96 FL (ref 78–100)
MONOCYTES # BLD AUTO: 0.3 10E9/L (ref 0–1.3)
MONOCYTES NFR BLD AUTO: 7.6 %
MUCOUS THREADS #/AREA URNS LPF: PRESENT /LPF
NEUTROPHILS # BLD AUTO: 2.6 10E9/L (ref 1.6–8.3)
NEUTROPHILS NFR BLD AUTO: 57.3 %
NITRATE UR QL: NEGATIVE
NRBC # BLD AUTO: 0 10*3/UL
NRBC BLD AUTO-RTO: 0 /100
PH UR STRIP: 6 PH (ref 5–7)
PLATELET # BLD AUTO: 209 10E9/L (ref 150–450)
RBC # BLD AUTO: 4.31 10E12/L (ref 3.8–5.2)
RBC #/AREA URNS AUTO: <1 /HPF (ref 0–2)
SOURCE: ABNORMAL
SP GR UR STRIP: 1.01 (ref 1–1.03)
SQUAMOUS #/AREA URNS AUTO: <1 /HPF (ref 0–1)
UROBILINOGEN UR STRIP-MCNC: 0 MG/DL (ref 0–2)
WBC # BLD AUTO: 4.5 10E9/L (ref 4–11)
WBC #/AREA URNS AUTO: 3 /HPF (ref 0–5)

## 2020-09-09 ENCOUNTER — TELEPHONE (OUTPATIENT)
Dept: RHEUMATOLOGY | Facility: CLINIC | Age: 75
End: 2020-09-09

## 2020-09-09 DIAGNOSIS — M32.9 SYSTEMIC LUPUS ERYTHEMATOSUS, UNSPECIFIED SLE TYPE, UNSPECIFIED ORGAN INVOLVEMENT STATUS (H): ICD-10-CM

## 2020-09-09 NOTE — LETTER
Shu Duckworth  [address] [facility] [address]    Dear Shu Duckworth,     Regular eye exams are required while taking hydroxychloroquine (Plaquenil). Eye exams should be completed by an eye specialist who is experienced in monitoring for hydroxychloroquine toxicity (a rare effect of the drug that can damage your eyes and vision).  These may be yearly or as determined by your eye specialist.     Although vision problems and loss of sight while taking hydroxychloroquine are very rare, notify your doctor immediately if you notice changes in your vision. The goal of screening is to detect toxicity before your vision is significantly or noticeably impacted. Failing to get proper screening exams puts you at risk of vision changes which may or may not be reversible.    Per the American Academy of Ophthalmology recommendations (2016), screening tests performed may include a 10-2 visual field test, spectral-domain optical coherence tomography (SD OCT), or other screening tests as determined by the eye specialist, including a multifocal electroretinogram (mfERG) or fundus auto-fluorescence (FAF).    We received a refill request from your pharmacy. A copy of your current eye exam was not found in your medical record. Your hydroxychloroquine prescription has been refilled with a limited supply pending confirmation you have had an eye exam to test for hydroxychloroquine toxicity.      We encourage you to bring this letter to your eye exam to discuss the exams that will be performed during your visit. Please request your eye clinic fax or mail a copy of your eye exam report to our clinic indicating that testing was completed for hydroxychloroquine toxicity screening. The exam notes must specifically comment on the potential for hydroxychloroquine toxicity and outline recommended follow-up.    If you have questions about hydroxychloroquine or the information in this letter, please call the clinic at [phone] or  talk to your provider at your next office visit.                        2    Eye Specialist Letter  Dear Eye Specialist,  To ensure safe use of Plaquenil (hydroxychloroquine), we are requesting your assistance in providing the following information. Please return this form to our clinic via mail or fax, or incorporate this information into your visit summary. Your note must indicate whether or not there is evidence of toxicity from Plaquenil use. For questions regarding this form, please call [phone].  Patient Name:   :             Date of Exam:    The following exams were completed during this visit in accordance with the American Academy of Ophthalmology recommendations (2016):  ? 10-2 automated visual field  ? Spectral-domain optical coherence tomography (SD OCT)  ? Multifocal electroretinogram (mfERG)  ? Fundus autofluorescence (FAF)  ? Other (please specify)  Please select from the following:  ? The findings from the above exams are not suggestive of toxicity from Plaquenil (hydroxychloroquine).  ? The findings from the above exams may suggest toxicity from Plaquenil (hydroxychloroquine).  Directly contact the clinic and fax this form.  Please provide additional guidance on whether or not the medication may be continued from your perspective:  Date of next recommended Plaquenil (hydroxychloroquine) screening eye exam:   ? 5 years  ? 1 year  ? 6 months  Other (please specify):   Eye Specialist Name (print):                                                                Date:  Eye Specialist Signature:

## 2020-09-09 NOTE — TELEPHONE ENCOUNTER
[facility] Call Center    Phone Message    May a detailed message be left on voicemail: yes     Reason for Call: Medication Refill Request    Has the patient contacted the pharmacy for the refill? Yes   Name of medication being requested: hydroxychloroquine (PLAQUENIL) 200 MG tablet   Provider who prescribed the medication: Dr Pruett  Pharmacy: [facility] [id number] [place] [address]  Date medication is needed: as soon as you can please      Action Taken: Message routed to:  [facility] Rheumatology    Travel Screening: Not Applicable

## 2020-09-10 NOTE — TELEPHONE ENCOUNTER
Plaquenil      Last Written Prescription Date:  8-8-19  Last Fill Quantity: 180,   # refills: 3  Last Office Visit: 6-16 2020  Future Office visit: 12-  Last Eye Exam:6-1-18    Routing refill request to provider for review/approval because:  No record of recent eye exam in EPIC - Letter sent today.        Kathleen M Doege RN

## 2020-09-11 RX ORDER — HYDROXYCHLOROQUINE SULFATE 200 MG/1
200 TABLET, FILM COATED ORAL 2 TIMES DAILY
Qty: 180 TABLET | Refills: 3 | Status: SHIPPED | OUTPATIENT
Start: 2020-09-11 | End: 2021-08-06

## 2020-09-16 ENCOUNTER — TRANSFERRED RECORDS (OUTPATIENT)
Dept: HEALTH INFORMATION MANAGEMENT | Facility: CLINIC | Age: 75
End: 2020-09-16

## 2020-11-17 ENCOUNTER — TELEPHONE (OUTPATIENT)
Dept: NURSING | Facility: CLINIC | Age: 75
End: 2020-11-17

## 2020-11-17 NOTE — TELEPHONE ENCOUNTER
Call from Shu, is in [place], returning in May.  She has developed small little red spots, very itching on her arms, legs, scalp.  Has tried aloe vera and triamcinolone without relief.  Has tried ice packs which help  Has appointment with primary provider 11/18/20  Advised being out of state to keep appointment with primary provider, agrees

## 2021-01-15 ENCOUNTER — HEALTH MAINTENANCE LETTER (OUTPATIENT)
Age: 76
End: 2021-01-15

## 2021-03-16 ENCOUNTER — TELEPHONE (OUTPATIENT)
Dept: RHEUMATOLOGY | Facility: CLINIC | Age: 76
End: 2021-03-16

## 2021-03-16 NOTE — TELEPHONE ENCOUNTER
Prior Authorization Retail Medication Request    Medication/Dose: hydroxychloroquine (PLAQUENIL) 200 MG tablet   ICD code (if different than what is on RX):  M32.9  Previously Tried and Failed:    Rationale:      Insurance Name:    Insurance ID:        Pharmacy Information (if different than what is on RX)  Name:    Phone:      Trina DOW, RN  Rheumatology RN Care Coordinator  [facility]

## 2021-03-16 NOTE — TELEPHONE ENCOUNTER
Prior Authorization Retail Medication Request    Medication/Dose: hydroxychloroquine (PLAQUENIL) 200 MG tablet (TIER EXCEPTION)   ICD code (if different than what is on RX):    Previously Tried and Failed:    Rationale:     Insurance Name:  [employer]  Insurance ID:  [id number]      Pharmacy Information (if different than what is on RX)  Name: [facility]/PHARMACY [address] [facility] [address] [place] [address] [[id number]]  Phone: [phone]

## 2021-03-16 NOTE — TELEPHONE ENCOUNTER
Central Prior Authorization Team   [phone]    PA Initiation    Medication: hydroxychloroquine (PLAQUENIL) 200 MG tablet (TIER EXCEPTION)   Insurance Company: [employer] - Phone [phone] Fax [phone]  Pharmacy Filling the Rx: [id number] [address] [facility]  Filling Pharmacy Phone: [phone]  Filling Pharmacy Fax: [phone]  Start Date: 3/16/2021

## 2021-03-16 NOTE — TELEPHONE ENCOUNTER
Open New Encounter for PA Tier Exception on medication. Please see new Encounter for status and outcome.

## 2021-03-18 NOTE — TELEPHONE ENCOUNTER
Prior Authorization Approval    Authorization Effective Date: 1/1/2021  Authorization Expiration Date: 12/31/2021  Medication: hydroxychloroquine (PLAQUENIL) 200 MG tablet (TIER EXCEPTION)-[place] APPROVED   Approved Dose/Quantity:   Reference #:     Insurance Company: [employer] - Phone [phone] Fax [phone]  Expected CoPay: NO CO-PAY      CoPay Card Available:      Foundation Assistance Needed:    Which Pharmacy is filling the prescription (Not needed for infusion/clinic administered): [facility] #[id number] - [address]  Pharmacy Notified: Yes-Pharmacy stated that they have a paid claim on medication and patient has picked up medication on 3/17/2021 (no co-pay).  Patient Notified: Yes

## 2021-05-09 ENCOUNTER — HEALTH MAINTENANCE LETTER (OUTPATIENT)
Age: 76
End: 2021-05-09

## 2021-05-22 ENCOUNTER — AMBULATORY - HEALTHEAST (OUTPATIENT)
Dept: SURGERY | Facility: CLINIC | Age: 76
End: 2021-05-22

## 2021-05-22 DIAGNOSIS — Z11.59 ENCOUNTER FOR SCREENING FOR OTHER VIRAL DISEASES: ICD-10-CM

## 2021-05-24 ENCOUNTER — RECORDS - HEALTHEAST (OUTPATIENT)
Dept: ADMINISTRATIVE | Facility: OTHER | Age: 76
End: 2021-05-24

## 2021-06-02 ENCOUNTER — RECORDS - HEALTHEAST (OUTPATIENT)
Dept: ADMINISTRATIVE | Facility: OTHER | Age: 76
End: 2021-06-02

## 2021-06-06 ENCOUNTER — AMBULATORY - HEALTHEAST (OUTPATIENT)
Dept: FAMILY MEDICINE | Facility: CLINIC | Age: 76
End: 2021-06-06

## 2021-06-06 DIAGNOSIS — Z11.59 ENCOUNTER FOR SCREENING FOR OTHER VIRAL DISEASES: ICD-10-CM

## 2021-06-07 ENCOUNTER — RECORDS - HEALTHEAST (OUTPATIENT)
Dept: ADMINISTRATIVE | Facility: OTHER | Age: 76
End: 2021-06-07

## 2021-06-07 LAB
SARS-COV-2 PCR COMMENT: NORMAL
SARS-COV-2 RNA SPEC QL NAA+PROBE: NEGATIVE
SARS-COV-2 VIRUS SPECIMEN SOURCE: NORMAL

## 2021-06-07 ASSESSMENT — MIFFLIN-ST. JEOR: SCORE: 1371.32

## 2021-06-08 ENCOUNTER — ANESTHESIA - HEALTHEAST (OUTPATIENT)
Dept: SURGERY | Facility: CLINIC | Age: 76
End: 2021-06-08

## 2021-06-08 ENCOUNTER — SURGERY - HEALTHEAST (OUTPATIENT)
Dept: SURGERY | Facility: CLINIC | Age: 76
End: 2021-06-08
Payer: COMMERCIAL

## 2021-06-08 ENCOUNTER — RECORDS - HEALTHEAST (OUTPATIENT)
Dept: ADMINISTRATIVE | Facility: OTHER | Age: 76
End: 2021-06-08

## 2021-06-08 ENCOUNTER — COMMUNICATION - HEALTHEAST (OUTPATIENT)
Dept: SCHEDULING | Facility: CLINIC | Age: 76
End: 2021-06-08

## 2021-06-08 ASSESSMENT — MIFFLIN-ST. JEOR: SCORE: 1398.09

## 2021-06-26 NOTE — ANESTHESIA CARE TRANSFER NOTE
Patient spontaneously breathing.  Tidal volumes > 350ml.  Following commands, suctioned and extubated with balloon down.  O2 via mask at 6L.  To PACU, VSS, SBAR report to RN per institutional handoff policy and procedure.  Transfer of care.    Last vitals:   Vitals:    06/08/21 1720   BP: 153/67   Pulse: 74   Resp: 16   Temp: 36.2  C (97.2  F)   SpO2: 96%     Patient's level of consciousness is drowsy  Spontaneous respirations: yes  Maintains airway independently: yes  Dentition unchanged: yes  Oropharynx: oropharynx clear of all foreign objects    QCDR Measures:  ASA# 20 - Surgical Safety Checklist: WHO surgical safety checklist completed prior to induction    PQRS# 430 - Adult PONV Prevention: 4558F - Pt received => 2 anti-emetic agents (different classes) preop & intraop  ASA# 8 - Peds PONV Prevention: NA - Not pediatric patient, not GA or 2 or more risk factors NOT present  PQRS# 424 - Ade-op Temp Management: 4559F - At least one body temp DOCUMENTED => 35.5C or 95.9F within required timeframe  PQRS# 426 - PACU Transfer Protocol: - Transfer of care checklist used  ASA# 14 - Acute Post-op Pain: ASA14B - Patient did NOT experience pain >= 7 out of 10

## 2021-06-26 NOTE — ANESTHESIA POSTPROCEDURE EVALUATION
Patient: Shu Duckworth  Procedure(s):  LEFT TOTAL SHOULDER ARTHROPLASTY (Left)  Anesthesia type: general    Patient location: PACU  Last vitals:   Vitals Value Taken Time   /58 06/08/21 1815   Temp 36.6  C (97.8  F) 06/08/21 1815   Pulse 78 06/08/21 1815   Resp 18 06/08/21 1815   SpO2 97 % 06/08/21 1815     Post vital signs: stable  Level of consciousness: awake and responds to simple questions  Post-anesthesia pain: pain controlled  Post-anesthesia nausea and vomiting: no  Pulmonary: unassisted, return to baseline  Cardiovascular: stable and blood pressure at baseline  Hydration: adequate  Anesthetic events: no    QCDR Measures:  ASA# 11 - Ade-op Cardiac Arrest: ASA11B - Patient did NOT experience unanticipated cardiac arrest  ASA# 12 - Ade-op Mortality Rate: ASA12B - Patient did NOT die  ASA# 13 - PACU Re-Intubation Rate: ASA13B - Patient did NOT require a new airway mgmt  ASA# 10 - Composite Anes Safety: ASA10A - No serious adverse event    Additional Notes:

## 2021-06-26 NOTE — ANESTHESIA PROCEDURE NOTES
Peripheral Block    Patient location during procedure: pre-op  Start time: 6/8/2021 1:45 PM  End time: 6/8/2021 2:55 PM  post-op analgesia per surgeon order as noted in medical record  Staffing:  Performing  Anesthesiologist: Espinoza Jarvis MD  Preanesthetic Checklist  Completed: patient identified, site marked, risks, benefits, and alternatives discussed, timeout performed, consent obtained, airway assessed, oxygen available, suction available, emergency drugs available and hand hygiene performed  Peripheral Block  Block type: brachial plexus, interscalene  Prep: ChloraPrep  Patient position: supine  Patient monitoring: cardiac monitor, continuous pulse oximetry, heart rate and blood pressure  Laterality: left  Injection technique: ultrasound guided    Ultrasound used to visualize needle placement in proximity to nerve being blocked: yes   US used to visualize anesthetic spread  Visualized anatomic structures normal  No Pathological Findings  Permanent ultrasound image captured for medical record  Sterile gel and probe cover used for ultrasound.  Needle  Needle type: echogenic   Needle gauge: 20G  Needle length: 4 in  no peripheral nerve catheter placed  Assessment  Injection assessment: no difficulty with injection, negative aspiration for heme, no paresthesia on injection and incremental injection

## 2021-06-26 NOTE — ANESTHESIA PROCEDURE NOTES
Peripheral Block    Patient location during procedure: pre-op  Start time: 6/8/2021 1:45 PM  End time: 6/8/2021 1:46 PM  post-op analgesia per surgeon order as noted in medical record  Staffing:  Performing  Anesthesiologist: Espinoza Jarvis MD  Preanesthetic Checklist  Completed: patient identified, site marked, risks, benefits, and alternatives discussed, timeout performed, consent obtained, airway assessed, oxygen available, suction available, emergency drugs available and hand hygiene performed  Peripheral Block  Block type: other, superficial cervical plexus  Prep: ChloraPrep  Patient position: supine  Patient monitoring: cardiac monitor, continuous pulse oximetry, heart rate and blood pressure  Laterality: left  Injection technique: ultrasound guided    Ultrasound used to visualize needle placement in proximity to nerve being blocked: yes   US used to visualize anesthetic spread  Visualized anatomic structures normal  No Pathological Findings  Permanent ultrasound image captured for medical record  Sterile gel and probe cover used for ultrasound.  Needle  Needle type: echogenic   Needle gauge: 20G  Needle length: 4 in  no peripheral nerve catheter placed  Assessment  Injection assessment: no difficulty with injection, negative aspiration for heme, no paresthesia on injection and incremental injection

## 2021-06-26 NOTE — ANESTHESIA PREPROCEDURE EVALUATION
Anesthesia Evaluation      Patient summary reviewed   No history of anesthetic complications     Airway   Mallampati: IV  Neck ROM: full   Pulmonary - negative ROS and normal exam    breath sounds clear to auscultation                         Cardiovascular - normal exam  (+) hypertension, ,     Rhythm: regular  Rate: normal,         Neuro/Psych    (+) CVA (No residual deficits ) , depression,     Endo/Other    (+) hypothyroidism, arthritis, obesity,      Comments: Lupus  Sjogren's syndrome    GI/Hepatic/Renal    (+) GERD,             Dental - normal exam                        Anesthesia Plan  Planned anesthetic: general endotracheal and peripheral nerve block  Gen ETT  Pre operative interscalene block   Magnesium infusion  25 mg of ketamine  Decadron and zofran   ASA 2   Induction: intravenous   Anesthetic plan and risks discussed with: patient  Anesthesia plan special considerations: video-assisted, antiemetics,   Post-op plan: routine recovery

## 2021-07-06 VITALS
WEIGHT: 200 LBS | BODY MASS INDEX: 35.34 KG/M2 | BODY MASS INDEX: 35.43 KG/M2 | HEIGHT: 63 IN | BODY MASS INDEX: 36.47 KG/M2 | WEIGHT: 205.9 LBS | BODY MASS INDEX: 34.33 KG/M2 | HEIGHT: 63 IN

## 2021-08-03 DIAGNOSIS — M32.9 SYSTEMIC LUPUS ERYTHEMATOSUS, UNSPECIFIED SLE TYPE, UNSPECIFIED ORGAN INVOLVEMENT STATUS (H): ICD-10-CM

## 2021-08-03 NOTE — LETTER
Shu Duckworth  [address] [facility] [address]    Dear Shu Duckworth,     Regular eye exams are required while taking hydroxychloroquine (Plaquenil). Eye exams should be completed by an eye specialist who is experienced in monitoring for hydroxychloroquine toxicity (a rare effect of the drug that can damage your eyes and vision).  These may be yearly or as determined by your eye specialist.     Although vision problems and loss of sight while taking hydroxychloroquine are very rare, notify your doctor immediately if you notice changes in your vision. The goal of screening is to detect toxicity before your vision is significantly or noticeably impacted. Failing to get proper screening exams puts you at risk of vision changes which may or may not be reversible.    Per the American Academy of Ophthalmology recommendations (2016), screening tests performed may include a 10-2 visual field test, spectral-domain optical coherence tomography (SD OCT), or other screening tests as determined by the eye specialist, including a multifocal electroretinogram (mfERG) or fundus auto-fluorescence (FAF).    We received a refill request from your pharmacy. A copy of your current eye exam was not found in your medical record. Your hydroxychloroquine prescription has been refilled with a limited supply pending confirmation you have had an eye exam to test for hydroxychloroquine toxicity.      We encourage you to bring this letter to your eye exam to discuss the exams that will be performed during your visit. Please request your eye clinic fax or mail a copy of your eye exam report to our clinic indicating that testing was completed for hydroxychloroquine toxicity screening. The exam notes must specifically comment on the potential for hydroxychloroquine toxicity and outline recommended follow-up.    If you have questions about hydroxychloroquine or the information in this letter, please call the clinic at [phone] or  talk to your provider at your next office visit.                        2    Eye Specialist Letter  Dear Eye Specialist,  To ensure safe use of Plaquenil (hydroxychloroquine), we are requesting your assistance in providing the following information. Please return this form to our clinic via mail or fax, or incorporate this information into your visit summary. Your note must indicate whether or not there is evidence of toxicity from Plaquenil use. For questions regarding this form, please call [phone].  Patient Name:   :             Date of Exam:    The following exams were completed during this visit in accordance with the American Academy of Ophthalmology recommendations (2016):  ? 10-2 automated visual field  ? Spectral-domain optical coherence tomography (SD OCT)  ? Multifocal electroretinogram (mfERG)  ? Fundus autofluorescence (FAF)  ? Other (please specify)  Please select from the following:  ? The findings from the above exams are not suggestive of toxicity from Plaquenil (hydroxychloroquine).  ? The findings from the above exams may suggest toxicity from Plaquenil (hydroxychloroquine).  Directly contact the clinic and fax this form.  Please provide additional guidance on whether or not the medication may be continued from your perspective:  Date of next recommended Plaquenil (hydroxychloroquine) screening eye exam:   ? 5 years  ? 1 year  ? 6 months  Other (please specify):   Eye Specialist Name (print):                                                                Date:  Eye Specialist Signature:

## 2021-08-06 RX ORDER — HYDROXYCHLOROQUINE SULFATE 200 MG/1
TABLET, FILM COATED ORAL
Qty: 180 TABLET | Refills: 3 | Status: SHIPPED | OUTPATIENT
Start: 2021-08-06 | End: 2021-08-11

## 2021-08-06 NOTE — TELEPHONE ENCOUNTER
hydroxychloroquine (PLAQUENIL) 200 MG tablet   Take 1 tablet (200 mg) by mouth 2 times daily      Last Written Prescription Date:  9/11/20  Last Fill Quantity: 180,   # refills: 3  Last Office Visit : 6/16/20  Return to clinic in 6 months.   Future Office visit:  none  Last eye exam: overdue. No record of recent eye exam in EPIC - Letter sent today.    Routing refill request to provider for review/approval because:  Overdue visit and last noted eye exam (6/1/18)    Scheduling has been notified to contact the pt for appointment.

## 2021-08-09 DIAGNOSIS — M32.9 SYSTEMIC LUPUS ERYTHEMATOSUS, UNSPECIFIED SLE TYPE, UNSPECIFIED ORGAN INVOLVEMENT STATUS (H): ICD-10-CM

## 2021-08-09 NOTE — TELEPHONE ENCOUNTER
[facility] Call Center    Phone Message    May a detailed message be left on voicemail: yes     Reason for Call: Other: Patient just picked up her refill of plaquenil yesterday. They are in the process of moving and she lost it. She is wondering if she can get another refill. Pharmacy is [facility] in [place].     Action Taken: Message routed to:  [facility] Rheum    Travel Screening: Not Applicable

## 2021-08-11 RX ORDER — HYDROXYCHLOROQUINE SULFATE 200 MG/1
TABLET, FILM COATED ORAL
Qty: 180 TABLET | Refills: 3 | Status: SHIPPED | OUTPATIENT
Start: 2021-08-11 | End: 2022-05-20

## 2021-08-12 ENCOUNTER — TELEPHONE (OUTPATIENT)
Dept: RHEUMATOLOGY | Facility: CLINIC | Age: 76
End: 2021-08-12

## 2021-08-12 NOTE — TELEPHONE ENCOUNTER
Patient needs to be seen sometime in September or sooner, as [place] Soonest available at at the  would be November. Could we possibly fit her into one of Dr Pruett's POA spots on his [place] schedule for sometime in September?

## 2021-08-23 ENCOUNTER — TRANSFERRED RECORDS (OUTPATIENT)
Dept: HEALTH INFORMATION MANAGEMENT | Facility: CLINIC | Age: 76
End: 2021-08-23

## 2021-08-24 ENCOUNTER — TELEPHONE (OUTPATIENT)
Dept: RHEUMATOLOGY | Facility: CLINIC | Age: 76
End: 2021-08-24

## 2021-08-24 VITALS — WEIGHT: 205.91 LBS | BODY MASS INDEX: 36.48 KG/M2

## 2021-08-24 NOTE — TELEPHONE ENCOUNTER
Plaquenil Eye Exam    Date of last eye exam specifically commenting on HCQ toxicity: 8/23/2021  Clinic: [facility] Phone Number: [phone]  Ophthalmologist: Griffin Segundo  Toxicity: NO  Records scanned to chart: Yes      Yoanna Santiago CMA   8/24/2021 11:14 AM

## 2021-09-08 NOTE — PROGRESS NOTES
[facility]  Rheumatology Clinic  Deng Pruett MD  09/10/2021     Name: Shu Duckworth  MRN: [id number]  Age: 76 year old  : 1945  Referring provider: Referred Self    Assessment and Plan:  # Systemic lupus erythematosus with secondary Sjögren's syndrome (malar rash, arthralgia, sicca symptoms, +KATIE, +dsDNA, hypocomplementemia, +Schirmer's test):  Mrs. Duckworth reports ongoing diffuse joint pain, low-grade in character, and improved with activity.  She reports maintaining of vigorous physical activity schedule with daily gardening despite the pain.  Physical exam shows osteoarthritis changes in the right significantly more so than in the left hand fist formation and  strength are normal.    Laboratory evaluation in 2021 revealed hemoglobin of 10.3; urinalysis was clear; creatinine was 1.11 with a GFR of 58.    Systemic lupus erythematosus is quiescent. I recommend continuing Plaquenil monotherapy 400 mg daily. While on the medication, patient should have annual ophthalmology evaluation for retinal toxicity. Most recent exam per her report was conducted in 2021:    Plan:  1.  Continue Plaquenil 400 mg daily.  While on Plaquenil, undergo annual ophthalmology evaluation for rare retinal toxicity.  2.  Repeat urinalysis, complete blood count, and creatinine at your convenience.  3.  Continue acetaminophen 1000 mg to 2000 mg daily for residual joint pain.  4.  Continue aerobic exercise daily to maintain joint and muscle health.  5. Continue symptomatic treatment for mild sicca and dry eye symptoms.  6. Agree that weight loss, diet and exercise will all help with joint symptoms      # L leg swelling: No further swelling, however she is having migratory pain that is working its way up her leg; doppler ultrasounds of the left lower leg to rule out thrombosis in  were negative. Physical exam shows no joint space pathology. Trochanteric pain syndrome is most likely due to  bursitis/tenosynovitis/muscle pain and will likely improve spontaneosly over time. She was advised to continue use of tylenol and tramadol for pain and exercise/weight loss.  I recommend heat 20 minutes twice daily, gentle stretching and exercises for the lateral thigh.  Corticosteroid injection could be supplied if improvement is not occurring steadily in the next 3 to 4 weeks. Use acetaminophen 500 to 1000 mg up to 3 times daily for joint pain.      # Osteoarthritis, thumbs:  No indication for immunomodulatory therapy.  # Ulnar neuropathy, L;  Symptoms reflect post-operative change, following L elbow replacement in 2018. Stable.  #Advanced dental caries and periodontitis: I emphasized the importance of dental hygiene to control of systemic inflammatory symptoms.  I recommended evaluation in dentistry.    Return to clinic in 6 months.     Orders:  Orders Placed This Encounter   Procedures     CBC with platelets     AST     ALT     Creatinine     UA with Microscopic     Follow-up: Return in about 6 months (around 3/10/2022)    I was present with the medical student who participated in the service and in the documentation of the note. I have verified the history and personally performed the physical exam and medical decision making. I agree with the assessment and plan of care as documented in the note.    Deng Pruett M.D.  Staff Rheumatologist, [facility]  Pager [phone]      HPI:   Shu Duckworth has a history including systemic lupus erythematosus with secondary Sjögren's syndrome (+sicca symptoms, +Schirmer test, -minor salivary gland biopsy) who presents for follow-up. The patient was last evaluated in 6-2020 in follow up of lupus. Symptoms were overall judged reasonably controlled on Plaquenil. Plaquenil was continued at 400 mg a day.    Interval history 09-:  Patient was seen in ophthalmology on August 23, 2021.  Impression was of long-term use of Plaquenil; no evidence of retinal Plaquenil  toxicity. She has been on plaquenil since 2010, 400 mg/day. Today she states she is having no lupus symptoms currently. She is still having osteoarthritis in her shoulders, hands and knees. She also states she is having generalized muscle and joint pain, however the muscle pain is worse in her legs and upper thighs, this pain started in the bottom of her left leg and has moved up over the past three months, it is sharp in character. She has no personal history of blood clots, but her son has had many blood clots. She says that she had her left shoulder replaced in June of 2021, however she says her right may need to be replaced soon. She has had both knees replaced and says they need to be replaced again. She says that it takes a couple of hours for her joints to loosen up. She is also having some neck pain that has gotten worse over the past year. Her sicca symptoms have been stable.    She says she has gained around 30 lbs back since COVID started due to her gym being shut down and not able to exercise as much.      No new or persistent headache.  Some new hair thinning.  No change in vision or hearing.  No inflammatory eye disease history.  No history of miscarriage.  No history of blood clots.  Some dry eyes or dry mouth.  No facial rash.  No photosensitive rash.  No personal or first-degree relatives with psoriasis.  No nasal or oral ulcers.  No recurrent epistaxis or hemoptysis.  No recurrent sinusitis or recurrent pneumonia.  No chest pain, some shortness of breath (since 2010).  No abdominal pain, constipation, some diarrhea.  No blood in stool or black tarry stools.  No nausea or vomiting at baseline, sometimes nauseous with diarrhea.  No foamy or frothy urine.  No bowel or bladder incontinence.  No change in strength or sensation in the arms or legs. Some history of raynauds.  No skin thickening or tightening consistent with sclerodactyly.  No symptoms consistent with dactylitis.  No red hot or swollen  joints.  some joint pain.  some joint stiffness.  Muscle pain, in both legs, problems with using her legs, some weakness, falling easily. No fevers, chills, weight loss or night sweats.      Interval history 06-:    She continues with diffuse pain; the sides of her feet, muscles, and joints.   She notes swelling in her R hand, constant, not affected by activity.     She has been gardening frequently; she is able to grasp and pull weeds. She opens jars fine.  She takes acetaminopehn 500 mg 2-4 tabs per day. +significant relief from joint pain, temporary.    She notes some balance/insteadiness with getting up from a chair, or with rapid turning. No vertigo; minimal dizziness normally.       Review of Systems:   Pertinent items are noted in HPI, remainder of complete ROS is negative.        Active Medications:   Current Outpatient Medications   Medication     Acetaminophen (TYLENOL PO)     Cholecalciferol (VITAMIN D3) 1000 units CAPS     ClonAZEPAM (KLONOPIN) 0.5 MG tablet     famotidine (PEPCID) 20 MG tablet     fluticasone (FLONASE) 50 MCG/ACT nasal spray     hydrochlorothiazide (HYDRODIURIL) 50 MG tablet     hydroxychloroquine (PLAQUENIL) 200 MG tablet     levothyroxine (SYNTHROID/LEVOTHROID) 100 MCG tablet     metoprolol succinate (TOPROL-XL) 50 MG 24 hr tablet     Multiple Vitamins-Minerals (CENTRUM SILVER PO)     pantoprazole (PROTONIX) 40 MG enteric coated tablet     potassium chloride ER (K-DUR/KLOR-CON M) 20 MEQ CR tablet     rOPINIRole (REQUIP) 1 MG tablet     rOPINIRole (REQUIP) 5 MG tablet     sertraline (ZOLOFT) 100 MG tablet     simvastatin (ZOCOR) 20 MG tablet     traMADol (ULTRAM) 50 MG tablet     triamcinolone (KENALOG) 0.1 % external cream     amphetamine-dextroamphetamine (ADDERALL) 20 MG per tablet     atenolol (TENORMIN) 25 MG tablet     cyclobenzaprine (FLEXERIL) 10 MG tablet     mometasone (NASONEX) 50 MCG/ACT nasal spray     ranitidine (ZANTAC) 150 MG tablet     No current  facility-administered medications for this visit.       Allergies:  Dust Mite Extract   Penicillins   Sulfa Drugs     Past Medical History:  Encounter for long-term current use of medication  Lymphocytic colitis   Renal insufficiency   Hypokalemia   Diastolic dysfunction   Tobacco use disorder   Shortness of breath   Sjögren's syndrome   Vitamin D deficiency   Systemic lupus erythematosus   Migraines   Overweight  Primary osteoarthritis   Esophageal reflux   Anemia, unspecified   Attention deficit disorder without mention of hyperactivity   Generalized anxiety disorder   Episodic Mood Disorder   Hypothyroid   Hyperlipidemia    Allergic rhinitis seasonal    Hypertension     Past Surgical History:  Hysterectomy 1/1/1978 - 12/31/1978  ([place]) [place] Bone Graft Tib Fib FX W BMP 1/1/1992 - 12/31/1992    Bunionectomy, right   Knee replacement, left 1/1/2006 - 12/31/2006      Thumb(s) surgery  Knee replacement, right 6/1/2008 - 6/30/2008  Upper arm/elbow surgery 12/1/2008 - 12/31/2008       Colonoscopy screening 1/27/11   Bladder suspension 10/1/2005 - 10/31/2005       Colon surgery 6/1/2018 - 6/30/2018      Family History:    The patient's family history includes Sjogren's in her daughter; lupus (with more skin involvement) in her sister.  Son has extensive history of arterial blood clots    Social History:  The patient reports that she has quit smoking. She smoked 0.50 packs per day. She has never used smokeless tobacco. She reports current alcohol use. She reports that she does not use drugs. She is a retired .  PCP: Johnson Arnold MD  Marital Status:      Physical Exam:   /71 (BP Location: Right arm, Patient Position: Sitting, Cuff Size: Adult Regular)   Pulse 86   Wt 92.7 kg (204 lb 6.4 oz)   SpO2 97%   BMI 36.21 kg/m     Wt Readings from Last 4 Encounters:   09/10/21 92.7 kg (204 lb 6.4 oz)   08/24/21 93.4 kg (205 lb 14.6 oz)   06/08/21 93.4 kg (205 lb 14.4 oz)   06/07/21 90.7 kg (200 lb)      Constitutional: Well-developed, appearing stated age; cooperative.  Eyes: Normal EOM, PERRLA, vision, conjunctiva, sclera.  ENT: Normal external ears, nose, hearing, lips, teeth, gums, normal saliva pool  Neck: No mass or thyroid enlargement.  Cardio: RRR, no murmurs rubs or gallops  Resp: lungs CTA bilaterally at bases of lungs, normal work of breathing  MS:  On the right hand, there is bony angulation in the knuckles more so than left hand, some swelling of the left wrist, Good range of motion of the wrist joint, no pain upon palpation of the joint space. Good fist formation. Elbows have normal extension and flexion, no effusion. Fatty changes on left knee secondary to knee replacement surgery, normal ROM, no effusion. Hips have normal ROM bilaterally (interal, external rotation and flexion/extension), pain upon palpation of left leg around greater head of the trochanter.  Skin: No nail pitting, alopecia, rash, nodules or lesions.  Neuro: Normal cranial nerves  Psych: Normal judgement, orientation, memory, affect.     Imaging:    Laboratory:   RHEUM RESULTS Latest Ref Rng & Units 6/16/2010 7/21/2010 12/15/2010   ALBUMIN 3.3 - 4.9 g/dL - - -   ALT 0 - 50 U/L - - 25   AST 0 - 45 U/L - - 32   COMPLEMENT C3 76 - 169 mg/dL 114 - 118   COMPLEMENT C4 15 - 50 mg/dL 9(L) - 11(L)   CK TOTAL 30 - 225 U/L - - -   CREATININE 0.52 - 1.04 mg/dL 1.22(H) 1.08(H) 1.07(H)   CRP 0.0 - 8.0 mg/L 5.8 - <5.0   DNA <10 IU/mL 35(H) - <15   PEDRO 0 - 1.0 1.2(H) - -   GFR ESTIMATE, IF BLACK >60 mL/min/[1.73:m2] 54(L) 62 62   GFR ESTIMATE >60 mL/min/[1.73:m2] 45(L) 51(L) 52(L)   HEMATOCRIT 35.0 - 47.0 % 41.3 - 42.4   HEMOGLOBIN 12.0 - 16.0 g/dL 13.5 - 13.6   HEPBANG NEG - - -   HCVAB NEG - - -   WBC 4.0 - 11.0 10e9/L 8.1 - 7.6   RBC 3.8 - 5.2 10e12/L 4.47 - 4.67   RDW 10.0 - 15.0 % 15.1(H) - 15.0   MCHC 31.5 - 36.5 g/dL 32.7 - 32.1   MCV 78 - 100 fl 92 - 91   PLATELET COUNT 150 - 450 10e9/L 230 - 228   RHEUMATOID FACTOR 0 - 14 IU/mL 10 - -    ESR 0 - 30 mm/h - - 10     Rheumatoid Factor   Date Value Ref Range Status   06/16/2010 10 0 - 14 IU/mL Final     Ribonucleic Protein IgG Antibody   Date Value Ref Range Status   06/16/2010 0  Final     Comment:     Reference range: 0 to 40  Unit: AU/mL  (Note)  REFERENCE INTERVAL: Ribonucleic Protein (SAMANTHA), IgG   29 AU/mL or Less ............. Negative   30 - 40 AU/mL ................ Equivocal   41 AU/mL or Greater .......... Positive    RNP antibody is seen in % of mixed connective tissue  disease and is considered specific for this syndrome if  other antibodies are negative. RNP is also present in  20-30% of systemic lupus erythematosus (SLE) and 15-25%  of progressive systemic sclerosis (PSS).  Performed by [facility],  [address] [facility],[place] [id number] [phone]  [email or web address], Virgen Casillas MD, Lab. Director     Cordero Antibody IgG   Date Value Ref Range Status   06/16/2010 0  Final     Comment:     Reference range: 0 to 40  Unit: AU/mL  (Note)  REFERENCE INTERVALS: SMITH (SAMANTHA) Ab, IgG   29 AU/mL or Less ............. Negative   30 - 40 AU/mL ................ Equivocal   41 AU/mL or Greater .......... Positive    Cordero antibody is very specific for systemic lupus  erythematosus (SLE) but only occurs in 30-35% of SLE  cases. The presence of antibodies to Cordero is often  associated with renal disease.  Performed by [facility],  [address] [facility],[place] [id number] [phone]  [email or web address], Virgen Casillas MD, Lab. Director     SSA (RO) Antibody IgG   Date Value Ref Range Status   06/16/2010 4  Final     Comment:     Reference range: 0 to 40  Unit: AU/mL  (Note)  REFERENCE INTERVALS: SSA (Ro) (SAMANTHA) Ab, IgG   29 AU/mL or Less ............. Negative   30 - 40 AU/mL ................ Equivocal   41 AU/mL or Greater .......... Positive    SSA (Ro) antibody is seen in 70-75% of Sjogren syndrome  cases, 30-40% of systemic lupus erythematosus (SLE) and  5-10% of progressive systemic  sclerosis (PSS).  Performed by [facility],  [address] [facility],[place] [id number] [phone]  [email or web address], Virgen Casillas MD, Lab. Director     SSB (LA) Antibody IgG   Date Value Ref Range Status   06/16/2010 0  Final     Comment:     Reference range: 0 to 40  Unit: AU/mL  (Note)  REFERENCE INTERVALS: SSB (La) (SAMANTHA) Ab, IgG   29 AU/mL or Less ............. Negative   30 - 40 AU/mL ................ Equivocal   41 AU/mL or Greater .......... Positive    SSB (La) antibody is seen in 50-60% of Sjogren syndrome  cases and is specific if it is the only SAMANTHA antibody  present. 15-25% of patients with systemic lupus  erythematosus (SLE) and 5-10% of patients with progressive  systemic sclerosis (PSS) also have this antibody.  Performed by [facility],  [address] [facility] [address] [phone]  [email or web address], Virgen Casillas MD, Lab. Director     KATIE Screen by [facility]   Date Value Ref Range Status   06/16/2010 1.2 (H) 0 - 1.0 Final     Comment:     Interpretation:  Weakly Positive     DNA-ds   Date Value Ref Range Status   10/04/2018 1 <10 IU/mL Final     Comment:     Negative     Hep B Surface Agn   Date Value Ref Range Status   10/12/2011 Negative NEG Final     Albumin Fraction   Date Value Ref Range Status   12/22/2016 4.1 3.7 - 5.1 g/dL Final     Alpha 2 Fraction   Date Value Ref Range Status   12/22/2016 0.9 0.5 - 0.9 g/dL Final     Beta Fraction   Date Value Ref Range Status   12/22/2016 0.8 0.6 - 1.0 g/dL Final     Gamma Fraction   Date Value Ref Range Status   12/22/2016 0.8 0.7 - 1.6 g/dL Final     Monoclonal Peak   Date Value Ref Range Status   12/22/2016 0.0 0.0 g/dL Final     ELP Interpretation:   Date Value Ref Range Status   12/22/2016   Final    Essentially normal electrophoretic pattern.  No monoclonal protein seen.   Pathologic significance requires clinical correlation.  ISIDRO Murguia M.D.,   Ph.D., Pathologist ().

## 2021-09-10 ENCOUNTER — OFFICE VISIT (OUTPATIENT)
Dept: RHEUMATOLOGY | Facility: CLINIC | Age: 76
End: 2021-09-10
Attending: INTERNAL MEDICINE
Payer: COMMERCIAL

## 2021-09-10 VITALS
HEART RATE: 86 BPM | WEIGHT: 204.4 LBS | BODY MASS INDEX: 36.21 KG/M2 | SYSTOLIC BLOOD PRESSURE: 113 MMHG | DIASTOLIC BLOOD PRESSURE: 71 MMHG | OXYGEN SATURATION: 97 %

## 2021-09-10 DIAGNOSIS — Z79.899 ENCOUNTER FOR LONG-TERM CURRENT USE OF MEDICATION: ICD-10-CM

## 2021-09-10 DIAGNOSIS — M32.9 SYSTEMIC LUPUS ERYTHEMATOSUS, UNSPECIFIED SLE TYPE, UNSPECIFIED ORGAN INVOLVEMENT STATUS (H): Primary | ICD-10-CM

## 2021-09-10 DIAGNOSIS — M32.8 OTHER FORMS OF SYSTEMIC LUPUS ERYTHEMATOSUS, UNSPECIFIED ORGAN INVOLVEMENT STATUS (H): ICD-10-CM

## 2021-09-10 PROCEDURE — G0463 HOSPITAL OUTPT CLINIC VISIT: HCPCS

## 2021-09-10 PROCEDURE — 99214 OFFICE O/P EST MOD 30 MIN: CPT | Mod: GC | Performed by: INTERNAL MEDICINE

## 2021-09-10 RX ORDER — VILAZODONE HYDROCHLORIDE 20 MG/1
20 TABLET ORAL DAILY
COMMUNITY
Start: 2021-08-19

## 2021-09-10 RX ORDER — VENLAFAXINE HYDROCHLORIDE 150 MG/1
150 CAPSULE, EXTENDED RELEASE ORAL DAILY
COMMUNITY
Start: 2021-08-03

## 2021-09-10 NOTE — PATIENT INSTRUCTIONS
Diagnosis:  1.  Systemic lupus erythematosus: I think that symptoms of lupus are generally well controlled on Plaquenil treatment.  I recommend no change in daily Plaquenil dose. Plan hydroxychloroquine 400 mg daily. Annual eye exams to monitor for toxicity.  2.  Left lateral thigh/hip discomfort: Trochanteric pain syndrome likely accounts for this pain, rather than ball-and-socket joint in the left hip.  I recommend heat 20 minutes twice daily, gentle stretching and exercises for the lateral thigh.  Corticosteroid injection could be supplied if improvement is not occurring steadily in the next 3 to 4 weeks. Use acetaminophen 500 to 1000 mg up to 3 times daily for joint pain.    3.  Dental caries: Oral health is important for managing systemic inflammatory disease.  I recommend professional dental attention to gums and lower teeth.  Continue symptomatic treatment for Sjogren's syndrome with frequent drinking, artificial tears, etc.

## 2021-09-10 NOTE — NURSING NOTE
Chief Complaint   Patient presents with     RECHECK     Lupus         /71 (BP Location: Right arm, Patient Position: Sitting, Cuff Size: Adult Regular)   Pulse 86   Wt 92.7 kg (204 lb 6.4 oz)   SpO2 97%   BMI 36.21 kg/m      Piyush Harry, EMT

## 2021-09-10 NOTE — LETTER
9/10/2021       RE: Shu Duckworth  [address] [facility] [address]     Dear Colleague,    Thank you for referring your patient, Shu Duckworth, to the [facility] RHEUMATOLOGY [facility]INIC [place] at [facility]. Please see a copy of my visit note below.    [facility]  Rheumatology Clinic  Deng Pruett MD  09/10/2021     Name: Shu Duckworth  MRN: [id number]  Age: 76 year old  : 1945  Referring provider: Referred Self    Assessment and Plan:  # Systemic lupus erythematosus with secondary Sjögren's syndrome (malar rash, arthralgia, sicca symptoms, +KATIE, +dsDNA, hypocomplementemia, +Schirmer's test):  Mrs. Duckworth reports ongoing diffuse joint pain, low-grade in character, and improved with activity.  She reports maintaining of vigorous physical activity schedule with daily gardening despite the pain.  Physical exam shows osteoarthritis changes in the right significantly more so than in the left hand fist formation and  strength are normal.    Laboratory evaluation in 2021 revealed hemoglobin of 10.3; urinalysis was clear; creatinine was 1.11 with a GFR of 58.    Systemic lupus erythematosus is quiescent. I recommend continuing Plaquenil monotherapy 400 mg daily. While on the medication, patient should have annual ophthalmology evaluation for retinal toxicity. Most recent exam per her report was conducted in 2021:    Plan:  1.  Continue Plaquenil 400 mg daily.  While on Plaquenil, undergo annual ophthalmology evaluation for rare retinal toxicity.  2.  Repeat urinalysis, complete blood count, and creatinine at your convenience.  3.  Continue acetaminophen 1000 mg to 2000 mg daily for residual joint pain.  4.  Continue aerobic exercise daily to maintain joint and muscle health.  5. Continue symptomatic treatment for mild sicca and dry eye symptoms.  6. Agree that weight loss, diet and exercise will all help with joint  symptoms      # L leg swelling: No further swelling, however she is having migratory pain that is working its way up her leg; doppler ultrasounds of the left lower leg to rule out thrombosis in 12-19 were negative. Physical exam shows no joint space pathology. Trochanteric pain syndrome is most likely due to bursitis/tenosynovitis/muscle pain and will likely improve spontaneosly over time. She was advised to continue use of tylenol and tramadol for pain and exercise/weight loss.  I recommend heat 20 minutes twice daily, gentle stretching and exercises for the lateral thigh.  Corticosteroid injection could be supplied if improvement is not occurring steadily in the next 3 to 4 weeks. Use acetaminophen 500 to 1000 mg up to 3 times daily for joint pain.      # Osteoarthritis, thumbs:  No indication for immunomodulatory therapy.  # Ulnar neuropathy, L;  Symptoms reflect post-operative change, following L elbow replacement in 2018. Stable.  #Advanced dental caries and periodontitis: I emphasized the importance of dental hygiene to control of systemic inflammatory symptoms.  I recommended evaluation in dentistry.    Return to clinic in 6 months.     Orders:  Orders Placed This Encounter   Procedures     CBC with platelets     AST     ALT     Creatinine     UA with Microscopic     Follow-up: Return in about 6 months (around 3/10/2022)    I was present with the medical student who participated in the service and in the documentation of the note. I have verified the history and personally performed the physical exam and medical decision making. I agree with the assessment and plan of care as documented in the note.    Deng Pruett M.D.  Staff Rheumatologist, [facility]  Pager [phone]      HPI:   Shu Duckworth has a history including systemic lupus erythematosus with secondary Sjögren's syndrome (+sicca symptoms, +Schirmer test, -minor salivary gland biopsy) who presents for follow-up. The patient was last evaluated in  6-2020 in follow up of lupus. Symptoms were overall judged reasonably controlled on Plaquenil. Plaquenil was continued at 400 mg a day.    Interval history 09-:  Patient was seen in ophthalmology on August 23, 2021.  Impression was of long-term use of Plaquenil; no evidence of retinal Plaquenil toxicity. She has been on plaquenil since 2010, 400 mg/day. Today she states she is having no lupus symptoms currently. She is still having osteoarthritis in her shoulders, hands and knees. She also states she is having generalized muscle and joint pain, however the muscle pain is worse in her legs and upper thighs, this pain started in the bottom of her left leg and has moved up over the past three months, it is sharp in character. She has no personal history of blood clots, but her son has had many blood clots. She says that she had her left shoulder replaced in June of 2021, however she says her right may need to be replaced soon. She has had both knees replaced and says they need to be replaced again. She says that it takes a couple of hours for her joints to loosen up. She is also having some neck pain that has gotten worse over the past year. Her sicca symptoms have been stable.    She says she has gained around 30 lbs back since COVID started due to her gym being shut down and not able to exercise as much.      No new or persistent headache.  Some new hair thinning.  No change in vision or hearing.  No inflammatory eye disease history.  No history of miscarriage.  No history of blood clots.  Some dry eyes or dry mouth.  No facial rash.  No photosensitive rash.  No personal or first-degree relatives with psoriasis.  No nasal or oral ulcers.  No recurrent epistaxis or hemoptysis.  No recurrent sinusitis or recurrent pneumonia.  No chest pain, some shortness of breath (since 2010).  No abdominal pain, constipation, some diarrhea.  No blood in stool or black tarry stools.  No nausea or vomiting at baseline,  sometimes nauseous with diarrhea.  No foamy or frothy urine.  No bowel or bladder incontinence.  No change in strength or sensation in the arms or legs. Some history of raynauds.  No skin thickening or tightening consistent with sclerodactyly.  No symptoms consistent with dactylitis.  No red hot or swollen joints.  some joint pain.  some joint stiffness.  Muscle pain, in both legs, problems with using her legs, some weakness, falling easily. No fevers, chills, weight loss or night sweats.      Interval history 06-:    She continues with diffuse pain; the sides of her feet, muscles, and joints.   She notes swelling in her R hand, constant, not affected by activity.     She has been gardening frequently; she is able to grasp and pull weeds. She opens jars fine.  She takes acetaminopehn 500 mg 2-4 tabs per day. +significant relief from joint pain, temporary.    She notes some balance/insteadiness with getting up from a chair, or with rapid turning. No vertigo; minimal dizziness normally.       Review of Systems:   Pertinent items are noted in HPI, remainder of complete ROS is negative.        Active Medications:   Current Outpatient Medications   Medication     Acetaminophen (TYLENOL PO)     Cholecalciferol (VITAMIN D3) 1000 units CAPS     ClonAZEPAM (KLONOPIN) 0.5 MG tablet     famotidine (PEPCID) 20 MG tablet     fluticasone (FLONASE) 50 MCG/ACT nasal spray     hydrochlorothiazide (HYDRODIURIL) 50 MG tablet     hydroxychloroquine (PLAQUENIL) 200 MG tablet     levothyroxine (SYNTHROID/LEVOTHROID) 100 MCG tablet     metoprolol succinate (TOPROL-XL) 50 MG 24 hr tablet     Multiple Vitamins-Minerals (CENTRUM SILVER PO)     pantoprazole (PROTONIX) 40 MG enteric coated tablet     potassium chloride ER (K-DUR/KLOR-CON M) 20 MEQ CR tablet     rOPINIRole (REQUIP) 1 MG tablet     rOPINIRole (REQUIP) 5 MG tablet     sertraline (ZOLOFT) 100 MG tablet     simvastatin (ZOCOR) 20 MG tablet     traMADol (ULTRAM) 50 MG tablet      triamcinolone (KENALOG) 0.1 % external cream     amphetamine-dextroamphetamine (ADDERALL) 20 MG per tablet     atenolol (TENORMIN) 25 MG tablet     cyclobenzaprine (FLEXERIL) 10 MG tablet     mometasone (NASONEX) 50 MCG/ACT nasal spray     ranitidine (ZANTAC) 150 MG tablet     No current facility-administered medications for this visit.       Allergies:  Dust Mite Extract   Penicillins   Sulfa Drugs     Past Medical History:  Encounter for long-term current use of medication  Lymphocytic colitis   Renal insufficiency   Hypokalemia   Diastolic dysfunction   Tobacco use disorder   Shortness of breath   Sjögren's syndrome   Vitamin D deficiency   Systemic lupus erythematosus   Migraines   Overweight  Primary osteoarthritis   Esophageal reflux   Anemia, unspecified   Attention deficit disorder without mention of hyperactivity   Generalized anxiety disorder   Episodic Mood Disorder   Hypothyroid   Hyperlipidemia    Allergic rhinitis seasonal    Hypertension     Past Surgical History:  Hysterectomy 1/1/1978 - 12/31/1978  (IA) ND Bone Graft Tib Fib FX W BMP 1/1/1992 - 12/31/1992    Bunionectomy, right   Knee replacement, left 1/1/2006 - 12/31/2006      Thumb(s) surgery  Knee replacement, right 6/1/2008 - 6/30/2008  Upper arm/elbow surgery 12/1/2008 - 12/31/2008       Colonoscopy screening 1/27/11   Bladder suspension 10/1/2005 - 10/31/2005       Colon surgery 6/1/2018 - 6/30/2018      Family History:    The patient's family history includes Sjogren's in her daughter; lupus (with more skin involvement) in her sister.  Son has extensive history of arterial blood clots    Social History:  The patient reports that she has quit smoking. She smoked 0.50 packs per day. She has never used smokeless tobacco. She reports current alcohol use. She reports that she does not use drugs. She is a retired .  PCP: Johnson Arnold MD  Marital Status:      Physical Exam:   /71 (BP Location: Right arm, Patient  Position: Sitting, Cuff Size: Adult Regular)   Pulse 86   Wt 92.7 kg (204 lb 6.4 oz)   SpO2 97%   BMI 36.21 kg/m     Wt Readings from Last 4 Encounters:   09/10/21 92.7 kg (204 lb 6.4 oz)   08/24/21 93.4 kg (205 lb 14.6 oz)   06/08/21 93.4 kg (205 lb 14.4 oz)   06/07/21 90.7 kg (200 lb)     Constitutional: Well-developed, appearing stated age; cooperative.  Eyes: Normal EOM, PERRLA, vision, conjunctiva, sclera.  ENT: Normal external ears, nose, hearing, lips, teeth, gums, normal saliva pool  Neck: No mass or thyroid enlargement.  Cardio: RRR, no murmurs rubs or gallops  Resp: lungs CTA bilaterally at bases of lungs, normal work of breathing  MS:  On the right hand, there is bony angulation in the knuckles more so than left hand, some swelling of the left wrist, Good range of motion of the wrist joint, no pain upon palpation of the joint space. Good fist formation. Elbows have normal extension and flexion, no effusion. Fatty changes on left knee secondary to knee replacement surgery, normal ROM, no effusion. Hips have normal ROM bilaterally (interal, external rotation and flexion/extension), pain upon palpation of left leg around greater head of the trochanter.  Skin: No nail pitting, alopecia, rash, nodules or lesions.  Neuro: Normal cranial nerves  Psych: Normal judgement, orientation, memory, affect.     Imaging:    Laboratory:   RHEUM RESULTS Latest Ref Rng & Units 6/16/2010 7/21/2010 12/15/2010   ALBUMIN 3.3 - 4.9 g/dL - - -   ALT 0 - 50 U/L - - 25   AST 0 - 45 U/L - - 32   COMPLEMENT C3 76 - 169 mg/dL 114 - 118   COMPLEMENT C4 15 - 50 mg/dL 9(L) - 11(L)   CK TOTAL 30 - 225 U/L - - -   CREATININE 0.52 - 1.04 mg/dL 1.22(H) 1.08(H) 1.07(H)   CRP 0.0 - 8.0 mg/L 5.8 - <5.0   DNA <10 IU/mL 35(H) - <15   PEDRO 0 - 1.0 1.2(H) - -   GFR ESTIMATE, IF BLACK >60 mL/min/[1.73:m2] 54(L) 62 62   GFR ESTIMATE >60 mL/min/[1.73:m2] 45(L) 51(L) 52(L)   HEMATOCRIT 35.0 - 47.0 % 41.3 - 42.4   HEMOGLOBIN 12.0 - 16.0 g/dL 13.5 -  13.6   HEPBANG NEG - - -   HCVAB NEG - - -   WBC 4.0 - 11.0 10e9/L 8.1 - 7.6   RBC 3.8 - 5.2 10e12/L 4.47 - 4.67   RDW 10.0 - 15.0 % 15.1(H) - 15.0   MCHC 31.5 - 36.5 g/dL 32.7 - 32.1   MCV 78 - 100 fl 92 - 91   PLATELET COUNT 150 - 450 10e9/L 230 - 228   RHEUMATOID FACTOR 0 - 14 IU/mL 10 - -   ESR 0 - 30 mm/h - - 10     Rheumatoid Factor   Date Value Ref Range Status   06/16/2010 10 0 - 14 IU/mL Final     Ribonucleic Protein IgG Antibody   Date Value Ref Range Status   06/16/2010 0  Final     Comment:     Reference range: 0 to 40  Unit: AU/mL  (Note)  REFERENCE INTERVAL: Ribonucleic Protein (SAMANTHA), IgG   29 AU/mL or Less ............. Negative   30 - 40 AU/mL ................ Equivocal   41 AU/mL or Greater .......... Positive    RNP antibody is seen in % of mixed connective tissue  disease and is considered specific for this syndrome if  other antibodies are negative. RNP is also present in  20-30% of systemic lupus erythematosus (SLE) and 15-25%  of progressive systemic sclerosis (PSS).  Performed by [facility],  [address] [place] [id number] [phone]  [email or web address], Virgen Casillas MD, Lab. Director     Cordero Antibody IgG   Date Value Ref Range Status   06/16/2010 0  Final     Comment:     Reference range: 0 to 40  Unit: AU/mL  (Note)  REFERENCE INTERVALS: SMITH (SAMANTHA) Ab, IgG   29 AU/mL or Less ............. Negative   30 - 40 AU/mL ................ Equivocal   41 AU/mL or Greater .......... Positive    Cordero antibody is very specific for systemic lupus  erythematosus (SLE) but only occurs in 30-35% of SLE  cases. The presence of antibodies to Cordero is often  associated with renal disease.  Performed by [facility],  [address] [place] [address],[place] [address] [phone]  [email or web address], Virgen Casillas MD, Lab. Director     SSA (RO) Antibody IgG   Date Value Ref Range Status   06/16/2010 4  Final     Comment:     Reference range: 0 to 40  Unit: AU/mL  (Note)  REFERENCE INTERVALS: SSA (Ro)  (SAMANTHA) Ab, IgG   29 AU/mL or Less ............. Negative   30 - 40 AU/mL ................ Equivocal   41 AU/mL or Greater .......... Positive    SSA (Ro) antibody is seen in 70-75% of Sjogren syndrome  cases, 30-40% of systemic lupus erythematosus (SLE) and  5-10% of progressive systemic sclerosis (PSS).  Performed by [facility],  [address] [facility],[place] [id number] [phone]  [email or web address], Virgen Casillas MD, Lab. Director     SSB (LA) Antibody IgG   Date Value Ref Range Status   06/16/2010 0  Final     Comment:     Reference range: 0 to 40  Unit: AU/mL  (Note)  REFERENCE INTERVALS: SSB (La) (SAMANTHA) Ab, IgG   29 AU/mL or Less ............. Negative   30 - 40 AU/mL ................ Equivocal   41 AU/mL or Greater .......... Positive    SSB (La) antibody is seen in 50-60% of Sjogren syndrome  cases and is specific if it is the only SAMANTHA antibody  present. 15-25% of patients with systemic lupus  erythematosus (SLE) and 5-10% of patients with progressive  systemic sclerosis (PSS) also have this antibody.  Performed by [facility],  [address] [facility] [address] [phone]  [email or web address], Virgen Casillas MD, Lab. Director     KATIE Screen by [facility]   Date Value Ref Range Status   06/16/2010 1.2 (H) 0 - 1.0 Final     Comment:     Interpretation:  Weakly Positive     DNA-ds   Date Value Ref Range Status   10/04/2018 1 <10 IU/mL Final     Comment:     Negative     Hep B Surface Agn   Date Value Ref Range Status   10/12/2011 Negative NEG Final     Albumin Fraction   Date Value Ref Range Status   12/22/2016 4.1 3.7 - 5.1 g/dL Final     Alpha 2 Fraction   Date Value Ref Range Status   12/22/2016 0.9 0.5 - 0.9 g/dL Final     Beta Fraction   Date Value Ref Range Status   12/22/2016 0.8 0.6 - 1.0 g/dL Final     Gamma Fraction   Date Value Ref Range Status   12/22/2016 0.8 0.7 - 1.6 g/dL Final     Monoclonal Peak   Date Value Ref Range Status   12/22/2016 0.0 0.0 g/dL Final     ELP Interpretation:   Date Value Ref  Range Status   12/22/2016   Final    Essentially normal electrophoretic pattern.  No monoclonal protein seen.   Pathologic significance requires clinical correlation.  ISIDRO Murguia M.D.,   Ph.D., Pathologist ().             Again, thank you for allowing me to participate in the care of your patient.      Sincerely,    Deng Pruett MD

## 2021-10-24 ENCOUNTER — HEALTH MAINTENANCE LETTER (OUTPATIENT)
Age: 76
End: 2021-10-24

## 2021-12-28 DIAGNOSIS — M32.9 SYSTEMIC LUPUS ERYTHEMATOSUS, UNSPECIFIED SLE TYPE, UNSPECIFIED ORGAN INVOLVEMENT STATUS (H): ICD-10-CM

## 2021-12-30 RX ORDER — HYDROXYCHLOROQUINE SULFATE 200 MG/1
TABLET, FILM COATED ORAL
Qty: 60 TABLET | Refills: 29 | OUTPATIENT
Start: 2021-12-30

## 2022-01-12 VITALS — BODY MASS INDEX: 36.48 KG/M2 | WEIGHT: 205.9 LBS | HEIGHT: 63 IN

## 2022-05-19 NOTE — PROGRESS NOTES
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 Duckworth reports ongoing multifocal joint pain, low-grade in character, and improved with activity.  She reports maintaining a vigorous physical activity schedule with daily gardening despite the pain.  Physical exam shows osteoarthritis changes in the right significantly more so than in the left hand; fist formation and  strength are normal.    Laboratory evaluation in 2021 revealed hemoglobin of 10.3; urinalysis was clear; creatinine was 1.11 with a GFR of 58.  In 2021, creatinine was 1.3.    Systemic lupus erythematosus with secondary Sjogren's remains quiescent. I recommend continuing Plaquenil monotherapy 400 mg daily. While on the medication, patient should have annual ophthalmology evaluation for retinal toxicity. Most recent exam per her report was conducted in 2021:    Plan:  1.  Continue Plaquenil 400 mg daily.  While on Plaquenil, undergo annual ophthalmology evaluation for rare retinal toxicity.  2.  Repeat urinalysis, complete blood count, and creatinine, C3, C4, and ds DNA in next few weeks.  3.  Continue acetaminophen 1000 mg to 2000 mg daily for residual joint pain.  4.  Continue aerobic exercise daily to maintain joint and muscle health.  5. Continue symptomatic treatment for mild sicca and dry eye symptoms.  6. Agree that weight loss, diet and exercise will all help with joint symptoms      # L leg swelling: No further swelling, however she is having migratory pain that is working its way up her leg; doppler ultrasounds of the left lower leg to rule out thrombosis in  were negative. Physical exam shows no joint  "space pathology. Trochanteric pain syndrome is most likely due to bursitis/tenosynovitis/muscle pain and will likely improve spontaneosly over time. She was advised to continue use of tylenol and tramadol for pain and exercise/weight loss.  I recommend heat 20 minutes twice daily, gentle stretching and exercises for the lateral thigh.  Corticosteroid injection could be supplied if improvement is not occurring steadily in the next 3 to 4 weeks. Use acetaminophen 500 to 1000 mg up to 3 times daily for joint pain.      # Osteoarthritis, thumbs:  No indication for immunomodulatory therapy.  # Ulnar neuropathy, L;  Symptoms reflect post-operative change, following L elbow replacement in 2018. Stable.  #Advanced dental caries and periodontitis: I emphasized the importance of dental hygiene to control of systemic inflammatory symptoms.  I recommended evaluation in dentistry.    Return to clinic in 6 months.     Orders:  No orders of the defined types were placed in this encounter.    Follow-up: Return in about 6 months (around 3/10/2022)    Deng Pruett M.D.  Staff Rheumatologist,  Health  Pager [phone]      HPI:   Shu Duckworth has a history including systemic lupus erythematosus with secondary Sjögren's syndrome (+sicca symptoms, +Schirmer test, -minor salivary gland biopsy) who presents for follow-up. The patient was last evaluated in 9-2021 in follow up of lupus. Symptoms were overall judged reasonably controlled. Plaquenil was continued at 400 mg a day.    Interval history May 20, 2022    She notes aching joint/muscle pain, mostly in the legs. Pain is most noticeable when walking on uneven ground and going upstairs.  She has resumed gardening this season; she \"works up a sweat\" with outdoor activity for 20 minutes.  Joints are sore in the morning for ~ 30 minutes; moist heat helps.    No foamy or frothy urine.  No bowel or bladder incontinence.  No change in strength or sensation in the arms or legs. + " raynauds, stable.  No skin thickening or tightening consistent with sclerodactyly.  No symptoms consistent with dactylitis.  No red hot or swollen joints.  some joint pain.  some joint stiffness.    Dry eye mouth symtpoms are stable.  She has cataracts and will have surgery in summer 2022.  She notes gradually worsening cough (X > 3 years).    Interval history 09-:  Patient was seen in ophthalmology on August 23, 2021.  Impression was of long-term use of Plaquenil; no evidence of retinal Plaquenil toxicity. She has been on plaquenil since 2010, 400 mg/day. Today she states she is having no lupus symptoms currently. She is still having osteoarthritis in her shoulders, hands and knees. She also states she is having generalized muscle and joint pain, however the muscle pain is worse in her legs and upper thighs, this pain started in the bottom of her left leg and has moved up over the past three months, it is sharp in character. She has no personal history of blood clots, but her son has had many blood clots. She says that she had her left shoulder replaced in June of 2021, however she says her right may need to be replaced soon. She has had both knees replaced and says they need to be replaced again. She says that it takes a couple of hours for her joints to loosen up. She is also having some neck pain that has gotten worse over the past year. Her sicca symptoms have been stable.    She says she has gained around 30 lbs back since COVID started due to her gym being shut down and not able to exercise as much.    No new or persistent headache.  Some new hair thinning.  No change in vision or hearing.  No inflammatory eye disease history.  No history of miscarriage.  No history of blood clots.  Some dry eyes or dry mouth.  No facial rash.  No photosensitive rash.  No personal or first-degree relatives with psoriasis.  No nasal or oral ulcers.  No recurrent epistaxis or hemoptysis.  No recurrent sinusitis or  recurrent pneumonia.  No chest pain, some shortness of breath (since 2010).  No abdominal pain, constipation, some diarrhea.  No blood in stool or black tarry stools.  No nausea or vomiting at baseline, sometimes nauseous with diarrhea.  No foamy or frothy urine.  No bowel or bladder incontinence.  No change in strength or sensation in the arms or legs. Some history of raynauds.  No skin thickening or tightening consistent with sclerodactyly.  No symptoms consistent with dactylitis.  No red hot or swollen joints.  some joint pain.  some joint stiffness.  Muscle pain, in both legs, problems with using her legs, some weakness, falling easily. No fevers, chills, weight loss or night sweats.      Review of Systems:   Pertinent items are noted in HPI, remainder of complete ROS is negative.    Chronic L top of foot pain.    Active Medications:   Current Outpatient Medications   Medication Sig     Acetaminophen (TYLENOL PO) Take 1,000 mg by mouth every 6 hours as needed for mild pain or fever     amphetamine-dextroamphetamine (ADDERALL) 20 MG per tablet Take 30 mg by mouth 2 times daily      Cholecalciferol (VITAMIN D3) 1000 units CAPS Take 1,000 Units by mouth daily     ClonAZEPAM (KLONOPIN) 0.5 MG tablet Take 1 tab during day and 2 at bedtime as needed (max of 3 tabs per 24 hours)     famotidine (PEPCID) 20 MG tablet Take 20 mg by mouth     fluticasone (FLONASE) 50 MCG/ACT nasal spray SPRAY 1 SPRAY INTO EACH NOSTRIL EVERY DAY     hydrochlorothiazide (HYDRODIURIL) 50 MG tablet Take 25 mg by mouth daily.     hydroxychloroquine (PLAQUENIL) 200 MG tablet TAKE 1 TABLET BY MOUTH TWICE A DAY     levothyroxine (SYNTHROID/LEVOTHROID) 100 MCG tablet Take 100 mcg by mouth daily     metoprolol succinate (TOPROL-XL) 50 MG 24 hr tablet Take 50 mg by mouth daily     Multiple Vitamins-Minerals (CENTRUM SILVER PO) Take  by mouth.     pantoprazole (PROTONIX) 40 MG enteric coated tablet Take 1 tablet by mouth 2 times daily     potassium  chloride ER (K-DUR/KLOR-CON M) 20 MEQ CR tablet TAKE 1 TABLET BY MOUTH EVERY DAY WITH FOOD     rOPINIRole (REQUIP) 1 MG tablet Take 1 mg in afternoon and 5 mg at bedtime     rOPINIRole (REQUIP) 5 MG tablet TAKE 1 TABLET BY MOUTH EVERY DAY IN THE EVENING     simvastatin (ZOCOR) 20 MG tablet Take one tab daily     traMADol (ULTRAM) 50 MG tablet Take 50 mg by mouth every 6 hours as needed for pain     triamcinolone (KENALOG) 0.1 % external cream APPLY A THIN LAYER TO THE AFFECTED AREA(S) BY TOPICAL ROUTE 3 TIMES PER DAY     venlafaxine (EFFEXOR-XR) 150 MG 24 hr capsule Take 150 mg by mouth daily     VIIBRYD 20 MG TABS tablet 20 mg by Oral or Feeding Tube route daily     atenolol (TENORMIN) 25 MG tablet 2 tablets daily. One in the morning, one at bedtime. (Patient not taking: No sig reported)     cyclobenzaprine (FLEXERIL) 10 MG tablet Take 1 tablet 3 times daily as needed (Patient not taking: No sig reported)     mometasone (NASONEX) 50 MCG/ACT nasal spray 1 spray in each nostril once daily (Patient not taking: No sig reported)     ranitidine (ZANTAC) 150 MG tablet Take one at bedtime daily (Patient not taking: No sig reported)     sertraline (ZOLOFT) 100 MG tablet Take 150 mg by mouth every morning (Patient not taking: No sig reported)     No current facility-administered medications for this visit.         Allergies:  Dust Mite Extract   Penicillins   Sulfa Drugs     Past Medical History:  Encounter for long-term current use of medication  Lymphocytic colitis   Renal insufficiency   Hypokalemia   Diastolic dysfunction   Tobacco use disorder   Shortness of breath   Sjögren's syndrome   Vitamin D deficiency   Systemic lupus erythematosus   Migraines   Overweight  Primary osteoarthritis   Esophageal reflux   Anemia, unspecified   Attention deficit disorder without mention of hyperactivity   Generalized anxiety disorder   Episodic Mood Disorder   Hypothyroid   Hyperlipidemia    Allergic rhinitis  seasonal    Hypertension     Past Surgical History:  Hysterectomy 1/1/1978 - 12/31/1978  ([place]) [place] Bone Graft Tib Fib FX W BMP 1/1/1992 - 12/31/1992    Bunionectomy, right   Knee replacement, left 1/1/2006 - 12/31/2006      Thumb(s) surgery  Knee replacement, right 6/1/2008 - 6/30/2008  Upper arm/elbow surgery 12/1/2008 - 12/31/2008       Colonoscopy screening 1/27/11   Bladder suspension 10/1/2005 - 10/31/2005       Colon surgery 6/1/2018 - 6/30/2018      Family History:    The patient's family history includes Sjogren's in her daughter; lupus (with more skin involvement) in her sister.  Son has extensive history of arterial blood clots    Social History:  The patient reports that she has quit smoking. She smoked 0.50 packs per day. She has never used smokeless tobacco. She reports current alcohol use. She reports that she does not use drugs. She is a retired .  PCP: Johnson Arnold MD  Marital Status:      Physical Exam:   /76   Pulse 79   Wt 93.3 kg (205 lb 11.2 oz)   SpO2 94%   Breastfeeding No   BMI 36.44 kg/m     Wt Readings from Last 4 Encounters:   05/20/22 93.3 kg (205 lb 11.2 oz)   09/10/21 92.7 kg (204 lb 6.4 oz)   08/24/21 93.4 kg (205 lb 14.6 oz)   06/08/21 93.4 kg (205 lb 14.4 oz)     Constitutional: Well-developed, appearing stated age; cooperative.  Eyes: Normal EOM, PERRLA, vision, conjunctiva, sclera.  ENT: Normal external ears, nose, hearing, lips, teeth, gums, normal saliva pool  Neck: No mass or thyroid enlargement.  Cardio: RRR, no murmurs rubs or gallops  Resp: lungs CTA bilaterally at bases of lungs, normal work of breathing  MS:  On the right hand, there is bony angulation in the knuckles more so than left hand, some swelling of the left wrist, Good range of motion of the wrist joint, no pain upon palpation of the joint space. Good fist formation. Elbows have normal extension and flexion, no effusion. Fatty changes on left knee secondary to knee replacement  surgery, normal ROM, no effusion. Hips have normal ROM bilaterally (interal, external rotation and flexion/extension), pain upon palpation of left leg around greater head of the trochanter.  Skin: No nail pitting, alopecia, rash, nodules or lesions.  Neuro: Normal cranial nerves  Psych: Normal judgement, orientation, memory, affect.     Imaging:    Laboratory:   RHEUM RESULTS Latest Ref Rng & Units 6/16/2010 7/21/2010 12/15/2010   ALBUMIN 3.3 - 4.9 g/dL - - -   ALT 0 - 50 U/L - - 25   AST 0 - 45 U/L - - 32   COMPLEMENT C3 76 - 169 mg/dL 114 - 118   COMPLEMENT C4 15 - 50 mg/dL 9(L) - 11(L)   CK TOTAL 30 - 225 U/L - - -   CREATININE 0.52 - 1.04 mg/dL 1.22(H) 1.08(H) 1.07(H)   CRP 0.0 - 8.0 mg/L 5.8 - <5.0   DNA <10 IU/mL 35(H) - <15   PEDRO 0 - 1.0 1.2(H) - -   GFR ESTIMATE, IF BLACK >60 mL/min/[1.73:m2] 54(L) 62 62   GFR ESTIMATE >60 mL/min/[1.73:m2] 45(L) 51(L) 52(L)   HEMATOCRIT 35.0 - 47.0 % 41.3 - 42.4   HEMOGLOBIN 12.0 - 16.0 g/dL 13.5 - 13.6   HEPBANG NEG - - -   HCVAB NEG - - -   WBC 4.0 - 11.0 10e9/L 8.1 - 7.6   RBC 3.8 - 5.2 10e12/L 4.47 - 4.67   RDW 10.0 - 15.0 % 15.1(H) - 15.0   MCHC 31.5 - 36.5 g/dL 32.7 - 32.1   MCV 78 - 100 fl 92 - 91   PLATELET COUNT 150 - 450 10e9/L 230 - 228   RHEUMATOID FACTOR 0 - 14 IU/mL 10 - -   ESR 0 - 30 mm/h - - 10     Rheumatoid Factor   Date Value Ref Range Status   06/16/2010 10 0 - 14 IU/mL Final     Ribonucleic Protein IgG Antibody   Date Value Ref Range Status   06/16/2010 0  Final     Comment:     Reference range: 0 to 40  Unit: AU/mL  (Note)  REFERENCE INTERVAL: Ribonucleic Protein (SAMANTHA), IgG   29 AU/mL or Less ............. Negative   30 - 40 AU/mL ................ Equivocal   41 AU/mL or Greater .......... Positive    RNP antibody is seen in % of mixed connective tissue  disease and is considered specific for this syndrome if  other antibodies are negative. RNP is also present in  20-30% of systemic lupus erythematosus (SLE) and 15-25%  of progressive systemic  sclerosis (PSS).  Performed by [facility],  [address] [facility] [place] [address] [phone]  [email or web address], Virgen Casillas MD, Lab. Director     Cordero Antibody IgG   Date Value Ref Range Status   06/16/2010 0  Final     Comment:     Reference range: 0 to 40  Unit: AU/mL  (Note)  REFERENCE INTERVALS: SMITH (SAMANTHA) Ab, IgG   29 AU/mL or Less ............. Negative   30 - 40 AU/mL ................ Equivocal   41 AU/mL or Greater .......... Positive    Cordero antibody is very specific for systemic lupus  erythematosus (SLE) but only occurs in 30-35% of SLE  cases. The presence of antibodies to Cordero is often  associated with renal disease.  Performed by [facility],  [address] [facility] [place] [address] [phone]  [email or web address], Virgen Casillas MD, Lab. Director     SSA (RO) Antibody IgG   Date Value Ref Range Status   06/16/2010 4  Final     Comment:     Reference range: 0 to 40  Unit: AU/mL  (Note)  REFERENCE INTERVALS: SSA (Ro) (SAMANTHA) Ab, IgG   29 AU/mL or Less ............. Negative   30 - 40 AU/mL ................ Equivocal   41 AU/mL or Greater .......... Positive    SSA (Ro) antibody is seen in 70-75% of Sjogren syndrome  cases, 30-40% of systemic lupus erythematosus (SLE) and  5-10% of progressive systemic sclerosis (PSS).  Performed by [facility],  [address] [facility],[place] [id number] [phone]  [email or web address], Virgen Casillas MD, Lab. Director     SSB (LA) Antibody IgG   Date Value Ref Range Status   06/16/2010 0  Final     Comment:     Reference range: 0 to 40  Unit: AU/mL  (Note)  REFERENCE INTERVALS: SSB (La) (SAMANTHA) Ab, IgG   29 AU/mL or Less ............. Negative   30 - 40 AU/mL ................ Equivocal   41 AU/mL or Greater .......... Positive    SSB (La) antibody is seen in 50-60% of Sjogren syndrome  cases and is specific if it is the only SAMANTHA antibody  present. 15-25% of patients with systemic lupus  erythematosus (SLE) and 5-10% of patients with progressive  systemic sclerosis  (PSS) also have this antibody.  Performed by [facility],  [address], [facility] [place] [address] [phone]  [email or web address], Virgen Casillas MD, Lab. Director     KATIE Screen by [facility]   Date Value Ref Range Status   2010 1.2 (H) 0 - 1.0 Final     Comment:     Interpretation:  Weakly Positive     DNA-ds   Date Value Ref Range Status   10/04/2018 1 <10 IU/mL Final     Comment:     Negative     Hep B Surface Agn   Date Value Ref Range Status   10/12/2011 Negative NEG Final     Albumin Fraction   Date Value Ref Range Status   2016 4.1 3.7 - 5.1 g/dL Final     Alpha 2 Fraction   Date Value Ref Range Status   2016 0.9 0.5 - 0.9 g/dL Final     Beta Fraction   Date Value Ref Range Status   2016 0.8 0.6 - 1.0 g/dL Final     Gamma Fraction   Date Value Ref Range Status   2016 0.8 0.7 - 1.6 g/dL Final     Monoclonal Peak   Date Value Ref Range Status   2016 0.0 0.0 g/dL Final     ELP Interpretation:   Date Value Ref Range Status   2016   Final    Essentially normal electrophoretic pattern.  No monoclonal protein seen.   Pathologic significance requires clinical correlation.  ISIDRO Murguia M.D.,   Ph.D., Pathologist ().       [facility]  Rheumatology Clinic  Deng Pruett MD  2022     Name: Shu Duckworth  MRN: [id number]  Age: 76 year old  : 1945  Referring provider: Referred Self    Assessment and Plan:  # Systemic lupus erythematosus with secondary Sjögren's syndrome (malar rash, arthralgia, sicca symptoms, +KATIE, +dsDNA, hypocomplementemia, +Schirmer's test):  Mrs. Duckworth reports ongoing diffuse joint pain, low-grade in character, and improved with activity.  She reports maintaining of vigorous physical activity schedule with daily gardening despite the pain.  Physical exam shows osteoarthritis changes in the right significantly more so than in the left hand fist formation and  strength are normal.    Laboratory evaluation in 2021 revealed  hemoglobin of 10.3; urinalysis was clear; creatinine was 1.11 with a GFR of 58.    Systemic lupus erythematosus is quiescent. I recommend continuing Plaquenil monotherapy 400 mg daily. While on the medication, patient should have annual ophthalmology evaluation for retinal toxicity. Most recent exam per her report was conducted in August 2021:    Plan:  1.  Continue Plaquenil 400 mg daily.  While on Plaquenil, undergo annual ophthalmology evaluation for rare retinal toxicity.  2.  Repeat urinalysis, complete blood count, and creatinine at your convenience.  3.  Continue acetaminophen 1000 mg to 2000 mg daily for residual joint pain.  4.  Continue aerobic exercise daily to maintain joint and muscle health.  5. Continue symptomatic treatment for mild sicca and dry eye symptoms.  6. Agree that weight loss, diet and exercise will all help with joint symptoms      # L leg swelling: No further swelling, however she is having migratory pain that is working its way up her leg; doppler ultrasounds of the left lower leg to rule out thrombosis in 12-19 were negative. Physical exam shows no joint space pathology. Trochanteric pain syndrome is most likely due to bursitis/tenosynovitis/muscle pain and will likely improve spontaneosly over time. She was advised to continue use of tylenol and tramadol for pain and exercise/weight loss.  I recommend heat 20 minutes twice daily, gentle stretching and exercises for the lateral thigh.  Corticosteroid injection could be supplied if improvement is not occurring steadily in the next 3 to 4 weeks. Use acetaminophen 500 to 1000 mg up to 3 times daily for joint pain.      # Osteoarthritis, thumbs:  No indication for immunomodulatory therapy.  # Ulnar neuropathy, L;  Symptoms reflect post-operative change, following L elbow replacement in 2018. Stable.  #Advanced dental caries and periodontitis: I emphasized the importance of dental hygiene to control of systemic inflammatory symptoms.  I  recommended evaluation in dentistry.    Return to clinic in 6 months.     Orders:  No orders of the defined types were placed in this encounter.    Follow-up: Return in about 6 months (around 3/10/2022)    I was present with the medical student who participated in the service and in the documentation of the note. I have verified the history and personally performed the physical exam and medical decision making. I agree with the assessment and plan of care as documented in the note.    Deng Pruett M.D.  Staff Rheumatologist, [facility]  Pager [phone]      HPI:   Shu Duckworth has a history including systemic lupus erythematosus with secondary Sjögren's syndrome (+sicca symptoms, +Schirmer test, -minor salivary gland biopsy) who presents for follow-up. The patient was last evaluated in 6-2020 in follow up of lupus. Symptoms were overall judged reasonably controlled on Plaquenil. Plaquenil was continued at 400 mg a day.    Interval history 09-:  Patient was seen in ophthalmology on August 23, 2021.  Impression was of long-term use of Plaquenil; no evidence of retinal Plaquenil toxicity. She has been on plaquenil since 2010, 400 mg/day. Today she states she is having no lupus symptoms currently. She is still having osteoarthritis in her shoulders, hands and knees. She also states she is having generalized muscle and joint pain, however the muscle pain is worse in her legs and upper thighs, this pain started in the bottom of her left leg and has moved up over the past three months, it is sharp in character. She has no personal history of blood clots, but her son has had many blood clots. She says that she had her left shoulder replaced in June of 2021, however she says her right may need to be replaced soon. She has had both knees replaced and says they need to be replaced again. She says that it takes a couple of hours for her joints to loosen up. She is also having some neck pain that has gotten worse  over the past year. Her sicca symptoms have been stable.    She says she has gained around 30 lbs back since COVID started due to her gym being shut down and not able to exercise as much.      No new or persistent headache.  Some new hair thinning.  No change in vision or hearing.  No inflammatory eye disease history.  No history of miscarriage.  No history of blood clots.  Some dry eyes or dry mouth.  No facial rash.  No photosensitive rash.  No personal or first-degree relatives with psoriasis.  No nasal or oral ulcers.  No recurrent epistaxis or hemoptysis.  No recurrent sinusitis or recurrent pneumonia.  No chest pain, some shortness of breath (since 2010).  No abdominal pain, constipation, some diarrhea.  No blood in stool or black tarry stools.  No nausea or vomiting at baseline, sometimes nauseous with diarrhea.  No foamy or frothy urine.  No bowel or bladder incontinence.  No change in strength or sensation in the arms or legs. Some history of raynauds.  No skin thickening or tightening consistent with sclerodactyly.  No symptoms consistent with dactylitis.  No red hot or swollen joints.  some joint pain.  some joint stiffness.  Muscle pain, in both legs, problems with using her legs, some weakness, falling easily. No fevers, chills, weight loss or night sweats.      Interval history 06-:    She continues with diffuse pain; the sides of her feet, muscles, and joints.   She notes swelling in her R hand, constant, not affected by activity.     She has been gardening frequently; she is able to grasp and pull weeds. She opens jars fine.  She takes acetaminopehn 500 mg 2-4 tabs per day. +significant relief from joint pain, temporary.    She notes some balance/insteadiness with getting up from a chair, or with rapid turning. No vertigo; minimal dizziness normally.       Review of Systems:   Pertinent items are noted in HPI, remainder of complete ROS is negative.        Active Medications:   Current  Outpatient Medications   Medication     Acetaminophen (TYLENOL PO)     amphetamine-dextroamphetamine (ADDERALL) 20 MG per tablet     Cholecalciferol (VITAMIN D3) 1000 units CAPS     ClonAZEPAM (KLONOPIN) 0.5 MG tablet     famotidine (PEPCID) 20 MG tablet     fluticasone (FLONASE) 50 MCG/ACT nasal spray     hydrochlorothiazide (HYDRODIURIL) 50 MG tablet     hydroxychloroquine (PLAQUENIL) 200 MG tablet     levothyroxine (SYNTHROID/LEVOTHROID) 100 MCG tablet     metoprolol succinate (TOPROL-XL) 50 MG 24 hr tablet     Multiple Vitamins-Minerals (CENTRUM SILVER PO)     pantoprazole (PROTONIX) 40 MG enteric coated tablet     potassium chloride ER (K-DUR/KLOR-CON M) 20 MEQ CR tablet     rOPINIRole (REQUIP) 1 MG tablet     rOPINIRole (REQUIP) 5 MG tablet     simvastatin (ZOCOR) 20 MG tablet     traMADol (ULTRAM) 50 MG tablet     triamcinolone (KENALOG) 0.1 % external cream     venlafaxine (EFFEXOR-XR) 150 MG 24 hr capsule     VIIBRYD 20 MG TABS tablet     atenolol (TENORMIN) 25 MG tablet     cyclobenzaprine (FLEXERIL) 10 MG tablet     mometasone (NASONEX) 50 MCG/ACT nasal spray     ranitidine (ZANTAC) 150 MG tablet     sertraline (ZOLOFT) 100 MG tablet     No current facility-administered medications for this visit.       Allergies:  Dust Mite Extract   Penicillins   Sulfa Drugs     Past Medical History:  Encounter for long-term current use of medication  Lymphocytic colitis   Renal insufficiency   Hypokalemia   Diastolic dysfunction   Tobacco use disorder   Shortness of breath   Sjögren's syndrome   Vitamin D deficiency   Systemic lupus erythematosus   Migraines   Overweight  Primary osteoarthritis   Esophageal reflux   Anemia, unspecified   Attention deficit disorder without mention of hyperactivity   Generalized anxiety disorder   Episodic Mood Disorder   Hypothyroid   Hyperlipidemia    Allergic rhinitis seasonal    Hypertension     Past Surgical History:  Hysterectomy 1/1/1978 - 12/31/1978  ([place]) DE Bone Graft Tib Fib FX  W BMP 1/1/1992 - 12/31/1992    Bunionectomy, right   Knee replacement, left 1/1/2006 - 12/31/2006      Thumb(s) surgery  Knee replacement, right 6/1/2008 - 6/30/2008  Upper arm/elbow surgery 12/1/2008 - 12/31/2008       Colonoscopy screening 1/27/11   Bladder suspension 10/1/2005 - 10/31/2005       Colon surgery 6/1/2018 - 6/30/2018      Family History:    The patient's family history includes Sjogren's in her daughter; lupus (with more skin involvement) in her sister.  Son has extensive history of arterial blood clots    Social History:  The patient reports that she has quit smoking. She smoked 0.50 packs per day. She has never used smokeless tobacco. She reports current alcohol use. She reports that she does not use drugs. She is a retired .  PCP: Johnson Arnold MD  Marital Status:      Physical Exam:   /76   Pulse 79   Wt 93.3 kg (205 lb 11.2 oz)   SpO2 94%   Breastfeeding No   BMI 36.44 kg/m     Wt Readings from Last 4 Encounters:   05/20/22 93.3 kg (205 lb 11.2 oz)   09/10/21 92.7 kg (204 lb 6.4 oz)   08/24/21 93.4 kg (205 lb 14.6 oz)   06/08/21 93.4 kg (205 lb 14.4 oz)     Constitutional: Well-developed, appearing stated age; cooperative.  Eyes: Normal EOM, PERRLA, vision, conjunctiva, sclera.  ENT: Normal external ears, nose, hearing, lips, teeth, gums, normal saliva pool  Neck: No mass or thyroid enlargement.  Cardio: RRR, no murmurs rubs or gallops  Resp: lungs CTA bilaterally at bases of lungs, normal work of breathing  MS:  On the right hand, there is bony angulation in the knuckles more so than left hand, some swelling of the left wrist, Good range of motion of the wrist joint, no pain upon palpation of the joint space. Good fist formation. Elbows have normal extension and flexion, no effusion. Fatty changes on left knee secondary to knee replacement surgery, normal ROM, no effusion. Hips have normal ROM bilaterally (interal, external rotation and flexion/extension),  pain upon palpation of left leg around greater head of the trochanter.  Skin: No nail pitting, alopecia, rash, nodules or lesions.  Neuro: Normal cranial nerves  Psych: Normal judgement, orientation, memory, affect.     Imaging:    Laboratory:   RHEUM RESULTS Latest Ref Rng & Units 6/16/2010 7/21/2010 12/15/2010   ALBUMIN 3.3 - 4.9 g/dL - - -   ALT 0 - 50 U/L - - 25   AST 0 - 45 U/L - - 32   COMPLEMENT C3 76 - 169 mg/dL 114 - 118   COMPLEMENT C4 15 - 50 mg/dL 9(L) - 11(L)   CK TOTAL 30 - 225 U/L - - -   CREATININE 0.52 - 1.04 mg/dL 1.22(H) 1.08(H) 1.07(H)   CRP 0.0 - 8.0 mg/L 5.8 - <5.0   DNA <10 IU/mL 35(H) - <15   PEDRO 0 - 1.0 1.2(H) - -   GFR ESTIMATE, IF BLACK >60 mL/min/[1.73:m2] 54(L) 62 62   GFR ESTIMATE >60 mL/min/[1.73:m2] 45(L) 51(L) 52(L)   HEMATOCRIT 35.0 - 47.0 % 41.3 - 42.4   HEMOGLOBIN 12.0 - 16.0 g/dL 13.5 - 13.6   HEPBANG NEG - - -   HCVAB NEG - - -   WBC 4.0 - 11.0 10e9/L 8.1 - 7.6   RBC 3.8 - 5.2 10e12/L 4.47 - 4.67   RDW 10.0 - 15.0 % 15.1(H) - 15.0   MCHC 31.5 - 36.5 g/dL 32.7 - 32.1   MCV 78 - 100 fl 92 - 91   PLATELET COUNT 150 - 450 10e9/L 230 - 228   RHEUMATOID FACTOR 0 - 14 IU/mL 10 - -   ESR 0 - 30 mm/h - - 10     Rheumatoid Factor   Date Value Ref Range Status   06/16/2010 10 0 - 14 IU/mL Final     Ribonucleic Protein IgG Antibody   Date Value Ref Range Status   06/16/2010 0  Final     Comment:     Reference range: 0 to 40  Unit: AU/mL  (Note)  REFERENCE INTERVAL: Ribonucleic Protein (SAMANTHA), IgG   29 AU/mL or Less ............. Negative   30 - 40 AU/mL ................ Equivocal   41 AU/mL or Greater .......... Positive    RNP antibody is seen in % of mixed connective tissue  disease and is considered specific for this syndrome if  other antibodies are negative. RNP is also present in  20-30% of systemic lupus erythematosus (SLE) and 15-25%  of progressive systemic sclerosis (PSS).  Performed by [facility],  [address] [place] [id number] [phone]  [email or web address]), Virgen  ANANDA Casillas MD, Lab. Director     Cordero Antibody IgG   Date Value Ref Range Status   06/16/2010 0  Final     Comment:     Reference range: 0 to 40  Unit: AU/mL  (Note)  REFERENCE INTERVALS: SMITH (SAMANTHA) Ab, IgG   29 AU/mL or Less ............. Negative   30 - 40 AU/mL ................ Equivocal   41 AU/mL or Greater .......... Positive    Cordero antibody is very specific for systemic lupus  erythematosus (SLE) but only occurs in 30-35% of SLE  cases. The presence of antibodies to Cordero is often  associated with renal disease.  Performed by [facility],  [address] [facility],[place] [id number] [phone]  [email or web address], Virgen Casillas MD, Lab. Director     SSA (RO) Antibody IgG   Date Value Ref Range Status   06/16/2010 4  Final     Comment:     Reference range: 0 to 40  Unit: AU/mL  (Note)  REFERENCE INTERVALS: SSA (Ro) (SAMANTHA) Ab, IgG   29 AU/mL or Less ............. Negative   30 - 40 AU/mL ................ Equivocal   41 AU/mL or Greater .......... Positive    SSA (Ro) antibody is seen in 70-75% of Sjogren syndrome  cases, 30-40% of systemic lupus erythematosus (SLE) and  5-10% of progressive systemic sclerosis (PSS).  Performed by [facility],  [address] [facility],[place] [id number] [phone]  [email or web address], Virgen Casillas MD, Lab. Director     SSB (LA) Antibody IgG   Date Value Ref Range Status   06/16/2010 0  Final     Comment:     Reference range: 0 to 40  Unit: AU/mL  (Note)  REFERENCE INTERVALS: SSB (La) (SAMANTHA) Ab, IgG   29 AU/mL or Less ............. Negative   30 - 40 AU/mL ................ Equivocal   41 AU/mL or Greater .......... Positive    SSB (La) antibody is seen in 50-60% of Sjogren syndrome  cases and is specific if it is the only SAMANTAH antibody  present. 15-25% of patients with systemic lupus  erythematosus (SLE) and 5-10% of patients with progressive  systemic sclerosis (PSS) also have this antibody.  Performed by [facility],  [address] [place] [id number]  [phone]  [email or web address], Virgen Casillas MD, Lab. Director     KATIE Screen by [facility]   Date Value Ref Range Status   06/16/2010 1.2 (H) 0 - 1.0 Final     Comment:     Interpretation:  Weakly Positive     DNA-ds   Date Value Ref Range Status   10/04/2018 1 <10 IU/mL Final     Comment:     Negative     Hep B Surface Agn   Date Value Ref Range Status   10/12/2011 Negative NEG Final     Albumin Fraction   Date Value Ref Range Status   12/22/2016 4.1 3.7 - 5.1 g/dL Final     Alpha 2 Fraction   Date Value Ref Range Status   12/22/2016 0.9 0.5 - 0.9 g/dL Final     Beta Fraction   Date Value Ref Range Status   12/22/2016 0.8 0.6 - 1.0 g/dL Final     Gamma Fraction   Date Value Ref Range Status   12/22/2016 0.8 0.7 - 1.6 g/dL Final     Monoclonal Peak   Date Value Ref Range Status   12/22/2016 0.0 0.0 g/dL Final     ELP Interpretation:   Date Value Ref Range Status   12/22/2016   Final    Essentially normal electrophoretic pattern.  No monoclonal protein seen.   Pathologic significance requires clinical correlation.  ISIDRO Murguia M.D.,   Ph.D., Pathologist ().

## 2022-05-20 ENCOUNTER — OFFICE VISIT (OUTPATIENT)
Dept: RHEUMATOLOGY | Facility: CLINIC | Age: 77
End: 2022-05-20
Attending: INTERNAL MEDICINE
Payer: COMMERCIAL

## 2022-05-20 VITALS
WEIGHT: 205.7 LBS | DIASTOLIC BLOOD PRESSURE: 76 MMHG | OXYGEN SATURATION: 94 % | BODY MASS INDEX: 36.44 KG/M2 | HEART RATE: 79 BPM | SYSTOLIC BLOOD PRESSURE: 124 MMHG

## 2022-05-20 DIAGNOSIS — M32.9 SYSTEMIC LUPUS ERYTHEMATOSUS, UNSPECIFIED SLE TYPE, UNSPECIFIED ORGAN INVOLVEMENT STATUS (H): ICD-10-CM

## 2022-05-20 PROCEDURE — 99214 OFFICE O/P EST MOD 30 MIN: CPT | Performed by: INTERNAL MEDICINE

## 2022-05-20 RX ORDER — HYDROXYCHLOROQUINE SULFATE 200 MG/1
TABLET, FILM COATED ORAL
Qty: 180 TABLET | Refills: 3 | Status: SHIPPED | OUTPATIENT
Start: 2022-05-20 | End: 2023-08-09

## 2022-05-20 NOTE — LETTER
2022       RE: Shu Duckworth  [address] [facility] [address]     Dear Colleague,    Thank you for referring your patient, Shu Duckworth, to the [facility] RHEUMATOLOGY CLINIC [place] at [facility]. Please see a copy of my visit note below.    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 Duckworth reports ongoing multifocal joint pain, low-grade in character, and improved with activity.  She reports maintaining a vigorous physical activity schedule with daily gardening despite the pain.  Physical exam shows osteoarthritis changes in the right significantly more so than in the left hand; fist formation and  strength are normal.    Laboratory evaluation in 2021 revealed hemoglobin of 10.3; urinalysis was clear; creatinine was 1.11 with a GFR of 58.  In 2021, creatinine was 1.3.    Systemic lupus erythematosus with secondary Sjogren's remains quiescent. I recommend continuing Plaquenil monotherapy 400 mg daily. While on the medication, patient should have annual ophthalmology evaluation for retinal toxicity. Most recent exam per her report was conducted in 2021:    Plan:  1.  Continue Plaquenil 400 mg daily.  While on Plaquenil, undergo annual ophthalmology evaluation for rare retinal toxicity.  2.  Repeat urinalysis, complete blood count, and creatinine, C3, C4, and ds DNA in next few weeks.  3.  Continue acetaminophen 1000 mg to 2000 mg daily for residual joint pain.  4.  Continue aerobic exercise daily to maintain joint and muscle health.  5. Continue symptomatic treatment for mild sicca and dry eye  symptoms.  6. Agree that weight loss, diet and exercise will all help with joint symptoms      # L leg swelling: No further swelling, however she is having migratory pain that is working its way up her leg; doppler ultrasounds of the left lower leg to rule out thrombosis in 12-19 were negative. Physical exam shows no joint space pathology. Trochanteric pain syndrome is most likely due to bursitis/tenosynovitis/muscle pain and will likely improve spontaneosly over time. She was advised to continue use of tylenol and tramadol for pain and exercise/weight loss.  I recommend heat 20 minutes twice daily, gentle stretching and exercises for the lateral thigh.  Corticosteroid injection could be supplied if improvement is not occurring steadily in the next 3 to 4 weeks. Use acetaminophen 500 to 1000 mg up to 3 times daily for joint pain.      # Osteoarthritis, thumbs:  No indication for immunomodulatory therapy.  # Ulnar neuropathy, L;  Symptoms reflect post-operative change, following L elbow replacement in 2018. Stable.  #Advanced dental caries and periodontitis: I emphasized the importance of dental hygiene to control of systemic inflammatory symptoms.  I recommended evaluation in dentistry.    Return to clinic in 6 months.     Orders:  No orders of the defined types were placed in this encounter.    Follow-up: Return in about 6 months (around 3/10/2022)    Deng Pruett M.D.  Staff Rheumatologist,  Health  Pager [phone]      HPI:   Shu Duckworth has a history including systemic lupus erythematosus with secondary Sjögren's syndrome (+sicca symptoms, +Schirmer test, -minor salivary gland biopsy) who presents for follow-up. The patient was last evaluated in 9-2021 in follow up of lupus. Symptoms were overall judged reasonably controlled. Plaquenil was continued at 400 mg a day.    Interval history May 20, 2022    She notes aching joint/muscle pain, mostly in the legs. Pain is most noticeable when walking on uneven  "ground and going upstairs.  She has resumed gardening this season; she \"works up a sweat\" with outdoor activity for 20 minutes.  Joints are sore in the morning for ~ 30 minutes; moist heat helps.    No foamy or frothy urine.  No bowel or bladder incontinence.  No change in strength or sensation in the arms or legs. + raynauds, stable.  No skin thickening or tightening consistent with sclerodactyly.  No symptoms consistent with dactylitis.  No red hot or swollen joints.  some joint pain.  some joint stiffness.    Dry eye mouth symtpoms are stable.  She has cataracts and will have surgery in summer 2022.  She notes gradually worsening cough (X > 3 years).    Interval history 09-:  Patient was seen in ophthalmology on August 23, 2021.  Impression was of long-term use of Plaquenil; no evidence of retinal Plaquenil toxicity. She has been on plaquenil since 2010, 400 mg/day. Today she states she is having no lupus symptoms currently. She is still having osteoarthritis in her shoulders, hands and knees. She also states she is having generalized muscle and joint pain, however the muscle pain is worse in her legs and upper thighs, this pain started in the bottom of her left leg and has moved up over the past three months, it is sharp in character. She has no personal history of blood clots, but her son has had many blood clots. She says that she had her left shoulder replaced in June of 2021, however she says her right may need to be replaced soon. She has had both knees replaced and says they need to be replaced again. She says that it takes a couple of hours for her joints to loosen up. She is also having some neck pain that has gotten worse over the past year. Her sicca symptoms have been stable.    She says she has gained around 30 lbs back since COVID started due to her gym being shut down and not able to exercise as much.    No new or persistent headache.  Some new hair thinning.  No change in vision or hearing. "  No inflammatory eye disease history.  No history of miscarriage.  No history of blood clots.  Some dry eyes or dry mouth.  No facial rash.  No photosensitive rash.  No personal or first-degree relatives with psoriasis.  No nasal or oral ulcers.  No recurrent epistaxis or hemoptysis.  No recurrent sinusitis or recurrent pneumonia.  No chest pain, some shortness of breath (since 2010).  No abdominal pain, constipation, some diarrhea.  No blood in stool or black tarry stools.  No nausea or vomiting at baseline, sometimes nauseous with diarrhea.  No foamy or frothy urine.  No bowel or bladder incontinence.  No change in strength or sensation in the arms or legs. Some history of raynauds.  No skin thickening or tightening consistent with sclerodactyly.  No symptoms consistent with dactylitis.  No red hot or swollen joints.  some joint pain.  some joint stiffness.  Muscle pain, in both legs, problems with using her legs, some weakness, falling easily. No fevers, chills, weight loss or night sweats.      Review of Systems:   Pertinent items are noted in HPI, remainder of complete ROS is negative.    Chronic L top of foot pain.    Active Medications:   Current Outpatient Medications   Medication Sig     Acetaminophen (TYLENOL PO) Take 1,000 mg by mouth every 6 hours as needed for mild pain or fever     amphetamine-dextroamphetamine (ADDERALL) 20 MG per tablet Take 30 mg by mouth 2 times daily      Cholecalciferol (VITAMIN D3) 1000 units CAPS Take 1,000 Units by mouth daily     ClonAZEPAM (KLONOPIN) 0.5 MG tablet Take 1 tab during day and 2 at bedtime as needed (max of 3 tabs per 24 hours)     famotidine (PEPCID) 20 MG tablet Take 20 mg by mouth     fluticasone (FLONASE) 50 MCG/ACT nasal spray SPRAY 1 SPRAY INTO EACH NOSTRIL EVERY DAY     hydrochlorothiazide (HYDRODIURIL) 50 MG tablet Take 25 mg by mouth daily.     hydroxychloroquine (PLAQUENIL) 200 MG tablet TAKE 1 TABLET BY MOUTH TWICE A DAY     levothyroxine  (SYNTHROID/LEVOTHROID) 100 MCG tablet Take 100 mcg by mouth daily     metoprolol succinate (TOPROL-XL) 50 MG 24 hr tablet Take 50 mg by mouth daily     Multiple Vitamins-Minerals (CENTRUM SILVER PO) Take  by mouth.     pantoprazole (PROTONIX) 40 MG enteric coated tablet Take 1 tablet by mouth 2 times daily     potassium chloride ER (K-DUR/KLOR-CON M) 20 MEQ CR tablet TAKE 1 TABLET BY MOUTH EVERY DAY WITH FOOD     rOPINIRole (REQUIP) 1 MG tablet Take 1 mg in afternoon and 5 mg at bedtime     rOPINIRole (REQUIP) 5 MG tablet TAKE 1 TABLET BY MOUTH EVERY DAY IN THE EVENING     simvastatin (ZOCOR) 20 MG tablet Take one tab daily     traMADol (ULTRAM) 50 MG tablet Take 50 mg by mouth every 6 hours as needed for pain     triamcinolone (KENALOG) 0.1 % external cream APPLY A THIN LAYER TO THE AFFECTED AREA(S) BY TOPICAL ROUTE 3 TIMES PER DAY     venlafaxine (EFFEXOR-XR) 150 MG 24 hr capsule Take 150 mg by mouth daily     VIIBRYD 20 MG TABS tablet 20 mg by Oral or Feeding Tube route daily     atenolol (TENORMIN) 25 MG tablet 2 tablets daily. One in the morning, one at bedtime. (Patient not taking: No sig reported)     cyclobenzaprine (FLEXERIL) 10 MG tablet Take 1 tablet 3 times daily as needed (Patient not taking: No sig reported)     mometasone (NASONEX) 50 MCG/ACT nasal spray 1 spray in each nostril once daily (Patient not taking: No sig reported)     ranitidine (ZANTAC) 150 MG tablet Take one at bedtime daily (Patient not taking: No sig reported)     sertraline (ZOLOFT) 100 MG tablet Take 150 mg by mouth every morning (Patient not taking: No sig reported)     No current facility-administered medications for this visit.         Allergies:  Dust Mite Extract   Penicillins   Sulfa Drugs     Past Medical History:  Encounter for long-term current use of medication  Lymphocytic colitis   Renal insufficiency   Hypokalemia   Diastolic dysfunction   Tobacco use disorder   Shortness of breath   Sjögren's syndrome   Vitamin D  deficiency   Systemic lupus erythematosus   Migraines   Overweight  Primary osteoarthritis   Esophageal reflux   Anemia, unspecified   Attention deficit disorder without mention of hyperactivity   Generalized anxiety disorder   Episodic Mood Disorder   Hypothyroid   Hyperlipidemia    Allergic rhinitis seasonal    Hypertension     Past Surgical History:  Hysterectomy 1/1/1978 - 12/31/1978  ([place]) [place] Bone Graft Tib Fib FX W BMP 1/1/1992 - 12/31/1992    Bunionectomy, right   Knee replacement, left 1/1/2006 - 12/31/2006      Thumb(s) surgery  Knee replacement, right 6/1/2008 - 6/30/2008  Upper arm/elbow surgery 12/1/2008 - 12/31/2008       Colonoscopy screening 1/27/11   Bladder suspension 10/1/2005 - 10/31/2005       Colon surgery 6/1/2018 - 6/30/2018      Family History:    The patient's family history includes Sjogren's in her daughter; lupus (with more skin involvement) in her sister.  Son has extensive history of arterial blood clots    Social History:  The patient reports that she has quit smoking. She smoked 0.50 packs per day. She has never used smokeless tobacco. She reports current alcohol use. She reports that she does not use drugs. She is a retired .  PCP: Johnson Arnold MD  Marital Status:      Physical Exam:   /76   Pulse 79   Wt 93.3 kg (205 lb 11.2 oz)   SpO2 94%   Breastfeeding No   BMI 36.44 kg/m     Wt Readings from Last 4 Encounters:   05/20/22 93.3 kg (205 lb 11.2 oz)   09/10/21 92.7 kg (204 lb 6.4 oz)   08/24/21 93.4 kg (205 lb 14.6 oz)   06/08/21 93.4 kg (205 lb 14.4 oz)     Constitutional: Well-developed, appearing stated age; cooperative.  Eyes: Normal EOM, PERRLA, vision, conjunctiva, sclera.  ENT: Normal external ears, nose, hearing, lips, teeth, gums, normal saliva pool  Neck: No mass or thyroid enlargement.  Cardio: RRR, no murmurs rubs or gallops  Resp: lungs CTA bilaterally at bases of lungs, normal work of breathing  MS:  On the right hand, there is bony  angulation in the knuckles more so than left hand, some swelling of the left wrist, Good range of motion of the wrist joint, no pain upon palpation of the joint space. Good fist formation. Elbows have normal extension and flexion, no effusion. Fatty changes on left knee secondary to knee replacement surgery, normal ROM, no effusion. Hips have normal ROM bilaterally (interal, external rotation and flexion/extension), pain upon palpation of left leg around greater head of the trochanter.  Skin: No nail pitting, alopecia, rash, nodules or lesions.  Neuro: Normal cranial nerves  Psych: Normal judgement, orientation, memory, affect.     Imaging:    Laboratory:   RHEUM RESULTS Latest Ref Rng & Units 6/16/2010 7/21/2010 12/15/2010   ALBUMIN 3.3 - 4.9 g/dL - - -   ALT 0 - 50 U/L - - 25   AST 0 - 45 U/L - - 32   COMPLEMENT C3 76 - 169 mg/dL 114 - 118   COMPLEMENT C4 15 - 50 mg/dL 9(L) - 11(L)   CK TOTAL 30 - 225 U/L - - -   CREATININE 0.52 - 1.04 mg/dL 1.22(H) 1.08(H) 1.07(H)   CRP 0.0 - 8.0 mg/L 5.8 - <5.0   DNA <10 IU/mL 35(H) - <15   PEDRO 0 - 1.0 1.2(H) - -   GFR ESTIMATE, IF BLACK >60 mL/min/[1.73:m2] 54(L) 62 62   GFR ESTIMATE >60 mL/min/[1.73:m2] 45(L) 51(L) 52(L)   HEMATOCRIT 35.0 - 47.0 % 41.3 - 42.4   HEMOGLOBIN 12.0 - 16.0 g/dL 13.5 - 13.6   HEPBANG NEG - - -   HCVAB NEG - - -   WBC 4.0 - 11.0 10e9/L 8.1 - 7.6   RBC 3.8 - 5.2 10e12/L 4.47 - 4.67   RDW 10.0 - 15.0 % 15.1(H) - 15.0   MCHC 31.5 - 36.5 g/dL 32.7 - 32.1   MCV 78 - 100 fl 92 - 91   PLATELET COUNT 150 - 450 10e9/L 230 - 228   RHEUMATOID FACTOR 0 - 14 IU/mL 10 - -   ESR 0 - 30 mm/h - - 10     Rheumatoid Factor   Date Value Ref Range Status   06/16/2010 10 0 - 14 IU/mL Final     Ribonucleic Protein IgG Antibody   Date Value Ref Range Status   06/16/2010 0  Final     Comment:     Reference range: 0 to 40  Unit: AU/mL  (Note)  REFERENCE INTERVAL: Ribonucleic Protein (SAMANTHA), IgG   29 AU/mL or Less ............. Negative   30 - 40 AU/mL ................  Equivocal   41 AU/mL or Greater .......... Positive    RNP antibody is seen in % of mixed connective tissue  disease and is considered specific for this syndrome if  other antibodies are negative. RNP is also present in  20-30% of systemic lupus erythematosus (SLE) and 15-25%  of progressive systemic sclerosis (PSS).  Performed by [facility],  [address] [place] [address] [phone]  [email or web address], Virgen Casillas MD, Lab. Director     Cordero Antibody IgG   Date Value Ref Range Status   06/16/2010 0  Final     Comment:     Reference range: 0 to 40  Unit: AU/mL  (Note)  REFERENCE INTERVALS: SMITH (SAMANTHA) Ab, IgG   29 AU/mL or Less ............. Negative   30 - 40 AU/mL ................ Equivocal   41 AU/mL or Greater .......... Positive    Cordero antibody is very specific for systemic lupus  erythematosus (SLE) but only occurs in 30-35% of SLE  cases. The presence of antibodies to Cordero is often  associated with renal disease.  Performed by [facility],  [address] [place] [address] [phone]  [email or web address], Virgen Casillas MD, Lab. Director     SSA (RO) Antibody IgG   Date Value Ref Range Status   06/16/2010 4  Final     Comment:     Reference range: 0 to 40  Unit: AU/mL  (Note)  REFERENCE INTERVALS: SSA (Ro) (SAMANTHA) Ab, IgG   29 AU/mL or Less ............. Negative   30 - 40 AU/mL ................ Equivocal   41 AU/mL or Greater .......... Positive    SSA (Ro) antibody is seen in 70-75% of Sjogren syndrome  cases, 30-40% of systemic lupus erythematosus (SLE) and  5-10% of progressive systemic sclerosis (PSS).  Performed by [facility],  [address] [facility] [place] [address] [phone]  [email or web address], Virgen Casillas MD, Lab. Director     SSB (LA) Antibody IgG   Date Value Ref Range Status   06/16/2010 0  Final     Comment:     Reference range: 0 to 40  Unit: AU/mL  (Note)  REFERENCE INTERVALS: SSB (La) (SAMANTHA) Ab, IgG   29 AU/mL or Less ............. Negative   30 - 40 AU/mL  ................ Equivocal   41 AU/mL or Greater .......... Positive    SSB (La) antibody is seen in 50-60% of Sjogren syndrome  cases and is specific if it is the only SAMANTHA antibody  present. 15-25% of patients with systemic lupus  erythematosus (SLE) and 5-10% of patients with progressive  systemic sclerosis (PSS) also have this antibody.  Performed by [facility] [employer] [facility],  [address] [place] [id number] [phone]  [email or web address] Virgen Casillas MD, Lab. Director     KATIE Screen by [facility]   Date Value Ref Range Status   2010 1.2 (H) 0 - 1.0 Final     Comment:     Interpretation:  Weakly Positive     DNA-ds   Date Value Ref Range Status   10/04/2018 1 <10 IU/mL Final     Comment:     Negative     Hep B Surface Agn   Date Value Ref Range Status   10/12/2011 Negative NEG Final     Albumin Fraction   Date Value Ref Range Status   2016 4.1 3.7 - 5.1 g/dL Final     Alpha 2 Fraction   Date Value Ref Range Status   2016 0.9 0.5 - 0.9 g/dL Final     Beta Fraction   Date Value Ref Range Status   2016 0.8 0.6 - 1.0 g/dL Final     Gamma Fraction   Date Value Ref Range Status   2016 0.8 0.7 - 1.6 g/dL Final     Monoclonal Peak   Date Value Ref Range Status   2016 0.0 0.0 g/dL Final     ELP Interpretation:   Date Value Ref Range Status   2016   Final    Essentially normal electrophoretic pattern.  No monoclonal protein seen.   Pathologic significance requires clinical correlation.  ISIDRO Murguia M.D.,   Ph.D., Pathologist ().       [facility]  Rheumatology Clinic  Deng Pruett MD  2022     Name: Shu Duckworth  MRN: [id number]  Age: 76 year old  : 1945  Referring provider: Referred Self    Assessment and Plan:  # Systemic lupus erythematosus with secondary Sjögren's syndrome (malar rash, arthralgia, sicca symptoms, +KATIE, +dsDNA, hypocomplementemia, +Schirmer's test):  Mrs. Duckworth reports ongoing diffuse joint pain, low-grade in character, and improved  with activity.  She reports maintaining of vigorous physical activity schedule with daily gardening despite the pain.  Physical exam shows osteoarthritis changes in the right significantly more so than in the left hand fist formation and  strength are normal.    Laboratory evaluation in June 2021 revealed hemoglobin of 10.3; urinalysis was clear; creatinine was 1.11 with a GFR of 58.    Systemic lupus erythematosus is quiescent. I recommend continuing Plaquenil monotherapy 400 mg daily. While on the medication, patient should have annual ophthalmology evaluation for retinal toxicity. Most recent exam per her report was conducted in August 2021:    Plan:  1.  Continue Plaquenil 400 mg daily.  While on Plaquenil, undergo annual ophthalmology evaluation for rare retinal toxicity.  2.  Repeat urinalysis, complete blood count, and creatinine at your convenience.  3.  Continue acetaminophen 1000 mg to 2000 mg daily for residual joint pain.  4.  Continue aerobic exercise daily to maintain joint and muscle health.  5. Continue symptomatic treatment for mild sicca and dry eye symptoms.  6. Agree that weight loss, diet and exercise will all help with joint symptoms      # L leg swelling: No further swelling, however she is having migratory pain that is working its way up her leg; doppler ultrasounds of the left lower leg to rule out thrombosis in 12-19 were negative. Physical exam shows no joint space pathology. Trochanteric pain syndrome is most likely due to bursitis/tenosynovitis/muscle pain and will likely improve spontaneosly over time. She was advised to continue use of tylenol and tramadol for pain and exercise/weight loss.  I recommend heat 20 minutes twice daily, gentle stretching and exercises for the lateral thigh.  Corticosteroid injection could be supplied if improvement is not occurring steadily in the next 3 to 4 weeks. Use acetaminophen 500 to 1000 mg up to 3 times daily for joint pain.      #  Osteoarthritis, thumbs:  No indication for immunomodulatory therapy.  # Ulnar neuropathy, L;  Symptoms reflect post-operative change, following L elbow replacement in 2018. Stable.  #Advanced dental caries and periodontitis: I emphasized the importance of dental hygiene to control of systemic inflammatory symptoms.  I recommended evaluation in dentistry.    Return to clinic in 6 months.     Orders:  No orders of the defined types were placed in this encounter.    Follow-up: Return in about 6 months (around 3/10/2022)    I was present with the medical student who participated in the service and in the documentation of the note. I have verified the history and personally performed the physical exam and medical decision making. I agree with the assessment and plan of care as documented in the note.    Deng Pruett M.D.  Staff Rheumatologist, [facility]  Pager [phone]      HPI:   Shu Duckworth has a history including systemic lupus erythematosus with secondary Sjögren's syndrome (+sicca symptoms, +Schirmer test, -minor salivary gland biopsy) who presents for follow-up. The patient was last evaluated in 6-2020 in follow up of lupus. Symptoms were overall judged reasonably controlled on Plaquenil. Plaquenil was continued at 400 mg a day.    Interval history 09-:  Patient was seen in ophthalmology on August 23, 2021.  Impression was of long-term use of Plaquenil; no evidence of retinal Plaquenil toxicity. She has been on plaquenil since 2010, 400 mg/day. Today she states she is having no lupus symptoms currently. She is still having osteoarthritis in her shoulders, hands and knees. She also states she is having generalized muscle and joint pain, however the muscle pain is worse in her legs and upper thighs, this pain started in the bottom of her left leg and has moved up over the past three months, it is sharp in character. She has no personal history of blood clots, but her son has had many blood clots. She  says that she had her left shoulder replaced in June of 2021, however she says her right may need to be replaced soon. She has had both knees replaced and says they need to be replaced again. She says that it takes a couple of hours for her joints to loosen up. She is also having some neck pain that has gotten worse over the past year. Her sicca symptoms have been stable.    She says she has gained around 30 lbs back since COVID started due to her gym being shut down and not able to exercise as much.      No new or persistent headache.  Some new hair thinning.  No change in vision or hearing.  No inflammatory eye disease history.  No history of miscarriage.  No history of blood clots.  Some dry eyes or dry mouth.  No facial rash.  No photosensitive rash.  No personal or first-degree relatives with psoriasis.  No nasal or oral ulcers.  No recurrent epistaxis or hemoptysis.  No recurrent sinusitis or recurrent pneumonia.  No chest pain, some shortness of breath (since 2010).  No abdominal pain, constipation, some diarrhea.  No blood in stool or black tarry stools.  No nausea or vomiting at baseline, sometimes nauseous with diarrhea.  No foamy or frothy urine.  No bowel or bladder incontinence.  No change in strength or sensation in the arms or legs. Some history of raynauds.  No skin thickening or tightening consistent with sclerodactyly.  No symptoms consistent with dactylitis.  No red hot or swollen joints.  some joint pain.  some joint stiffness.  Muscle pain, in both legs, problems with using her legs, some weakness, falling easily. No fevers, chills, weight loss or night sweats.      Interval history 06-:    She continues with diffuse pain; the sides of her feet, muscles, and joints.   She notes swelling in her R hand, constant, not affected by activity.     She has been gardening frequently; she is able to grasp and pull weeds. She opens jars fine.  She takes acetaminopehn 500 mg 2-4 tabs per day.  +significant relief from joint pain, temporary.    She notes some balance/insteadiness with getting up from a chair, or with rapid turning. No vertigo; minimal dizziness normally.       Review of Systems:   Pertinent items are noted in HPI, remainder of complete ROS is negative.        Active Medications:   Current Outpatient Medications   Medication     Acetaminophen (TYLENOL PO)     amphetamine-dextroamphetamine (ADDERALL) 20 MG per tablet     Cholecalciferol (VITAMIN D3) 1000 units CAPS     ClonAZEPAM (KLONOPIN) 0.5 MG tablet     famotidine (PEPCID) 20 MG tablet     fluticasone (FLONASE) 50 MCG/ACT nasal spray     hydrochlorothiazide (HYDRODIURIL) 50 MG tablet     hydroxychloroquine (PLAQUENIL) 200 MG tablet     levothyroxine (SYNTHROID/LEVOTHROID) 100 MCG tablet     metoprolol succinate (TOPROL-XL) 50 MG 24 hr tablet     Multiple Vitamins-Minerals (CENTRUM SILVER PO)     pantoprazole (PROTONIX) 40 MG enteric coated tablet     potassium chloride ER (K-DUR/KLOR-CON M) 20 MEQ CR tablet     rOPINIRole (REQUIP) 1 MG tablet     rOPINIRole (REQUIP) 5 MG tablet     simvastatin (ZOCOR) 20 MG tablet     traMADol (ULTRAM) 50 MG tablet     triamcinolone (KENALOG) 0.1 % external cream     venlafaxine (EFFEXOR-XR) 150 MG 24 hr capsule     VIIBRYD 20 MG TABS tablet     atenolol (TENORMIN) 25 MG tablet     cyclobenzaprine (FLEXERIL) 10 MG tablet     mometasone (NASONEX) 50 MCG/ACT nasal spray     ranitidine (ZANTAC) 150 MG tablet     sertraline (ZOLOFT) 100 MG tablet     No current facility-administered medications for this visit.       Allergies:  Dust Mite Extract   Penicillins   Sulfa Drugs     Past Medical History:  Encounter for long-term current use of medication  Lymphocytic colitis   Renal insufficiency   Hypokalemia   Diastolic dysfunction   Tobacco use disorder   Shortness of breath   Sjögren's syndrome   Vitamin D deficiency   Systemic lupus erythematosus   Migraines   Overweight  Primary osteoarthritis   Esophageal  reflux   Anemia, unspecified   Attention deficit disorder without mention of hyperactivity   Generalized anxiety disorder   Episodic Mood Disorder   Hypothyroid   Hyperlipidemia    Allergic rhinitis seasonal    Hypertension     Past Surgical History:  Hysterectomy 1/1/1978 - 12/31/1978  ([place]) [place] Bone Graft Tib Fib FX W BMP 1/1/1992 - 12/31/1992    Bunionectomy, right   Knee replacement, left 1/1/2006 - 12/31/2006      Thumb(s) surgery  Knee replacement, right 6/1/2008 - 6/30/2008  Upper arm/elbow surgery 12/1/2008 - 12/31/2008       Colonoscopy screening 1/27/11   Bladder suspension 10/1/2005 - 10/31/2005       Colon surgery 6/1/2018 - 6/30/2018      Family History:    The patient's family history includes Sjogren's in her daughter; lupus (with more skin involvement) in her sister.  Son has extensive history of arterial blood clots    Social History:  The patient reports that she has quit smoking. She smoked 0.50 packs per day. She has never used smokeless tobacco. She reports current alcohol use. She reports that she does not use drugs. She is a retired .  PCP: Johnson Arnold MD  Marital Status:      Physical Exam:   /76   Pulse 79   Wt 93.3 kg (205 lb 11.2 oz)   SpO2 94%   Breastfeeding No   BMI 36.44 kg/m     Wt Readings from Last 4 Encounters:   05/20/22 93.3 kg (205 lb 11.2 oz)   09/10/21 92.7 kg (204 lb 6.4 oz)   08/24/21 93.4 kg (205 lb 14.6 oz)   06/08/21 93.4 kg (205 lb 14.4 oz)     Constitutional: Well-developed, appearing stated age; cooperative.  Eyes: Normal EOM, PERRLA, vision, conjunctiva, sclera.  ENT: Normal external ears, nose, hearing, lips, teeth, gums, normal saliva pool  Neck: No mass or thyroid enlargement.  Cardio: RRR, no murmurs rubs or gallops  Resp: lungs CTA bilaterally at bases of lungs, normal work of breathing  MS:  On the right hand, there is bony angulation in the knuckles more so than left hand, some swelling of the left wrist, Good range of  motion of the wrist joint, no pain upon palpation of the joint space. Good fist formation. Elbows have normal extension and flexion, no effusion. Fatty changes on left knee secondary to knee replacement surgery, normal ROM, no effusion. Hips have normal ROM bilaterally (interal, external rotation and flexion/extension), pain upon palpation of left leg around greater head of the trochanter.  Skin: No nail pitting, alopecia, rash, nodules or lesions.  Neuro: Normal cranial nerves  Psych: Normal judgement, orientation, memory, affect.     Imaging:    Laboratory:   RHEUM RESULTS Latest Ref Rng & Units 6/16/2010 7/21/2010 12/15/2010   ALBUMIN 3.3 - 4.9 g/dL - - -   ALT 0 - 50 U/L - - 25   AST 0 - 45 U/L - - 32   COMPLEMENT C3 76 - 169 mg/dL 114 - 118   COMPLEMENT C4 15 - 50 mg/dL 9(L) - 11(L)   CK TOTAL 30 - 225 U/L - - -   CREATININE 0.52 - 1.04 mg/dL 1.22(H) 1.08(H) 1.07(H)   CRP 0.0 - 8.0 mg/L 5.8 - <5.0   DNA <10 IU/mL 35(H) - <15   PEDRO 0 - 1.0 1.2(H) - -   GFR ESTIMATE, IF BLACK >60 mL/min/[1.73:m2] 54(L) 62 62   GFR ESTIMATE >60 mL/min/[1.73:m2] 45(L) 51(L) 52(L)   HEMATOCRIT 35.0 - 47.0 % 41.3 - 42.4   HEMOGLOBIN 12.0 - 16.0 g/dL 13.5 - 13.6   HEPBANG NEG - - -   HCVAB NEG - - -   WBC 4.0 - 11.0 10e9/L 8.1 - 7.6   RBC 3.8 - 5.2 10e12/L 4.47 - 4.67   RDW 10.0 - 15.0 % 15.1(H) - 15.0   MCHC 31.5 - 36.5 g/dL 32.7 - 32.1   MCV 78 - 100 fl 92 - 91   PLATELET COUNT 150 - 450 10e9/L 230 - 228   RHEUMATOID FACTOR 0 - 14 IU/mL 10 - -   ESR 0 - 30 mm/h - - 10     Rheumatoid Factor   Date Value Ref Range Status   06/16/2010 10 0 - 14 IU/mL Final     Ribonucleic Protein IgG Antibody   Date Value Ref Range Status   06/16/2010 0  Final     Comment:     Reference range: 0 to 40  Unit: AU/mL  (Note)  REFERENCE INTERVAL: Ribonucleic Protein (SAMANTHA), IgG   29 AU/mL or Less ............. Negative   30 - 40 AU/mL ................ Equivocal   41 AU/mL or Greater .......... Positive    RNP antibody is seen in % of mixed connective  tissue  disease and is considered specific for this syndrome if  other antibodies are negative. RNP is also present in  20-30% of systemic lupus erythematosus (SLE) and 15-25%  of progressive systemic sclerosis (PSS).  Performed by [facility],  [address] [facility],[place] [id number] [phone]  www.[facility], Virgen Casillas MD, Lab. Director     Cordero Antibody IgG   Date Value Ref Range Status   06/16/2010 0  Final     Comment:     Reference range: 0 to 40  Unit: AU/mL  (Note)  REFERENCE INTERVALS: SMITH (SAMANTHA) Ab, IgG   29 AU/mL or Less ............. Negative   30 - 40 AU/mL ................ Equivocal   41 AU/mL or Greater .......... Positive    Cordero antibody is very specific for systemic lupus  erythematosus (SLE) but only occurs in 30-35% of SLE  cases. The presence of antibodies to Cordero is often  associated with renal disease.  Performed by [facility],  [address] [facility],[place] [id number] [phone]  www.[facility], Virgen Casillas MD, Lab. Director     SSA (RO) Antibody IgG   Date Value Ref Range Status   06/16/2010 4  Final     Comment:     Reference range: 0 to 40  Unit: AU/mL  (Note)  REFERENCE INTERVALS: SSA (Ro) (SAMANTHA) Ab, IgG   29 AU/mL or Less ............. Negative   30 - 40 AU/mL ................ Equivocal   41 AU/mL or Greater .......... Positive    SSA (Ro) antibody is seen in 70-75% of Sjogren syndrome  cases, 30-40% of systemic lupus erythematosus (SLE) and  5-10% of progressive systemic sclerosis (PSS).  Performed by [facility],  [address] [facility],[place] [id number] [phone]  www.[facility], Virgen Casillas MD, Lab. Director     SSB (LA) Antibody IgG   Date Value Ref Range Status   06/16/2010 0  Final     Comment:     Reference range: 0 to 40  Unit: AU/mL  (Note)  REFERENCE INTERVALS: SSB (La) (SAMANTHA) Ab, IgG   29 AU/mL or Less ............. Negative   30 - 40 AU/mL ................ Equivocal   41 AU/mL or Greater .......... Positive    SSB (La) antibody is seen in 50-60% of  Sjogren syndrome  cases and is specific if it is the only SAMANTHA antibody  present. 15-25% of patients with systemic lupus  erythematosus (SLE) and 5-10% of patients with progressive  systemic sclerosis (PSS) also have this antibody.  Performed by [facility],  [facility] [place] [facility] [place] [address] [phone]  www.[email or web address], Virgen Casillas MD, Lab. Director     KATIE Screen by [facility]   Date Value Ref Range Status   06/16/2010 1.2 (H) 0 - 1.0 Final     Comment:     Interpretation:  Weakly Positive     DNA-ds   Date Value Ref Range Status   10/04/2018 1 <10 IU/mL Final     Comment:     Negative     Hep B Surface Agn   Date Value Ref Range Status   10/12/2011 Negative NEG Final     Albumin Fraction   Date Value Ref Range Status   12/22/2016 4.1 3.7 - 5.1 g/dL Final     Alpha 2 Fraction   Date Value Ref Range Status   12/22/2016 0.9 0.5 - 0.9 g/dL Final     Beta Fraction   Date Value Ref Range Status   12/22/2016 0.8 0.6 - 1.0 g/dL Final     Gamma Fraction   Date Value Ref Range Status   12/22/2016 0.8 0.7 - 1.6 g/dL Final     Monoclonal Peak   Date Value Ref Range Status   12/22/2016 0.0 0.0 g/dL Final     ELP Interpretation:   Date Value Ref Range Status   12/22/2016   Final    Essentially normal electrophoretic pattern.  No monoclonal protein seen.   Pathologic significance requires clinical correlation.  ISIDRO Murguia M.D.,   Ph.D., Pathologist ().         Again, thank you for allowing me to participate in the care of your patient.      Sincerely,    Deng Pruett MD

## 2022-05-20 NOTE — PATIENT INSTRUCTIONS
Diagnosis:  1.  Systemic lupus erythematosus: symptoms of lupus are generally well controlled. I recommend no change in daily Plaquenil dose. Plan hydroxychloroquine 400 mg daily. Annual eye exams to monitor for toxicity. Bloodwork as soon as possible.  2.  Osteoarthritis, hands (R more than L), wrists, shoulders, knees. Continue regular physical exercise.  3.  Dental caries: Lower tooth capping/crowns are planned. Continue symptomatic treatment for Sjogren's syndrome with frequent drinking, artificial tears, etc.      4.  Continue aerobic exercise daily to maintain joint and muscle health.  5. Agree that weight loss, diet and exercise will all help with joint pain/stiffness symptoms

## 2022-06-05 ENCOUNTER — HEALTH MAINTENANCE LETTER (OUTPATIENT)
Age: 77
End: 2022-06-05

## 2022-06-09 ENCOUNTER — LAB (OUTPATIENT)
Dept: LAB | Facility: CLINIC | Age: 77
End: 2022-06-09
Payer: COMMERCIAL

## 2022-06-09 DIAGNOSIS — M32.9 SYSTEMIC LUPUS ERYTHEMATOSUS, UNSPECIFIED SLE TYPE, UNSPECIFIED ORGAN INVOLVEMENT STATUS (H): ICD-10-CM

## 2022-06-09 LAB
C3 SERPL-MCNC: 125 MG/DL (ref 83–177)
C4 SERPL-MCNC: 13 MG/DL (ref 19–59)

## 2022-06-09 PROCEDURE — 86225 DNA ANTIBODY NATIVE: CPT

## 2022-06-09 PROCEDURE — 86160 COMPLEMENT ANTIGEN: CPT

## 2022-06-09 PROCEDURE — 36415 COLL VENOUS BLD VENIPUNCTURE: CPT

## 2022-06-09 PROCEDURE — 86160 COMPLEMENT ANTIGEN: CPT | Mod: 59

## 2022-06-13 LAB — DSDNA AB SER-ACNC: 0.9 IU/ML

## 2022-10-15 ENCOUNTER — HEALTH MAINTENANCE LETTER (OUTPATIENT)
Age: 77
End: 2022-10-15

## 2023-06-01 ENCOUNTER — HEALTH MAINTENANCE LETTER (OUTPATIENT)
Age: 78
End: 2023-06-01

## 2023-08-05 DIAGNOSIS — M32.9 SYSTEMIC LUPUS ERYTHEMATOSUS, UNSPECIFIED SLE TYPE, UNSPECIFIED ORGAN INVOLVEMENT STATUS (H): ICD-10-CM

## 2023-08-09 RX ORDER — HYDROXYCHLOROQUINE SULFATE 200 MG/1
TABLET, FILM COATED ORAL
Qty: 60 TABLET | Refills: 0 | Status: SHIPPED | OUTPATIENT
Start: 2023-08-09 | End: 2023-09-13

## 2023-08-09 NOTE — TELEPHONE ENCOUNTER
HYDROXYCHLOROQUINE 200 MG TAB      Last Written Prescription Date:  5-20-22  Last Fill Quantity: 180,   # refills: 3  Last Office Visit : 5-20-22  Future Office visit:  9-22-23  Last eye exam: 8-23-21 ( epic note 8-24-21)      9-14-21 outside lab:  CREATININE 0.57 - 1.11 mg/dL 1.39 High       Creatinine   Date Value Ref Range Status   06/29/2020 1.04 0.52 - 1.04 mg/dL Final       Routing refill request to provider for review/approval because:  Lab overdue:  appt , lab eye exam/plaquenil screening  NCV: 9-22-23

## 2023-09-13 DIAGNOSIS — M32.9 SYSTEMIC LUPUS ERYTHEMATOSUS, UNSPECIFIED SLE TYPE, UNSPECIFIED ORGAN INVOLVEMENT STATUS (H): ICD-10-CM

## 2023-09-14 RX ORDER — HYDROXYCHLOROQUINE SULFATE 200 MG/1
TABLET, FILM COATED ORAL
Qty: 60 TABLET | Refills: 0 | Status: SHIPPED | OUTPATIENT
Start: 2023-09-14 | End: 2023-09-22

## 2023-09-14 NOTE — TELEPHONE ENCOUNTER
Medication/Dose: hydroxychloroquine (PLAQUENIL) 200 MG tablet    Last Written : 8/9/23  Last Quantity: 60, # refills: 0  Last Office Visit :  5/20/22  Pending appointment:     Sep 22, 2023  1:30 PM  (Arrive by 1:15 PM)  Return Visit with Deng Pruett MD  [facility] [place] ([facility] ) [address] [place] [id number] [phone]     Last Eye Exam: 8/23/21 at [facility]  (Reviewed Plaquenil Flow sheet, Care Everywhere, EMR, and Media.)  Pending Eye exam: Unknown    Creatinine   Date Value Ref Range Status   06/29/2020 1.04 0.52 - 1.04 mg/dL Final        Routed to Rheumatology team for further review as pt is overdue for Plaquenil eye screening.    SUNIL AvendañoN, RN  [facility] Refill Team

## 2023-09-22 ENCOUNTER — LAB (OUTPATIENT)
Dept: LAB | Facility: CLINIC | Age: 78
End: 2023-09-22
Payer: COMMERCIAL

## 2023-09-22 ENCOUNTER — OFFICE VISIT (OUTPATIENT)
Dept: RHEUMATOLOGY | Facility: CLINIC | Age: 78
End: 2023-09-22
Attending: INTERNAL MEDICINE
Payer: COMMERCIAL

## 2023-09-22 VITALS
OXYGEN SATURATION: 98 % | DIASTOLIC BLOOD PRESSURE: 86 MMHG | BODY MASS INDEX: 34.37 KG/M2 | WEIGHT: 194 LBS | SYSTOLIC BLOOD PRESSURE: 132 MMHG | HEART RATE: 74 BPM

## 2023-09-22 DIAGNOSIS — M79.671 FOOT PAIN, BILATERAL: ICD-10-CM

## 2023-09-22 DIAGNOSIS — M79.672 FOOT PAIN, BILATERAL: ICD-10-CM

## 2023-09-22 DIAGNOSIS — M32.9 SYSTEMIC LUPUS ERYTHEMATOSUS, UNSPECIFIED SLE TYPE, UNSPECIFIED ORGAN INVOLVEMENT STATUS (H): Primary | ICD-10-CM

## 2023-09-22 DIAGNOSIS — M32.9 SYSTEMIC LUPUS ERYTHEMATOSUS, UNSPECIFIED SLE TYPE, UNSPECIFIED ORGAN INVOLVEMENT STATUS (H): ICD-10-CM

## 2023-09-22 LAB
ALBUMIN UR-MCNC: 10 MG/DL
APPEARANCE UR: CLEAR
BILIRUB UR QL STRIP: NEGATIVE
COLOR UR AUTO: ABNORMAL
CREAT SERPL-MCNC: 1.15 MG/DL (ref 0.51–0.95)
EGFRCR SERPLBLD CKD-EPI 2021: 49 ML/MIN/1.73M2
ERYTHROCYTE [DISTWIDTH] IN BLOOD BY AUTOMATED COUNT: 13.2 % (ref 10–15)
GLUCOSE UR STRIP-MCNC: NEGATIVE MG/DL
HCT VFR BLD AUTO: 39.6 % (ref 35–47)
HGB BLD-MCNC: 12.5 G/DL (ref 11.7–15.7)
HGB UR QL STRIP: NEGATIVE
KETONES UR STRIP-MCNC: NEGATIVE MG/DL
LEUKOCYTE ESTERASE UR QL STRIP: ABNORMAL
MCH RBC QN AUTO: 30.3 PG (ref 26.5–33)
MCHC RBC AUTO-ENTMCNC: 31.6 G/DL (ref 31.5–36.5)
MCV RBC AUTO: 96 FL (ref 78–100)
MUCOUS THREADS #/AREA URNS LPF: PRESENT /LPF
NITRATE UR QL: NEGATIVE
PH UR STRIP: 5.5 [PH] (ref 5–7)
PLATELET # BLD AUTO: 193 10E3/UL (ref 150–450)
RBC # BLD AUTO: 4.13 10E6/UL (ref 3.8–5.2)
RBC URINE: 1 /HPF
SP GR UR STRIP: 1.02 (ref 1–1.03)
SQUAMOUS EPITHELIAL: <1 /HPF
UROBILINOGEN UR STRIP-MCNC: NORMAL MG/DL
WBC # BLD AUTO: 5.4 10E3/UL (ref 4–11)
WBC URINE: 2 /HPF

## 2023-09-22 PROCEDURE — 86160 COMPLEMENT ANTIGEN: CPT | Performed by: INTERNAL MEDICINE

## 2023-09-22 PROCEDURE — 99214 OFFICE O/P EST MOD 30 MIN: CPT | Performed by: INTERNAL MEDICINE

## 2023-09-22 PROCEDURE — G0463 HOSPITAL OUTPT CLINIC VISIT: HCPCS | Performed by: INTERNAL MEDICINE

## 2023-09-22 PROCEDURE — 85027 COMPLETE CBC AUTOMATED: CPT | Performed by: PATHOLOGY

## 2023-09-22 PROCEDURE — 86225 DNA ANTIBODY NATIVE: CPT | Performed by: INTERNAL MEDICINE

## 2023-09-22 PROCEDURE — 81001 URINALYSIS AUTO W/SCOPE: CPT | Performed by: PATHOLOGY

## 2023-09-22 PROCEDURE — 99000 SPECIMEN HANDLING OFFICE-LAB: CPT | Performed by: PATHOLOGY

## 2023-09-22 PROCEDURE — 86431 RHEUMATOID FACTOR QUANT: CPT | Performed by: INTERNAL MEDICINE

## 2023-09-22 PROCEDURE — 86200 CCP ANTIBODY: CPT | Performed by: INTERNAL MEDICINE

## 2023-09-22 PROCEDURE — 36415 COLL VENOUS BLD VENIPUNCTURE: CPT | Performed by: PATHOLOGY

## 2023-09-22 PROCEDURE — 82565 ASSAY OF CREATININE: CPT | Performed by: PATHOLOGY

## 2023-09-22 RX ORDER — HYDROXYCHLOROQUINE SULFATE 200 MG/1
TABLET, FILM COATED ORAL
Qty: 180 TABLET | Refills: 3 | Status: SHIPPED | OUTPATIENT
Start: 2023-09-22 | End: 2024-06-14

## 2023-09-22 RX ORDER — VENLAFAXINE 75 MG/1
75 TABLET ORAL 3 TIMES DAILY
COMMUNITY

## 2023-09-22 ASSESSMENT — PAIN SCALES - GENERAL: PAINLEVEL: MILD PAIN (3)

## 2023-09-22 NOTE — LETTER
2023       RE: Shu Duckworth  [address] [facility] [address]     Dear Colleague,    Thank you for referring your patient, Shu Duckworth, to the [facility] RHEUMATOLOGY CLINIC [place] at [facility]. Please see a copy of my visit note below.    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 Duckworth reports ongoing multifocal joint pain that is most noticeable in her hands, wrists, and feet (cathy L), but does not prevent her from gardening, painting, etc. She has ~1-2 hours of morning stiffness. Recently had increased symptoms after going a few days without her Plaquenil. Physical exam shows osteoarthritis changes in the right significantly more so than in the left hand; fist formation and  strength are normal. Increased splay of the phalanges of the R foot and prominence of the 5th MTP on that foot.    Data: In 2022, complement C3 and C4 were normal except for slightly decreased C4 13; double-stranded DNA was negative.  Xray L foot 23: Comminuted mildly displaced fracture involving the distal shaft of the fifth metatarsal. Advanced arthritic change at the first metatarsophalangeal joint. Displaced fracture through the base of the proximal phalanx left third toe, age indeterminate.     Discussion: Systemic lupus erythematosus with secondary Sjogren's are generally well controlled. However, small joint symptoms and morning stiffness are potentially concerning for concurrent RA. Anatomic changes of the R foot may indicate previous inflammatory arthritis. I recommend continuing Plaquenil monotherapy 400 mg daily, as  this does appear to be having a positive impact on patient's symptoms. While on the medication, patient should have annual ophthalmology evaluation for retinal toxicity. Will also get xrays of the feet to better characterize anatomic changes and repeat rheum screening flabs.     # Osteoarthritis, hands (R>L), wrists, shoulders, and knees:  No indication for immunomodulatory therapy. Continue regular physical exercise.    # Advanced dental caries and periodontitis: Currently following with dentistry. Drinks a lot of water and using Biotin to control symptoms.     Plan  1.  Continue hydroxychloroquine 400 mg daily with annual eye exams to monitor for toxicity and bloodwork as soon as possible.  2.  Osteoarthritis, hands (R more than L), wrists, shoulders, knees. Continue regular physical exercise.  3.  Dental caries: Lower tooth capping/crowns are planned. Continue symptomatic treatment for Sjogren's syndrome with frequent drinking, artificial tears, etc.  4.  Continue aerobic exercise daily to maintain joint and muscle health.  5. Agree that weight loss, diet and exercise will all help with joint pain/stiffness symptoms    # Leg swelling: Being followed by her PCP. Currently trialing Lasix.     # Ulnar neuropathy, L;  Did not discuss today      Return to clinic in 6 months.     Orders:  Orders Placed This Encounter   Procedures    X-ray bl Foot 3+ views    Complement C3    Complement C4    DNA double stranded antibodies    CBC with platelets    Routine UA with Micro Reflex to Culture    Rheumatoid factor    Cyclic Citrullinated Peptide Antibody IgG     Follow-up: Return in about 6 months (around 3/10/2022)    I was present with the medical student who took the history and acted as a scribe. I have verified the history, reviewed imaging and laboratory data, and personally performed the physical exam. I formulated the assessment and plan.    Deng Pruett M.D.  Staff Rheumatologist,  Health  Pager  [phone]        HPI:   Shu Duckworth has a history including systemic lupus erythematosus with secondary Sjögren's syndrome (+sicca symptoms, +Schirmer test, -minor salivary gland biopsy) who presents for follow-up. The patient was last evaluated in 5-2022 in follow up of lupus. Symptoms were overall judged reasonably controlled. Plaquenil was continued at 400 mg a day.    Interval history September 22, 2023  Patient has been doing OK recently. She continues to have aching pain in her hands and feet. Since summer 2022, she has noticed that the tips of the toes of her L foot are very sensitive---sometimes she cannot even wear a sock on that food. She also has some plantar fascia pain in that foot. She has a couple hours of morning stiffness every day, but says that the worst of her pain is after she has been active. Yesterday, woke up with a trigger finger in the 3rd digit of her L hand which resolved on its own after an hour or so. She also recently had an increase in symptoms for a few days while she was off of her Plaquenil after forgetting to refill her prescription. She takes 1-2 Tramadol and usually 1-2 Tylenol per day to manage her pain. Using her electric blanket is also helpful when she is cold.     Patient reports occasional rash over the bridge of her nose and cheeks. This rash is light red, flat, and confluent. It does not itch or hurt. Patient thinks that it self-resolves after about a day. She thinks that it may be sometimes triggered by drinking beer or wine.     Patient continues to have dry mouth that has led to poor dentition. She is going to get new caps/crowns soon. She cannot eat dry food without water. She carries water with her everywhere and uses Biotin to control her symptoms.     Interval history May 20, 2022  She notes aching joint/muscle pain, mostly in the legs. Pain is most noticeable when walking on uneven ground and going upstairs.  She has resumed gardening this season; she  "\"works up a sweat\" with outdoor activity for 20 minutes.  Joints are sore in the morning for ~ 30 minutes; moist heat helps.    No foamy or frothy urine.  No bowel or bladder incontinence.  No change in strength or sensation in the arms or legs. + raynauds, stable.  No skin thickening or tightening consistent with sclerodactyly.  No symptoms consistent with dactylitis.  No red hot or swollen joints.  some joint pain.  some joint stiffness.    Dry eye mouth symtpoms are stable.  She has cataracts and will have surgery in summer 2022.  She notes gradually worsening cough (X > 3 years).    Interval history 09-:  Patient was seen in ophthalmology on August 23, 2021.  Impression was of long-term use of Plaquenil; no evidence of retinal Plaquenil toxicity. She has been on plaquenil since 2010, 400 mg/day. Today she states she is having no lupus symptoms currently. She is still having osteoarthritis in her shoulders, hands and knees. She also states she is having generalized muscle and joint pain, however the muscle pain is worse in her legs and upper thighs, this pain started in the bottom of her left leg and has moved up over the past three months, it is sharp in character. She has no personal history of blood clots, but her son has had many blood clots. She says that she had her left shoulder replaced in June of 2021, however she says her right may need to be replaced soon. She has had both knees replaced and says they need to be replaced again. She says that it takes a couple of hours for her joints to loosen up. She is also having some neck pain that has gotten worse over the past year. Her sicca symptoms have been stable.    She says she has gained around 30 lbs back since COVID started due to her gym being shut down and not able to exercise as much.    No new or persistent headache.  Some new hair thinning.  No change in vision or hearing.  No inflammatory eye disease history.  No history of miscarriage.  No " history of blood clots.  Some dry eyes or dry mouth.  No facial rash.  No photosensitive rash.  No personal or first-degree relatives with psoriasis.  No nasal or oral ulcers.  No recurrent epistaxis or hemoptysis.  No recurrent sinusitis or recurrent pneumonia.  No chest pain, some shortness of breath (since 2010).  No abdominal pain, constipation, some diarrhea.  No blood in stool or black tarry stools.  No nausea or vomiting at baseline, sometimes nauseous with diarrhea.  No foamy or frothy urine.  No bowel or bladder incontinence.  No change in strength or sensation in the arms or legs. Some history of raynauds.  No skin thickening or tightening consistent with sclerodactyly.  No symptoms consistent with dactylitis.  No red hot or swollen joints.  some joint pain.  some joint stiffness.  Muscle pain, in both legs, problems with using her legs, some weakness, falling easily. No fevers, chills, weight loss or night sweats.      Review of Systems:   Pertinent items are noted in HPI, remainder of complete ROS is negative.        Active Medications:   Current Outpatient Medications   Medication Sig    Acetaminophen (TYLENOL PO) Take 1,000 mg by mouth every 6 hours as needed for mild pain or fever    Cholecalciferol (VITAMIN D3) 1000 units CAPS Take 1,000 Units by mouth daily    ClonAZEPAM (KLONOPIN) 0.5 MG tablet Take 1 tab during day and 2 at bedtime as needed (max of 3 tabs per 24 hours)    cyclobenzaprine (FLEXERIL) 10 MG tablet Take 1 tablet 3 times daily as needed    fluticasone (FLONASE) 50 MCG/ACT nasal spray SPRAY 1 SPRAY INTO EACH NOSTRIL EVERY DAY    hydrochlorothiazide (HYDRODIURIL) 50 MG tablet Take 25 mg by mouth daily.    hydroxychloroquine (PLAQUENIL) 200 MG tablet TAKE 1 TABLET BY MOUTH TWICE A DAY. Annual eye exam/plaquenil screening for refills, overdue. Please do now. Thank you.    levothyroxine (SYNTHROID/LEVOTHROID) 100 MCG tablet Take 100 mcg by mouth daily    metoprolol succinate (TOPROL-XL) 50  MG 24 hr tablet Take 50 mg by mouth daily    Multiple Vitamins-Minerals (CENTRUM SILVER PO) Take  by mouth.    pantoprazole (PROTONIX) 40 MG enteric coated tablet Take 1 tablet by mouth 2 times daily    potassium chloride ER (K-DUR/KLOR-CON M) 20 MEQ CR tablet TAKE 1 TABLET BY MOUTH EVERY DAY WITH FOOD    rOPINIRole (REQUIP) 1 MG tablet Take 1 mg in afternoon and 5 mg at bedtime    rOPINIRole (REQUIP) 5 MG tablet TAKE 1 TABLET BY MOUTH EVERY DAY IN THE EVENING    simvastatin (ZOCOR) 20 MG tablet Take one tab daily    traMADol (ULTRAM) 50 MG tablet Take 50 mg by mouth every 6 hours as needed for pain    venlafaxine (EFFEXOR) 75 MG tablet Take 75 mg by mouth 3 times daily    venlafaxine (EFFEXOR-XR) 150 MG 24 hr capsule Take 150 mg by mouth daily    amphetamine-dextroamphetamine (ADDERALL) 20 MG per tablet Take 30 mg by mouth 2 times daily  (Patient not taking: Reported on 9/22/2023)    atenolol (TENORMIN) 25 MG tablet 2 tablets daily. One in the morning, one at bedtime. (Patient not taking: Reported on 9/10/2021)    famotidine (PEPCID) 20 MG tablet Take 20 mg by mouth (Patient not taking: Reported on 9/22/2023)    mometasone (NASONEX) 50 MCG/ACT nasal spray 1 spray in each nostril once daily (Patient not taking: Reported on 9/10/2021)    ranitidine (ZANTAC) 150 MG tablet Take one at bedtime daily (Patient not taking: Reported on 9/10/2021)    sertraline (ZOLOFT) 100 MG tablet Take 150 mg by mouth every morning (Patient not taking: Reported on 9/10/2021)    triamcinolone (KENALOG) 0.1 % external cream APPLY A THIN LAYER TO THE AFFECTED AREA(S) BY TOPICAL ROUTE 3 TIMES PER DAY (Patient not taking: Reported on 9/22/2023)    VIIBRYD 20 MG TABS tablet 20 mg by Oral or Feeding Tube route daily (Patient not taking: Reported on 9/22/2023)     No current facility-administered medications for this visit.         Allergies:  Dust Mite Extract   Penicillins   Sulfa Drugs     Past Medical History:  Encounter for long-term current  use of medication  Lymphocytic colitis   Renal insufficiency   Hypokalemia   Diastolic dysfunction   Tobacco use disorder   Shortness of breath   Sjögren's syndrome   Vitamin D deficiency   Systemic lupus erythematosus   Migraines   Overweight  Primary osteoarthritis   Esophageal reflux   Anemia, unspecified   Attention deficit disorder without mention of hyperactivity   Generalized anxiety disorder   Episodic Mood Disorder   Hypothyroid   Hyperlipidemia    Allergic rhinitis seasonal    Hypertension     Past Surgical History:  Hysterectomy 1/1/1978 - 12/31/1978  ([place]) MD Bone Graft Tib Fib FX W BMP 1/1/1992 - 12/31/1992    Bunionectomy, right   Knee replacement, left 1/1/2006 - 12/31/2006      Thumb(s) surgery  Knee replacement, right 6/1/2008 - 6/30/2008  Upper arm/elbow surgery 12/1/2008 - 12/31/2008       Colonoscopy screening 1/27/11   Bladder suspension 10/1/2005 - 10/31/2005       Colon surgery 6/1/2018 - 6/30/2018      Family History:    The patient's family history includes Sjogren's in her daughter; lupus (with more skin involvement) in her sister.  Son has extensive history of arterial blood clots    Social History:  The patient reports that she has quit smoking. She smoked 0.50 packs per day. She has never used smokeless tobacco. She reports current alcohol use. She reports that she does not use drugs. She is a retired .  PCP: Johnson Arnold MD  Marital Status:      Physical Exam:   /86 (BP Location: Right arm, Patient Position: Sitting, Cuff Size: Adult Regular)   Pulse 74   Wt 88 kg (194 lb)   SpO2 98%   BMI 34.37 kg/m     Wt Readings from Last 4 Encounters:   09/22/23 88 kg (194 lb)   05/20/22 93.3 kg (205 lb 11.2 oz)   09/10/21 92.7 kg (204 lb 6.4 oz)   08/24/21 93.4 kg (205 lb 14.6 oz)     Constitutional: Well-developed, appearing stated age; cooperative.  Eyes: Normal EOM, PERRLA, conjunctiva, sclera.  ENT: Normal external ears, nose, hearing, lips, gums; dentition  poor; fissures over the tongue; moderate decrease in saliva pool  Neck: No visible mass or thyroid enlargement.  Resp: normal work of breathing  MS:  On the right hand, there is bony angulation in the PIP and DIP knuckles more so than left hand. Good range of motion of the wrist joint, no pain upon palpation of the joint space. Good fist formation. Elbows have normal extension and flexion, no effusion. Fatty changes on left knee secondary to knee replacement surgery, normal ROM, no effusion. Greater splay apparent between the phalanges and digits of the R foot than the L; R 5th digit MTP more prominent than L; fixed flexion of the 4th digit DIPs bilaterally.   Skin: No nail pitting, alopecia, rash, nodules or lesions.  Neuro: Grossly normal cranial nerves  Psych: Normal judgement, orientation, memory, affect.     Imaging:    Laboratory:       Latest Ref Rng & Units 6/29/2020    10:15 AM 6/9/2021     7:09 AM 6/9/2022     4:53 PM   RHEUM RESULTS   ALT 0 - 50 U/L 26      AST 0 - 45 U/L 28      Complement C3 83 - 177 mg/dL   125    Complement C4 19 - 59 mg/dL   13    Creatinine 0.52 - 1.04 mg/dL 1.04      DNA-ds <10.0 IU/mL   0.9    GFR Estimate If Black >60 mL/min/[1.73_m2] 61      GFR Estimate >60 mL/min/[1.73_m2] 53      Hematocrit 35.0 - 47.0 % 41.5      Hemoglobin 12.0 - 16.0 g/dL 13.6  10.3     WBC 4.0 - 11.0 10e9/L 4.5      RBC Count 3.8 - 5.2 10e12/L 4.31      RDW 10.0 - 15.0 % 13.2      MCHC 31.5 - 36.5 g/dL 32.8      MCV 78 - 100 fl 96      Platelet Count 150 - 450 10e9/L 209        Rheumatoid Factor   Date Value Ref Range Status   06/16/2010 10 0 - 14 IU/mL Final     Ribonucleic Protein IgG Antibody   Date Value Ref Range Status   06/16/2010 0  Final     Comment:     Reference range: 0 to 40  Unit: AU/mL  (Note)  REFERENCE INTERVAL: Ribonucleic Protein (SAMANTHA), IgG   29 AU/mL or Less ............. Negative   30 - 40 AU/mL ................ Equivocal   41 AU/mL or Greater .......... Positive    RNP antibody is  seen in % of mixed connective tissue  disease and is considered specific for this syndrome if  other antibodies are negative. RNP is also present in  20-30% of systemic lupus erythematosus (SLE) and 15-25%  of progressive systemic sclerosis (PSS).  Performed by [facility],  [address] [place] [address],[place] [address] [phone]  [email or web address], Virgen Casillas MD, Lab. Director     Cordero Antibody IgG   Date Value Ref Range Status   06/16/2010 0  Final     Comment:     Reference range: 0 to 40  Unit: AU/mL  (Note)  REFERENCE INTERVALS: SMITH (SAMANTHA) Ab, IgG   29 AU/mL or Less ............. Negative   30 - 40 AU/mL ................ Equivocal   41 AU/mL or Greater .......... Positive    Cordero antibody is very specific for systemic lupus  erythematosus (SLE) but only occurs in 30-35% of SLE  cases. The presence of antibodies to Cordero is often  associated with renal disease.  Performed by [facility],  [address] [facility] [place] [address] [phone]  [email or web address], Virgen Casillas MD, Lab. Director     SSA (RO) Antibody IgG   Date Value Ref Range Status   06/16/2010 4  Final     Comment:     Reference range: 0 to 40  Unit: AU/mL  (Note)  REFERENCE INTERVALS: SSA (Ro) (SAMANTHA) Ab, IgG   29 AU/mL or Less ............. Negative   30 - 40 AU/mL ................ Equivocal   41 AU/mL or Greater .......... Positive    SSA (Ro) antibody is seen in 70-75% of Sjogren syndrome  cases, 30-40% of systemic lupus erythematosus (SLE) and  5-10% of progressive systemic sclerosis (PSS).  Performed by [facility],  [address] [facility] [place] [address] [phone]  [email or web address], Virgen Casillas MD, Lab. Director     SSB (LA) Antibody IgG   Date Value Ref Range Status   06/16/2010 0  Final     Comment:     Reference range: 0 to 40  Unit: AU/mL  (Note)  REFERENCE INTERVALS: SSB (La) (SAMANTHA) Ab, IgG   29 AU/mL or Less ............. Negative   30 - 40 AU/mL ................ Equivocal   41 AU/mL or Greater .......... Positive    SSB  (La) antibody is seen in 50-60% of Sjogren syndrome  cases and is specific if it is the only SAMANTHA antibody  present. 15-25% of patients with systemic lupus  erythematosus (SLE) and 5-10% of patients with progressive  systemic sclerosis (PSS) also have this antibody.  Performed by [facility],  [address] [place] [address] [phone]  [email or web address], Virgen Casillas MD, Lab. Director     KATIE Screen by EIA   Date Value Ref Range Status   2010 1.2 (H) 0 - 1.0 Final     Comment:     Interpretation:  Weakly Positive     DNA-ds   Date Value Ref Range Status   10/04/2018 1 <10 IU/mL Final     Comment:     Negative     Hep B Surface Agn   Date Value Ref Range Status   10/12/2011 Negative NEG Final     Albumin Fraction   Date Value Ref Range Status   2016 4.1 3.7 - 5.1 g/dL Final     Alpha 2 Fraction   Date Value Ref Range Status   2016 0.9 0.5 - 0.9 g/dL Final     Beta Fraction   Date Value Ref Range Status   2016 0.8 0.6 - 1.0 g/dL Final     Gamma Fraction   Date Value Ref Range Status   2016 0.8 0.7 - 1.6 g/dL Final     Monoclonal Peak   Date Value Ref Range Status   2016 0.0 0.0 g/dL Final     ELP Interpretation:   Date Value Ref Range Status   2016   Final    Essentially normal electrophoretic pattern.  No monoclonal protein seen.   Pathologic significance requires clinical correlation.  ISIDRO Murguia M.D.,   Ph.D., Pathologist ().       [facility]  Rheumatology Clinic  Deng Pruett MD  2023     Name: Shu Duckworth  MRN: [id number]  Age: 78 year old  : 1945  Referring provider: Referred Self    Assessment and Plan:  # Systemic lupus erythematosus with secondary Sjögren's syndrome (malar rash, arthralgia, sicca symptoms, +KATIE, +dsDNA, hypocomplementemia, +Schirmer's test):  Mrs. Duckworth reports ongoing diffuse joint pain, low-grade in character, and improved with activity.  She reports maintaining of vigorous physical activity schedule  with daily gardening despite the pain.  Physical exam shows osteoarthritis changes in the right significantly more so than in the left hand fist formation and  strength are normal.    Laboratory evaluation in June 2021 revealed hemoglobin of 10.3; urinalysis was clear; creatinine was 1.11 with a GFR of 58.    Systemic lupus erythematosus is quiescent. I recommend continuing Plaquenil monotherapy 400 mg daily. While on the medication, patient should have annual ophthalmology evaluation for retinal toxicity. Most recent exam per her report was conducted in August 2021:    Plan:  1.  Continue Plaquenil 400 mg daily.  While on Plaquenil, undergo annual ophthalmology evaluation for rare retinal toxicity.  2.  Repeat urinalysis, complete blood count, and creatinine at your convenience.  3.  Continue acetaminophen 1000 mg to 2000 mg daily for residual joint pain.  4.  Continue aerobic exercise daily to maintain joint and muscle health.  5. Continue symptomatic treatment for mild sicca and dry eye symptoms.  6. Agree that weight loss, diet and exercise will all help with joint symptoms      # L leg swelling: No further swelling, however she is having migratory pain that is working its way up her leg; doppler ultrasounds of the left lower leg to rule out thrombosis in 12-19 were negative. Physical exam shows no joint space pathology. Trochanteric pain syndrome is most likely due to bursitis/tenosynovitis/muscle pain and will likely improve spontaneosly over time. She was advised to continue use of tylenol and tramadol for pain and exercise/weight loss.  I recommend heat 20 minutes twice daily, gentle stretching and exercises for the lateral thigh.  Corticosteroid injection could be supplied if improvement is not occurring steadily in the next 3 to 4 weeks. Use acetaminophen 500 to 1000 mg up to 3 times daily for joint pain.      # Osteoarthritis, thumbs:  No indication for immunomodulatory therapy.  # Ulnar neuropathy,  L;  Symptoms reflect post-operative change, following L elbow replacement in 2018. Stable.  #Advanced dental caries and periodontitis: I emphasized the importance of dental hygiene to control of systemic inflammatory symptoms.  I recommended evaluation in dentistry.    Return to clinic in 6 months.     Orders:  Orders Placed This Encounter   Procedures    X-ray bl Foot 3+ views    Complement C3    Complement C4    DNA double stranded antibodies    CBC with platelets    Routine UA with Micro Reflex to Culture    Rheumatoid factor    Cyclic Citrullinated Peptide Antibody IgG     Follow-up: Return in about 6 months (around 3/10/2022)    I was present with the medical student who participated in the service and in the documentation of the note. I have verified the history and personally performed the physical exam and medical decision making. I agree with the assessment and plan of care as documented in the note.    Deng Pruett M.D.  Staff Rheumatologist,  Health  Pager [phone]      HPI:   Shu Duckworth has a history including systemic lupus erythematosus with secondary Sjögren's syndrome (+sicca symptoms, +Schirmer test, -minor salivary gland biopsy) who presents for follow-up. The patient was last evaluated in 6-2020 in follow up of lupus. Symptoms were overall judged reasonably controlled on Plaquenil. Plaquenil was continued at 400 mg a day.    Interval history September 22, 2023      Interval history 09-:  Patient was seen in ophthalmology on August 23, 2021.  Impression was of long-term use of Plaquenil; no evidence of retinal Plaquenil toxicity. She has been on plaquenil since 2010, 400 mg/day. Today she states she is having no lupus symptoms currently. She is still having osteoarthritis in her shoulders, hands and knees. She also states she is having generalized muscle and joint pain, however the muscle pain is worse in her legs and upper thighs, this pain started in the bottom of her left leg  and has moved up over the past three months, it is sharp in character. She has no personal history of blood clots, but her son has had many blood clots. She says that she had her left shoulder replaced in June of 2021, however she says her right may need to be replaced soon. She has had both knees replaced and says they need to be replaced again. She says that it takes a couple of hours for her joints to loosen up. She is also having some neck pain that has gotten worse over the past year. Her sicca symptoms have been stable.    She says she has gained around 30 lbs back since COVID started due to her gym being shut down and not able to exercise as much.      No new or persistent headache.  Some new hair thinning.  No change in vision or hearing.  No inflammatory eye disease history.  No history of miscarriage.  No history of blood clots.  Some dry eyes or dry mouth.  No facial rash.  No photosensitive rash.  No personal or first-degree relatives with psoriasis.  No nasal or oral ulcers.  No recurrent epistaxis or hemoptysis.  No recurrent sinusitis or recurrent pneumonia.  No chest pain, some shortness of breath (since 2010).  No abdominal pain, constipation, some diarrhea.  No blood in stool or black tarry stools.  No nausea or vomiting at baseline, sometimes nauseous with diarrhea.  No foamy or frothy urine.  No bowel or bladder incontinence.  No change in strength or sensation in the arms or legs. Some history of raynauds.  No skin thickening or tightening consistent with sclerodactyly.  No symptoms consistent with dactylitis.  No red hot or swollen joints.  some joint pain.  some joint stiffness.  Muscle pain, in both legs, problems with using her legs, some weakness, falling easily. No fevers, chills, weight loss or night sweats.      Interval history 06-:    She continues with diffuse pain; the sides of her feet, muscles, and joints.   She notes swelling in her R hand, constant, not affected by  activity.     She has been gardening frequently; she is able to grasp and pull weeds. She opens jars fine.  She takes acetaminopehn 500 mg 2-4 tabs per day. +significant relief from joint pain, temporary.    She notes some balance/insteadiness with getting up from a chair, or with rapid turning. No vertigo; minimal dizziness normally.       Review of Systems:   Pertinent items are noted in HPI, remainder of complete ROS is negative.        Active Medications:   Current Outpatient Medications   Medication    Acetaminophen (TYLENOL PO)    Cholecalciferol (VITAMIN D3) 1000 units CAPS    ClonAZEPAM (KLONOPIN) 0.5 MG tablet    cyclobenzaprine (FLEXERIL) 10 MG tablet    fluticasone (FLONASE) 50 MCG/ACT nasal spray    hydrochlorothiazide (HYDRODIURIL) 50 MG tablet    hydroxychloroquine (PLAQUENIL) 200 MG tablet    levothyroxine (SYNTHROID/LEVOTHROID) 100 MCG tablet    metoprolol succinate (TOPROL-XL) 50 MG 24 hr tablet    Multiple Vitamins-Minerals (CENTRUM SILVER PO)    pantoprazole (PROTONIX) 40 MG enteric coated tablet    potassium chloride ER (K-DUR/KLOR-CON M) 20 MEQ CR tablet    rOPINIRole (REQUIP) 1 MG tablet    rOPINIRole (REQUIP) 5 MG tablet    simvastatin (ZOCOR) 20 MG tablet    traMADol (ULTRAM) 50 MG tablet    venlafaxine (EFFEXOR) 75 MG tablet    venlafaxine (EFFEXOR-XR) 150 MG 24 hr capsule    amphetamine-dextroamphetamine (ADDERALL) 20 MG per tablet    atenolol (TENORMIN) 25 MG tablet    famotidine (PEPCID) 20 MG tablet    mometasone (NASONEX) 50 MCG/ACT nasal spray    ranitidine (ZANTAC) 150 MG tablet    sertraline (ZOLOFT) 100 MG tablet    triamcinolone (KENALOG) 0.1 % external cream    VIIBRYD 20 MG TABS tablet     No current facility-administered medications for this visit.       Allergies:  Dust Mite Extract   Penicillins   Sulfa Drugs     Past Medical History:  Encounter for long-term current use of medication  Lymphocytic colitis   Renal insufficiency   Hypokalemia   Diastolic dysfunction   Tobacco  use disorder   Shortness of breath   Sjögren's syndrome   Vitamin D deficiency   Systemic lupus erythematosus   Migraines   Overweight  Primary osteoarthritis   Esophageal reflux   Anemia, unspecified   Attention deficit disorder without mention of hyperactivity   Generalized anxiety disorder   Episodic Mood Disorder   Hypothyroid   Hyperlipidemia    Allergic rhinitis seasonal    Hypertension     Past Surgical History:  Hysterectomy 1/1/1978 - 12/31/1978  (IA) LA Bone Graft Tib Fib FX W BMP 1/1/1992 - 12/31/1992    Bunionectomy, right   Knee replacement, left 1/1/2006 - 12/31/2006      Thumb(s) surgery  Knee replacement, right 6/1/2008 - 6/30/2008  Upper arm/elbow surgery 12/1/2008 - 12/31/2008       Colonoscopy screening 1/27/11   Bladder suspension 10/1/2005 - 10/31/2005       Colon surgery 6/1/2018 - 6/30/2018      Family History:    The patient's family history includes Sjogren's in her daughter; lupus (with more skin involvement) in her sister.  Son has extensive history of arterial blood clots    Social History:  The patient reports that she has quit smoking. She smoked 0.50 packs per day. She has never used smokeless tobacco. She reports current alcohol use. She reports that she does not use drugs. She is a retired .  PCP: Johnson Arnold MD  Marital Status:      Physical Exam:   /86 (BP Location: Right arm, Patient Position: Sitting, Cuff Size: Adult Regular)   Pulse 74   Wt 88 kg (194 lb)   SpO2 98%   BMI 34.37 kg/m     Wt Readings from Last 4 Encounters:   09/22/23 88 kg (194 lb)   05/20/22 93.3 kg (205 lb 11.2 oz)   09/10/21 92.7 kg (204 lb 6.4 oz)   08/24/21 93.4 kg (205 lb 14.6 oz)     Constitutional: Well-developed, appearing stated age; cooperative.  Eyes: Normal EOM, PERRLA, vision, conjunctiva, sclera.  ENT: Normal external ears, nose, hearing, lips, teeth, gums, normal saliva pool  Neck: No mass or thyroid enlargement.  Cardio: RRR, no murmurs rubs or  gallops  Resp: lungs CTA bilaterally at bases of lungs, normal work of breathing  MS:  On the right hand, there is bony angulation in the knuckles more so than left hand, some swelling of the left wrist, Good range of motion of the wrist joint, no pain upon palpation of the joint space. Good fist formation. Elbows have normal extension and flexion, no effusion. Fatty changes on left knee secondary to knee replacement surgery, normal ROM, no effusion. Hips have normal ROM bilaterally (interal, external rotation and flexion/extension), pain upon palpation of left leg around greater head of the trochanter.  Skin: No nail pitting, alopecia, rash, nodules or lesions.  Neuro: Normal cranial nerves  Psych: Normal judgement, orientation, memory, affect.     Imaging:    Laboratory:       Latest Ref Rng & Units 6/29/2020    10:15 AM 6/9/2021     7:09 AM 6/9/2022     4:53 PM   RHEUM RESULTS   ALT 0 - 50 U/L 26      AST 0 - 45 U/L 28      Complement C3 83 - 177 mg/dL   125    Complement C4 19 - 59 mg/dL   13    Creatinine 0.52 - 1.04 mg/dL 1.04      DNA-ds <10.0 IU/mL   0.9    GFR Estimate If Black >60 mL/min/[1.73_m2] 61      GFR Estimate >60 mL/min/[1.73_m2] 53      Hematocrit 35.0 - 47.0 % 41.5      Hemoglobin 12.0 - 16.0 g/dL 13.6  10.3     WBC 4.0 - 11.0 10e9/L 4.5      RBC Count 3.8 - 5.2 10e12/L 4.31      RDW 10.0 - 15.0 % 13.2      MCHC 31.5 - 36.5 g/dL 32.8      MCV 78 - 100 fl 96      Platelet Count 150 - 450 10e9/L 209        Rheumatoid Factor   Date Value Ref Range Status   06/16/2010 10 0 - 14 IU/mL Final     Ribonucleic Protein IgG Antibody   Date Value Ref Range Status   06/16/2010 0  Final     Comment:     Reference range: 0 to 40  Unit: AU/mL  (Note)  REFERENCE INTERVAL: Ribonucleic Protein (SAMANTHA), IgG   29 AU/mL or Less ............. Negative   30 - 40 AU/mL ................ Equivocal   41 AU/mL or Greater .......... Positive    RNP antibody is seen in % of mixed connective tissue  disease and is  considered specific for this syndrome if  other antibodies are negative. RNP is also present in  20-30% of systemic lupus erythematosus (SLE) and 15-25%  of progressive systemic sclerosis (PSS).  Performed by [facility],  [address] [place] [address] [id number] [phone]  [email or web address], Virgen Casillas MD, Lab. Director     Cordero Antibody IgG   Date Value Ref Range Status   06/16/2010 0  Final     Comment:     Reference range: 0 to 40  Unit: AU/mL  (Note)  REFERENCE INTERVALS: SMITH (SAMANTHA) Ab, IgG   29 AU/mL or Less ............. Negative   30 - 40 AU/mL ................ Equivocal   41 AU/mL or Greater .......... Positive    Cordero antibody is very specific for systemic lupus  erythematosus (SLE) but only occurs in 30-35% of SLE  cases. The presence of antibodies to Cordero is often  associated with renal disease.  Performed by [facility],  [address] [place] [address] [id number] [phone]  [email or web address], Virgen Casillas MD, Lab. Director     SSA (RO) Antibody IgG   Date Value Ref Range Status   06/16/2010 4  Final     Comment:     Reference range: 0 to 40  Unit: AU/mL  (Note)  REFERENCE INTERVALS: SSA (Ro) (SAMANTHA) Ab, IgG   29 AU/mL or Less ............. Negative   30 - 40 AU/mL ................ Equivocal   41 AU/mL or Greater .......... Positive    SSA (Ro) antibody is seen in 70-75% of Sjogren syndrome  cases, 30-40% of systemic lupus erythematosus (SLE) and  5-10% of progressive systemic sclerosis (PSS).  Performed by [facility],  [address] [place] [address],[place] [id number] [phone]  [email or web address], Virgen Casillas MD, Lab. Director     SSB (LA) Antibody IgG   Date Value Ref Range Status   06/16/2010 0  Final     Comment:     Reference range: 0 to 40  Unit: AU/mL  (Note)  REFERENCE INTERVALS: SSB (La) (SAMANTHA) Ab, IgG   29 AU/mL or Less ............. Negative   30 - 40 AU/mL ................ Equivocal   41 AU/mL or Greater .......... Positive    SSB (La) antibody is seen in 50-60% of Sjogren syndrome  cases  and is specific if it is the only SAMANTHA antibody  present. 15-25% of patients with systemic lupus  erythematosus (SLE) and 5-10% of patients with progressive  systemic sclerosis (PSS) also have this antibody.  Performed by [facility],  [address] [place] [id number] [phone]  [email or web address] [email or web address]), Virgen Casillas MD, Lab. Director     KATIE Screen by EIA   Date Value Ref Range Status   06/16/2010 1.2 (H) 0 - 1.0 Final     Comment:     Interpretation:  Weakly Positive     DNA-ds   Date Value Ref Range Status   10/04/2018 1 <10 IU/mL Final     Comment:     Negative     Hep B Surface Agn   Date Value Ref Range Status   10/12/2011 Negative NEG Final     Albumin Fraction   Date Value Ref Range Status   12/22/2016 4.1 3.7 - 5.1 g/dL Final     Alpha 2 Fraction   Date Value Ref Range Status   12/22/2016 0.9 0.5 - 0.9 g/dL Final     Beta Fraction   Date Value Ref Range Status   12/22/2016 0.8 0.6 - 1.0 g/dL Final     Gamma Fraction   Date Value Ref Range Status   12/22/2016 0.8 0.7 - 1.6 g/dL Final     Monoclonal Peak   Date Value Ref Range Status   12/22/2016 0.0 0.0 g/dL Final     ELP Interpretation:   Date Value Ref Range Status   12/22/2016   Final    Essentially normal electrophoretic pattern.  No monoclonal protein seen.   Pathologic significance requires clinical correlation.  ISIDRO Murguia M.D.,   Ph.D., Pathologist ().           Again, thank you for allowing me to participate in the care of your patient.      Sincerely,    Deng Pruett MD

## 2023-09-22 NOTE — NURSING NOTE
Chief Complaint   Patient presents with    RECHECK     /86 (BP Location: Right arm, Patient Position: Sitting, Cuff Size: Adult Regular)   Pulse 74   Wt 88 kg (194 lb)   SpO2 98%   BMI 34.37 kg/m

## 2023-09-22 NOTE — PROGRESS NOTES
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 Duckworth reports ongoing multifocal joint pain that is most noticeable in her hands, wrists, and feet (cathy L), but does not prevent her from gardening, painting, etc. She has ~1-2 hours of morning stiffness. Recently had increased symptoms after going a few days without her Plaquenil. Physical exam shows osteoarthritis changes in the right significantly more so than in the left hand; fist formation and  strength are normal. Increased splay of the phalanges of the R foot and prominence of the 5th MTP on that foot.    Data: In 2022, complement C3 and C4 were normal except for slightly decreased C4 13; double-stranded DNA was negative.  Xray L foot 23: Comminuted mildly displaced fracture involving the distal shaft of the fifth metatarsal. Advanced arthritic change at the first metatarsophalangeal joint. Displaced fracture through the base of the proximal phalanx left third toe, age indeterminate.     Discussion: Systemic lupus erythematosus with secondary Sjogren's are generally well controlled. However, small joint symptoms and morning stiffness are potentially concerning for concurrent RA. Anatomic changes of the R foot may indicate previous inflammatory arthritis. I recommend continuing Plaquenil monotherapy 400 mg daily, as this does appear to be having a positive impact on patient's symptoms. While on the medication, patient should have annual ophthalmology evaluation for retinal toxicity. Will also get xrays of the feet to better characterize anatomic changes and repeat rheum screening flabs.     # Osteoarthritis, hands (R>L), wrists,  shoulders, and knees:  No indication for immunomodulatory therapy. Continue regular physical exercise.    # Advanced dental caries and periodontitis: Currently following with dentistry. Drinks a lot of water and using Biotin to control symptoms.     Plan  1.  Continue hydroxychloroquine 400 mg daily with annual eye exams to monitor for toxicity and bloodwork as soon as possible.  2.  Osteoarthritis, hands (R more than L), wrists, shoulders, knees. Continue regular physical exercise.  3.  Dental caries: Lower tooth capping/crowns are planned. Continue symptomatic treatment for Sjogren's syndrome with frequent drinking, artificial tears, etc.  4.  Continue aerobic exercise daily to maintain joint and muscle health.  5. Agree that weight loss, diet and exercise will all help with joint pain/stiffness symptoms    # Leg swelling: Being followed by her PCP. Currently trialing Lasix.     # Ulnar neuropathy, L;  Did not discuss today      Return to clinic in 6 months.     Orders:  Orders Placed This Encounter   Procedures    X-ray bl Foot 3+ views    Complement C3    Complement C4    DNA double stranded antibodies    CBC with platelets    Routine UA with Micro Reflex to Culture    Rheumatoid factor    Cyclic Citrullinated Peptide Antibody IgG     Follow-up: Return in about 6 months (around 3/10/2022)    I was present with the medical student who took the history and acted as a scribe. I have verified the history, reviewed imaging and laboratory data, and personally performed the physical exam. I formulated the assessment and plan.    Deng Pruett M.D.  Staff Rheumatologist, [facility]  Pager [phone]        HPI:   Shu Duckworth has a history including systemic lupus erythematosus with secondary Sjögren's syndrome (+sicca symptoms, +Schirmer test, -minor salivary gland biopsy) who presents for follow-up. The patient was last evaluated in 5-2022 in follow up of lupus. Symptoms were overall judged reasonably controlled.  "Plaquenil was continued at 400 mg a day.    Interval history September 22, 2023  Patient has been doing OK recently. She continues to have aching pain in her hands and feet. Since summer 2022, she has noticed that the tips of the toes of her L foot are very sensitive---sometimes she cannot even wear a sock on that food. She also has some plantar fascia pain in that foot. She has a couple hours of morning stiffness every day, but says that the worst of her pain is after she has been active. Yesterday, woke up with a trigger finger in the 3rd digit of her L hand which resolved on its own after an hour or so. She also recently had an increase in symptoms for a few days while she was off of her Plaquenil after forgetting to refill her prescription. She takes 1-2 Tramadol and usually 1-2 Tylenol per day to manage her pain. Using her electric blanket is also helpful when she is cold.     Patient reports occasional rash over the bridge of her nose and cheeks. This rash is light red, flat, and confluent. It does not itch or hurt. Patient thinks that it self-resolves after about a day. She thinks that it may be sometimes triggered by drinking beer or wine.     Patient continues to have dry mouth that has led to poor dentition. She is going to get new caps/crowns soon. She cannot eat dry food without water. She carries water with her everywhere and uses Biotin to control her symptoms.     Interval history May 20, 2022  She notes aching joint/muscle pain, mostly in the legs. Pain is most noticeable when walking on uneven ground and going upstairs.  She has resumed gardening this season; she \"works up a sweat\" with outdoor activity for 20 minutes.  Joints are sore in the morning for ~ 30 minutes; moist heat helps.    No foamy or frothy urine.  No bowel or bladder incontinence.  No change in strength or sensation in the arms or legs. + raynauds, stable.  No skin thickening or tightening consistent with sclerodactyly.  No " symptoms consistent with dactylitis.  No red hot or swollen joints.  some joint pain.  some joint stiffness.    Dry eye mouth symtpoms are stable.  She has cataracts and will have surgery in summer 2022.  She notes gradually worsening cough (X > 3 years).    Interval history 09-:  Patient was seen in ophthalmology on August 23, 2021.  Impression was of long-term use of Plaquenil; no evidence of retinal Plaquenil toxicity. She has been on plaquenil since 2010, 400 mg/day. Today she states she is having no lupus symptoms currently. She is still having osteoarthritis in her shoulders, hands and knees. She also states she is having generalized muscle and joint pain, however the muscle pain is worse in her legs and upper thighs, this pain started in the bottom of her left leg and has moved up over the past three months, it is sharp in character. She has no personal history of blood clots, but her son has had many blood clots. She says that she had her left shoulder replaced in June of 2021, however she says her right may need to be replaced soon. She has had both knees replaced and says they need to be replaced again. She says that it takes a couple of hours for her joints to loosen up. She is also having some neck pain that has gotten worse over the past year. Her sicca symptoms have been stable.    She says she has gained around 30 lbs back since COVID started due to her gym being shut down and not able to exercise as much.    No new or persistent headache.  Some new hair thinning.  No change in vision or hearing.  No inflammatory eye disease history.  No history of miscarriage.  No history of blood clots.  Some dry eyes or dry mouth.  No facial rash.  No photosensitive rash.  No personal or first-degree relatives with psoriasis.  No nasal or oral ulcers.  No recurrent epistaxis or hemoptysis.  No recurrent sinusitis or recurrent pneumonia.  No chest pain, some shortness of breath (since 2010).  No abdominal  pain, constipation, some diarrhea.  No blood in stool or black tarry stools.  No nausea or vomiting at baseline, sometimes nauseous with diarrhea.  No foamy or frothy urine.  No bowel or bladder incontinence.  No change in strength or sensation in the arms or legs. Some history of raynauds.  No skin thickening or tightening consistent with sclerodactyly.  No symptoms consistent with dactylitis.  No red hot or swollen joints.  some joint pain.  some joint stiffness.  Muscle pain, in both legs, problems with using her legs, some weakness, falling easily. No fevers, chills, weight loss or night sweats.      Review of Systems:   Pertinent items are noted in HPI, remainder of complete ROS is negative.        Active Medications:   Current Outpatient Medications   Medication Sig    Acetaminophen (TYLENOL PO) Take 1,000 mg by mouth every 6 hours as needed for mild pain or fever    Cholecalciferol (VITAMIN D3) 1000 units CAPS Take 1,000 Units by mouth daily    ClonAZEPAM (KLONOPIN) 0.5 MG tablet Take 1 tab during day and 2 at bedtime as needed (max of 3 tabs per 24 hours)    cyclobenzaprine (FLEXERIL) 10 MG tablet Take 1 tablet 3 times daily as needed    fluticasone (FLONASE) 50 MCG/ACT nasal spray SPRAY 1 SPRAY INTO EACH NOSTRIL EVERY DAY    hydrochlorothiazide (HYDRODIURIL) 50 MG tablet Take 25 mg by mouth daily.    hydroxychloroquine (PLAQUENIL) 200 MG tablet TAKE 1 TABLET BY MOUTH TWICE A DAY. Annual eye exam/plaquenil screening for refills, overdue. Please do now. Thank you.    levothyroxine (SYNTHROID/LEVOTHROID) 100 MCG tablet Take 100 mcg by mouth daily    metoprolol succinate (TOPROL-XL) 50 MG 24 hr tablet Take 50 mg by mouth daily    Multiple Vitamins-Minerals (CENTRUM SILVER PO) Take  by mouth.    pantoprazole (PROTONIX) 40 MG enteric coated tablet Take 1 tablet by mouth 2 times daily    potassium chloride ER (K-DUR/KLOR-CON M) 20 MEQ CR tablet TAKE 1 TABLET BY MOUTH EVERY DAY WITH FOOD    rOPINIRole (REQUIP) 1 MG  tablet Take 1 mg in afternoon and 5 mg at bedtime    rOPINIRole (REQUIP) 5 MG tablet TAKE 1 TABLET BY MOUTH EVERY DAY IN THE EVENING    simvastatin (ZOCOR) 20 MG tablet Take one tab daily    traMADol (ULTRAM) 50 MG tablet Take 50 mg by mouth every 6 hours as needed for pain    venlafaxine (EFFEXOR) 75 MG tablet Take 75 mg by mouth 3 times daily    venlafaxine (EFFEXOR-XR) 150 MG 24 hr capsule Take 150 mg by mouth daily    amphetamine-dextroamphetamine (ADDERALL) 20 MG per tablet Take 30 mg by mouth 2 times daily  (Patient not taking: Reported on 9/22/2023)    atenolol (TENORMIN) 25 MG tablet 2 tablets daily. One in the morning, one at bedtime. (Patient not taking: Reported on 9/10/2021)    famotidine (PEPCID) 20 MG tablet Take 20 mg by mouth (Patient not taking: Reported on 9/22/2023)    mometasone (NASONEX) 50 MCG/ACT nasal spray 1 spray in each nostril once daily (Patient not taking: Reported on 9/10/2021)    ranitidine (ZANTAC) 150 MG tablet Take one at bedtime daily (Patient not taking: Reported on 9/10/2021)    sertraline (ZOLOFT) 100 MG tablet Take 150 mg by mouth every morning (Patient not taking: Reported on 9/10/2021)    triamcinolone (KENALOG) 0.1 % external cream APPLY A THIN LAYER TO THE AFFECTED AREA(S) BY TOPICAL ROUTE 3 TIMES PER DAY (Patient not taking: Reported on 9/22/2023)    VIIBRYD 20 MG TABS tablet 20 mg by Oral or Feeding Tube route daily (Patient not taking: Reported on 9/22/2023)     No current facility-administered medications for this visit.         Allergies:  Dust Mite Extract   Penicillins   Sulfa Drugs     Past Medical History:  Encounter for long-term current use of medication  Lymphocytic colitis   Renal insufficiency   Hypokalemia   Diastolic dysfunction   Tobacco use disorder   Shortness of breath   Sjögren's syndrome   Vitamin D deficiency   Systemic lupus erythematosus   Migraines   Overweight  Primary osteoarthritis   Esophageal reflux   Anemia, unspecified   Attention deficit  disorder without mention of hyperactivity   Generalized anxiety disorder   Episodic Mood Disorder   Hypothyroid   Hyperlipidemia    Allergic rhinitis seasonal    Hypertension     Past Surgical History:  Hysterectomy 1/1/1978 - 12/31/1978  ([place]) AL Bone Graft Tib Fib FX W BMP 1/1/1992 - 12/31/1992    Bunionectomy, right   Knee replacement, left 1/1/2006 - 12/31/2006      Thumb(s) surgery  Knee replacement, right 6/1/2008 - 6/30/2008  Upper arm/elbow surgery 12/1/2008 - 12/31/2008       Colonoscopy screening 1/27/11   Bladder suspension 10/1/2005 - 10/31/2005       Colon surgery 6/1/2018 - 6/30/2018      Family History:    The patient's family history includes Sjogren's in her daughter; lupus (with more skin involvement) in her sister.  Son has extensive history of arterial blood clots    Social History:  The patient reports that she has quit smoking. She smoked 0.50 packs per day. She has never used smokeless tobacco. She reports current alcohol use. She reports that she does not use drugs. She is a retired .  PCP: Johnson Arnold MD  Marital Status:      Physical Exam:   /86 (BP Location: Right arm, Patient Position: Sitting, Cuff Size: Adult Regular)   Pulse 74   Wt 88 kg (194 lb)   SpO2 98%   BMI 34.37 kg/m     Wt Readings from Last 4 Encounters:   09/22/23 88 kg (194 lb)   05/20/22 93.3 kg (205 lb 11.2 oz)   09/10/21 92.7 kg (204 lb 6.4 oz)   08/24/21 93.4 kg (205 lb 14.6 oz)     Constitutional: Well-developed, appearing stated age; cooperative.  Eyes: Normal EOM, PERRLA, conjunctiva, sclera.  ENT: Normal external ears, nose, hearing, lips, gums; dentition poor; fissures over the tongue; moderate decrease in saliva pool  Neck: No visible mass or thyroid enlargement.  Resp: normal work of breathing  MS:  On the right hand, there is bony angulation in the PIP and DIP knuckles more so than left hand. Good range of motion of the wrist joint, no pain upon palpation of the joint space.  Good fist formation. Elbows have normal extension and flexion, no effusion. Fatty changes on left knee secondary to knee replacement surgery, normal ROM, no effusion. Greater splay apparent between the phalanges and digits of the R foot than the L; R 5th digit MTP more prominent than L; fixed flexion of the 4th digit DIPs bilaterally.   Skin: No nail pitting, alopecia, rash, nodules or lesions.  Neuro: Grossly normal cranial nerves  Psych: Normal judgement, orientation, memory, affect.     Imaging:    Laboratory:       Latest Ref Rng & Units 6/29/2020    10:15 AM 6/9/2021     7:09 AM 6/9/2022     4:53 PM   RHEUM RESULTS   ALT 0 - 50 U/L 26      AST 0 - 45 U/L 28      Complement C3 83 - 177 mg/dL   125    Complement C4 19 - 59 mg/dL   13    Creatinine 0.52 - 1.04 mg/dL 1.04      DNA-ds <10.0 IU/mL   0.9    GFR Estimate If Black >60 mL/min/[1.73_m2] 61      GFR Estimate >60 mL/min/[1.73_m2] 53      Hematocrit 35.0 - 47.0 % 41.5      Hemoglobin 12.0 - 16.0 g/dL 13.6  10.3     WBC 4.0 - 11.0 10e9/L 4.5      RBC Count 3.8 - 5.2 10e12/L 4.31      RDW 10.0 - 15.0 % 13.2      MCHC 31.5 - 36.5 g/dL 32.8      MCV 78 - 100 fl 96      Platelet Count 150 - 450 10e9/L 209        Rheumatoid Factor   Date Value Ref Range Status   06/16/2010 10 0 - 14 IU/mL Final     Ribonucleic Protein IgG Antibody   Date Value Ref Range Status   06/16/2010 0  Final     Comment:     Reference range: 0 to 40  Unit: AU/mL  (Note)  REFERENCE INTERVAL: Ribonucleic Protein (SAMANTHA), IgG   29 AU/mL or Less ............. Negative   30 - 40 AU/mL ................ Equivocal   41 AU/mL or Greater .......... Positive    RNP antibody is seen in % of mixed connective tissue  disease and is considered specific for this syndrome if  other antibodies are negative. RNP is also present in  20-30% of systemic lupus erythematosus (SLE) and 15-25%  of progressive systemic sclerosis (PSS).  Performed by [facility],  [address] [place] [address], [facility] [place] [address]  [phone]  [email or web address], Virgen Casillas MD, Lab. Director     Cordero Antibody IgG   Date Value Ref Range Status   06/16/2010 0  Final     Comment:     Reference range: 0 to 40  Unit: AU/mL  (Note)  REFERENCE INTERVALS: SMITH (SAMANTHA) Ab, IgG   29 AU/mL or Less ............. Negative   30 - 40 AU/mL ................ Equivocal   41 AU/mL or Greater .......... Positive    Cordero antibody is very specific for systemic lupus  erythematosus (SLE) but only occurs in 30-35% of SLE  cases. The presence of antibodies to Cordero is often  associated with renal disease.  Performed by [facility],  [address] [place] [address],[place] [id number] [phone]  [email or web address], Virgen Casillas MD, Lab. Director     SSA (RO) Antibody IgG   Date Value Ref Range Status   06/16/2010 4  Final     Comment:     Reference range: 0 to 40  Unit: AU/mL  (Note)  REFERENCE INTERVALS: SSA (Ro) (SAMANTHA) Ab, IgG   29 AU/mL or Less ............. Negative   30 - 40 AU/mL ................ Equivocal   41 AU/mL or Greater .......... Positive    SSA (Ro) antibody is seen in 70-75% of Sjogren syndrome  cases, 30-40% of systemic lupus erythematosus (SLE) and  5-10% of progressive systemic sclerosis (PSS).  Performed by [facility],  [address] [facility], [place] [address] [phone]  [email or web address], Virgen Casillas MD, Lab. Director     SSB (LA) Antibody IgG   Date Value Ref Range Status   06/16/2010 0  Final     Comment:     Reference range: 0 to 40  Unit: AU/mL  (Note)  REFERENCE INTERVALS: SSB (La) (SAMANTHA) Ab, IgG   29 AU/mL or Less ............. Negative   30 - 40 AU/mL ................ Equivocal   41 AU/mL or Greater .......... Positive    SSB (La) antibody is seen in 50-60% of Sjogren syndrome  cases and is specific if it is the only SAMANTHA antibody  present. 15-25% of patients with systemic lupus  erythematosus (SLE) and 5-10% of patients with progressive  systemic sclerosis (PSS) also have this antibody.  Performed by [facility],  [address] [place] [address],  [place] [address] [phone]  [email or web address], Virgen Casillas MD, Lab. Director     KATIE Screen by EIA   Date Value Ref Range Status   2010 1.2 (H) 0 - 1.0 Final     Comment:     Interpretation:  Weakly Positive     DNA-ds   Date Value Ref Range Status   10/04/2018 1 <10 IU/mL Final     Comment:     Negative     Hep B Surface Agn   Date Value Ref Range Status   10/12/2011 Negative NEG Final     Albumin Fraction   Date Value Ref Range Status   2016 4.1 3.7 - 5.1 g/dL Final     Alpha 2 Fraction   Date Value Ref Range Status   2016 0.9 0.5 - 0.9 g/dL Final     Beta Fraction   Date Value Ref Range Status   2016 0.8 0.6 - 1.0 g/dL Final     Gamma Fraction   Date Value Ref Range Status   2016 0.8 0.7 - 1.6 g/dL Final     Monoclonal Peak   Date Value Ref Range Status   2016 0.0 0.0 g/dL Final     ELP Interpretation:   Date Value Ref Range Status   2016   Final    Essentially normal electrophoretic pattern.  No monoclonal protein seen.   Pathologic significance requires clinical correlation.  ISIDRO Murguia M.D.,   Ph.D., Pathologist ().       [facility]  Rheumatology Clinic  Deng Pruett MD  2023     Name: Shu Duckworth  MRN: [id number]  Age: 78 year old  : 1945  Referring provider: Referred Self    Assessment and Plan:  # Systemic lupus erythematosus with secondary Sjögren's syndrome (malar rash, arthralgia, sicca symptoms, +KATIE, +dsDNA, hypocomplementemia, +Schirmer's test):  Mrs. Duckworth reports ongoing diffuse joint pain, low-grade in character, and improved with activity.  She reports maintaining of vigorous physical activity schedule with daily gardening despite the pain.  Physical exam shows osteoarthritis changes in the right significantly more so than in the left hand fist formation and  strength are normal.    Laboratory evaluation in 2021 revealed hemoglobin of 10.3; urinalysis was clear; creatinine was 1.11 with a GFR of  58.    Systemic lupus erythematosus is quiescent. I recommend continuing Plaquenil monotherapy 400 mg daily. While on the medication, patient should have annual ophthalmology evaluation for retinal toxicity. Most recent exam per her report was conducted in August 2021:    Plan:  1.  Continue Plaquenil 400 mg daily.  While on Plaquenil, undergo annual ophthalmology evaluation for rare retinal toxicity.  2.  Repeat urinalysis, complete blood count, and creatinine at your convenience.  3.  Continue acetaminophen 1000 mg to 2000 mg daily for residual joint pain.  4.  Continue aerobic exercise daily to maintain joint and muscle health.  5. Continue symptomatic treatment for mild sicca and dry eye symptoms.  6. Agree that weight loss, diet and exercise will all help with joint symptoms      # L leg swelling: No further swelling, however she is having migratory pain that is working its way up her leg; doppler ultrasounds of the left lower leg to rule out thrombosis in 12-19 were negative. Physical exam shows no joint space pathology. Trochanteric pain syndrome is most likely due to bursitis/tenosynovitis/muscle pain and will likely improve spontaneosly over time. She was advised to continue use of tylenol and tramadol for pain and exercise/weight loss.  I recommend heat 20 minutes twice daily, gentle stretching and exercises for the lateral thigh.  Corticosteroid injection could be supplied if improvement is not occurring steadily in the next 3 to 4 weeks. Use acetaminophen 500 to 1000 mg up to 3 times daily for joint pain.      # Osteoarthritis, thumbs:  No indication for immunomodulatory therapy.  # Ulnar neuropathy, L;  Symptoms reflect post-operative change, following L elbow replacement in 2018. Stable.  #Advanced dental caries and periodontitis: I emphasized the importance of dental hygiene to control of systemic inflammatory symptoms.  I recommended evaluation in dentistry.    Return to clinic in 6 months.      Orders:  Orders Placed This Encounter   Procedures    X-ray bl Foot 3+ views    Complement C3    Complement C4    DNA double stranded antibodies    CBC with platelets    Routine UA with Micro Reflex to Culture    Rheumatoid factor    Cyclic Citrullinated Peptide Antibody IgG     Follow-up: Return in about 6 months (around 3/10/2022)    I was present with the medical student who participated in the service and in the documentation of the note. I have verified the history and personally performed the physical exam and medical decision making. I agree with the assessment and plan of care as documented in the note.    Deng Pruett M.D.  Staff Rheumatologist, [facility]  Pager [phone]      HPI:   Shu Duckworth has a history including systemic lupus erythematosus with secondary Sjögren's syndrome (+sicca symptoms, +Schirmer test, -minor salivary gland biopsy) who presents for follow-up. The patient was last evaluated in 6-2020 in follow up of lupus. Symptoms were overall judged reasonably controlled on Plaquenil. Plaquenil was continued at 400 mg a day.    Interval history September 22, 2023      Interval history 09-:  Patient was seen in ophthalmology on August 23, 2021.  Impression was of long-term use of Plaquenil; no evidence of retinal Plaquenil toxicity. She has been on plaquenil since 2010, 400 mg/day. Today she states she is having no lupus symptoms currently. She is still having osteoarthritis in her shoulders, hands and knees. She also states she is having generalized muscle and joint pain, however the muscle pain is worse in her legs and upper thighs, this pain started in the bottom of her left leg and has moved up over the past three months, it is sharp in character. She has no personal history of blood clots, but her son has had many blood clots. She says that she had her left shoulder replaced in June of 2021, however she says her right may need to be replaced soon. She has had both knees  replaced and says they need to be replaced again. She says that it takes a couple of hours for her joints to loosen up. She is also having some neck pain that has gotten worse over the past year. Her sicca symptoms have been stable.    She says she has gained around 30 lbs back since COVID started due to her gym being shut down and not able to exercise as much.      No new or persistent headache.  Some new hair thinning.  No change in vision or hearing.  No inflammatory eye disease history.  No history of miscarriage.  No history of blood clots.  Some dry eyes or dry mouth.  No facial rash.  No photosensitive rash.  No personal or first-degree relatives with psoriasis.  No nasal or oral ulcers.  No recurrent epistaxis or hemoptysis.  No recurrent sinusitis or recurrent pneumonia.  No chest pain, some shortness of breath (since 2010).  No abdominal pain, constipation, some diarrhea.  No blood in stool or black tarry stools.  No nausea or vomiting at baseline, sometimes nauseous with diarrhea.  No foamy or frothy urine.  No bowel or bladder incontinence.  No change in strength or sensation in the arms or legs. Some history of raynauds.  No skin thickening or tightening consistent with sclerodactyly.  No symptoms consistent with dactylitis.  No red hot or swollen joints.  some joint pain.  some joint stiffness.  Muscle pain, in both legs, problems with using her legs, some weakness, falling easily. No fevers, chills, weight loss or night sweats.      Interval history 06-:    She continues with diffuse pain; the sides of her feet, muscles, and joints.   She notes swelling in her R hand, constant, not affected by activity.     She has been gardening frequently; she is able to grasp and pull weeds. She opens jars fine.  She takes acetaminopehn 500 mg 2-4 tabs per day. +significant relief from joint pain, temporary.    She notes some balance/insteadiness with getting up from a chair, or with rapid turning. No  vertigo; minimal dizziness normally.       Review of Systems:   Pertinent items are noted in HPI, remainder of complete ROS is negative.        Active Medications:   Current Outpatient Medications   Medication    Acetaminophen (TYLENOL PO)    Cholecalciferol (VITAMIN D3) 1000 units CAPS    ClonAZEPAM (KLONOPIN) 0.5 MG tablet    cyclobenzaprine (FLEXERIL) 10 MG tablet    fluticasone (FLONASE) 50 MCG/ACT nasal spray    hydrochlorothiazide (HYDRODIURIL) 50 MG tablet    hydroxychloroquine (PLAQUENIL) 200 MG tablet    levothyroxine (SYNTHROID/LEVOTHROID) 100 MCG tablet    metoprolol succinate (TOPROL-XL) 50 MG 24 hr tablet    Multiple Vitamins-Minerals (CENTRUM SILVER PO)    pantoprazole (PROTONIX) 40 MG enteric coated tablet    potassium chloride ER (K-DUR/KLOR-CON M) 20 MEQ CR tablet    rOPINIRole (REQUIP) 1 MG tablet    rOPINIRole (REQUIP) 5 MG tablet    simvastatin (ZOCOR) 20 MG tablet    traMADol (ULTRAM) 50 MG tablet    venlafaxine (EFFEXOR) 75 MG tablet    venlafaxine (EFFEXOR-XR) 150 MG 24 hr capsule    amphetamine-dextroamphetamine (ADDERALL) 20 MG per tablet    atenolol (TENORMIN) 25 MG tablet    famotidine (PEPCID) 20 MG tablet    mometasone (NASONEX) 50 MCG/ACT nasal spray    ranitidine (ZANTAC) 150 MG tablet    sertraline (ZOLOFT) 100 MG tablet    triamcinolone (KENALOG) 0.1 % external cream    VIIBRYD 20 MG TABS tablet     No current facility-administered medications for this visit.       Allergies:  Dust Mite Extract   Penicillins   Sulfa Drugs     Past Medical History:  Encounter for long-term current use of medication  Lymphocytic colitis   Renal insufficiency   Hypokalemia   Diastolic dysfunction   Tobacco use disorder   Shortness of breath   Sjögren's syndrome   Vitamin D deficiency   Systemic lupus erythematosus   Migraines   Overweight  Primary osteoarthritis   Esophageal reflux   Anemia, unspecified   Attention deficit disorder without mention of hyperactivity   Generalized anxiety  disorder   Episodic Mood Disorder   Hypothyroid   Hyperlipidemia    Allergic rhinitis seasonal    Hypertension     Past Surgical History:  Hysterectomy 1/1/1978 - 12/31/1978  ([place]) [place] Bone Graft Tib Fib FX W BMP 1/1/1992 - 12/31/1992    Bunionectomy, right   Knee replacement, left 1/1/2006 - 12/31/2006      Thumb(s) surgery  Knee replacement, right 6/1/2008 - 6/30/2008  Upper arm/elbow surgery 12/1/2008 - 12/31/2008       Colonoscopy screening 1/27/11   Bladder suspension 10/1/2005 - 10/31/2005       Colon surgery 6/1/2018 - 6/30/2018      Family History:    The patient's family history includes Sjogren's in her daughter; lupus (with more skin involvement) in her sister.  Son has extensive history of arterial blood clots    Social History:  The patient reports that she has quit smoking. She smoked 0.50 packs per day. She has never used smokeless tobacco. She reports current alcohol use. She reports that she does not use drugs. She is a retired .  PCP: Johnson Arnold MD  Marital Status:      Physical Exam:   /86 (BP Location: Right arm, Patient Position: Sitting, Cuff Size: Adult Regular)   Pulse 74   Wt 88 kg (194 lb)   SpO2 98%   BMI 34.37 kg/m     Wt Readings from Last 4 Encounters:   09/22/23 88 kg (194 lb)   05/20/22 93.3 kg (205 lb 11.2 oz)   09/10/21 92.7 kg (204 lb 6.4 oz)   08/24/21 93.4 kg (205 lb 14.6 oz)     Constitutional: Well-developed, appearing stated age; cooperative.  Eyes: Normal EOM, PERRLA, vision, conjunctiva, sclera.  ENT: Normal external ears, nose, hearing, lips, teeth, gums, normal saliva pool  Neck: No mass or thyroid enlargement.  Cardio: RRR, no murmurs rubs or gallops  Resp: lungs CTA bilaterally at bases of lungs, normal work of breathing  MS:  On the right hand, there is bony angulation in the knuckles more so than left hand, some swelling of the left wrist, Good range of motion of the wrist joint, no pain upon palpation of the joint space. Good fist  formation. Elbows have normal extension and flexion, no effusion. Fatty changes on left knee secondary to knee replacement surgery, normal ROM, no effusion. Hips have normal ROM bilaterally (interal, external rotation and flexion/extension), pain upon palpation of left leg around greater head of the trochanter.  Skin: No nail pitting, alopecia, rash, nodules or lesions.  Neuro: Normal cranial nerves  Psych: Normal judgement, orientation, memory, affect.     Imaging:    Laboratory:       Latest Ref Rng & Units 6/29/2020    10:15 AM 6/9/2021     7:09 AM 6/9/2022     4:53 PM   RHEUM RESULTS   ALT 0 - 50 U/L 26      AST 0 - 45 U/L 28      Complement C3 83 - 177 mg/dL   125    Complement C4 19 - 59 mg/dL   13    Creatinine 0.52 - 1.04 mg/dL 1.04      DNA-ds <10.0 IU/mL   0.9    GFR Estimate If Black >60 mL/min/[1.73_m2] 61      GFR Estimate >60 mL/min/[1.73_m2] 53      Hematocrit 35.0 - 47.0 % 41.5      Hemoglobin 12.0 - 16.0 g/dL 13.6  10.3     WBC 4.0 - 11.0 10e9/L 4.5      RBC Count 3.8 - 5.2 10e12/L 4.31      RDW 10.0 - 15.0 % 13.2      MCHC 31.5 - 36.5 g/dL 32.8      MCV 78 - 100 fl 96      Platelet Count 150 - 450 10e9/L 209        Rheumatoid Factor   Date Value Ref Range Status   06/16/2010 10 0 - 14 IU/mL Final     Ribonucleic Protein IgG Antibody   Date Value Ref Range Status   06/16/2010 0  Final     Comment:     Reference range: 0 to 40  Unit: AU/mL  (Note)  REFERENCE INTERVAL: Ribonucleic Protein (SAMANTHA), IgG   29 AU/mL or Less ............. Negative   30 - 40 AU/mL ................ Equivocal   41 AU/mL or Greater .......... Positive    RNP antibody is seen in % of mixed connective tissue  disease and is considered specific for this syndrome if  other antibodies are negative. RNP is also present in  20-30% of systemic lupus erythematosus (SLE) and 15-25%  of progressive systemic sclerosis (PSS).  Performed by [facility],  [address] [place] [id number] [phone]  [email or web address], Virgen MALAVE  MD Vinny, Lab. Director     Cordero Antibody IgG   Date Value Ref Range Status   06/16/2010 0  Final     Comment:     Reference range: 0 to 40  Unit: AU/mL  (Note)  REFERENCE INTERVALS: SMITH (SAMANTHA) Ab, IgG   29 AU/mL or Less ............. Negative   30 - 40 AU/mL ................ Equivocal   41 AU/mL or Greater .......... Positive    Cordero antibody is very specific for systemic lupus  erythematosus (SLE) but only occurs in 30-35% of SLE  cases. The presence of antibodies to Cordero is often  associated with renal disease.  Performed by [email or web address],  [address] [facility],[place] [id number] [phone]  [email or web address], Virgen Casillas MD, Lab. Director     SSA (RO) Antibody IgG   Date Value Ref Range Status   06/16/2010 4  Final     Comment:     Reference range: 0 to 40  Unit: AU/mL  (Note)  REFERENCE INTERVALS: SSA (Ro) (SAMANTHA) Ab, IgG   29 AU/mL or Less ............. Negative   30 - 40 AU/mL ................ Equivocal   41 AU/mL or Greater .......... Positive    SSA (Ro) antibody is seen in 70-75% of Sjogren syndrome  cases, 30-40% of systemic lupus erythematosus (SLE) and  5-10% of progressive systemic sclerosis (PSS).  Performed by [email or web address],  [address] [facility],[place] [id number] [phone]  [email or web address], Virgen Casillas MD, Lab. Director     SSB (LA) Antibody IgG   Date Value Ref Range Status   06/16/2010 0  Final     Comment:     Reference range: 0 to 40  Unit: AU/mL  (Note)  REFERENCE INTERVALS: SSB (La) (SAMANTHA) Ab, IgG   29 AU/mL or Less ............. Negative   30 - 40 AU/mL ................ Equivocal   41 AU/mL or Greater .......... Positive    SSB (La) antibody is seen in 50-60% of Sjogren syndrome  cases and is specific if it is the only SAMANTHA antibody  present. 15-25% of patients with systemic lupus  erythematosus (SLE) and 5-10% of patients with progressive  systemic sclerosis (PSS) also have this antibody.  Performed by [email or web address],  [address] [place] [id number]  [phone]  [email or web address], Virgen Casillas MD, Lab. Director     KATIE Screen by [facility]   Date Value Ref Range Status   06/16/2010 1.2 (H) 0 - 1.0 Final     Comment:     Interpretation:  Weakly Positive     DNA-ds   Date Value Ref Range Status   10/04/2018 1 <10 IU/mL Final     Comment:     Negative     Hep B Surface Agn   Date Value Ref Range Status   10/12/2011 Negative NEG Final     Albumin Fraction   Date Value Ref Range Status   12/22/2016 4.1 3.7 - 5.1 g/dL Final     Alpha 2 Fraction   Date Value Ref Range Status   12/22/2016 0.9 0.5 - 0.9 g/dL Final     Beta Fraction   Date Value Ref Range Status   12/22/2016 0.8 0.6 - 1.0 g/dL Final     Gamma Fraction   Date Value Ref Range Status   12/22/2016 0.8 0.7 - 1.6 g/dL Final     Monoclonal Peak   Date Value Ref Range Status   12/22/2016 0.0 0.0 g/dL Final     ELP Interpretation:   Date Value Ref Range Status   12/22/2016   Final    Essentially normal electrophoretic pattern.  No monoclonal protein seen.   Pathologic significance requires clinical correlation.  ISIDRO Murguia M.D.,   Ph.D., Pathologist ().

## 2023-09-25 LAB
C3 SERPL-MCNC: 113 MG/DL (ref 81–157)
C4 SERPL-MCNC: 12 MG/DL (ref 13–39)
CCP AB SER IA-ACNC: 1 U/ML
DSDNA AB SER-ACNC: <0.6 IU/ML
RHEUMATOID FACT SER NEPH-ACNC: 6 IU/ML

## 2023-10-05 ENCOUNTER — TRANSFERRED RECORDS (OUTPATIENT)
Dept: HEALTH INFORMATION MANAGEMENT | Facility: CLINIC | Age: 78
End: 2023-10-05
Payer: COMMERCIAL

## 2024-06-14 ENCOUNTER — OFFICE VISIT (OUTPATIENT)
Dept: RHEUMATOLOGY | Facility: CLINIC | Age: 79
End: 2024-06-14
Attending: INTERNAL MEDICINE
Payer: COMMERCIAL

## 2024-06-14 VITALS
HEART RATE: 71 BPM | WEIGHT: 193 LBS | SYSTOLIC BLOOD PRESSURE: 171 MMHG | HEIGHT: 63 IN | DIASTOLIC BLOOD PRESSURE: 74 MMHG | BODY MASS INDEX: 34.2 KG/M2

## 2024-06-14 DIAGNOSIS — M32.9 SYSTEMIC LUPUS ERYTHEMATOSUS, UNSPECIFIED SLE TYPE, UNSPECIFIED ORGAN INVOLVEMENT STATUS (H): Primary | ICD-10-CM

## 2024-06-14 PROCEDURE — 99214 OFFICE O/P EST MOD 30 MIN: CPT | Performed by: INTERNAL MEDICINE

## 2024-06-14 PROCEDURE — G0463 HOSPITAL OUTPT CLINIC VISIT: HCPCS | Performed by: INTERNAL MEDICINE

## 2024-06-14 PROCEDURE — G2211 COMPLEX E/M VISIT ADD ON: HCPCS | Performed by: INTERNAL MEDICINE

## 2024-06-14 RX ORDER — HYDROXYCHLOROQUINE SULFATE 200 MG/1
TABLET, FILM COATED ORAL
Qty: 180 TABLET | Refills: 3 | Status: SHIPPED | OUTPATIENT
Start: 2024-06-14 | End: 2024-07-15

## 2024-06-14 RX ORDER — PREDNISONE 5 MG/1
TABLET ORAL
Qty: 35 TABLET | Refills: 1 | Status: SHIPPED | OUTPATIENT
Start: 2024-06-14

## 2024-06-14 ASSESSMENT — PAIN SCALES - GENERAL: PAINLEVEL: MODERATE PAIN (5)

## 2024-06-14 NOTE — PATIENT INSTRUCTIONS
Diagnosis:  1.  Systemic lupus erythematosus: No clear symptoms or signs suggesting lupus flare, but multiple joint pain could reflect minor inflammatory process.  I recommend a course of prednisone.  Continue chronic therapy  2.  Osteoarthritis, hands  3.  Hypothyroidism  4.  Painful right fourth toe    Plan:  1.  Blood work for lupus activity, inflammation, kidney function, blood counts  2.  Prednisone 15 mg daily for 1 week, then 10 mg daily for 1 week.  3.  Continue hydroxychloroquine 200 mg twice daily, undergo annual ophthalmology evaluation for rare Plaquenil toxicity  4.  Podiatry referral for callus versus plantar wart, right toe  5.  For osteoarthritis pain, use intermittent paraffin bath, 1% Voltaren gel, acetaminophen 1000 mg up to 3 times daily, tramadol up to twice daily.

## 2024-06-14 NOTE — PROGRESS NOTES
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 Duckworth reports ongoing multifocal joint pain that is most noticeable in her R > L hands, but does not prevent her from gardening, painting, etc. She has ~1-2 hours of morning stiffness. Recently had increased symptoms after going a few days without her Plaquenil. Physical exam shows osteoarthritis changes in the right significantly more so than in the left hand; fist formation and  strength are normal. Increased splay of the phalanges of the R foot and prominence of the 5th MTP on that foot.  Forefeet are splayed and there is a exuberant callus right fourth toe    Data: 2023, rheumatoid factor and cyclic citrullinated peptide antibodies were negative; creatinine was 1.15.  CBC was normal and urine was clear.  Xray L foot 23: Comminuted mildly displaced fracture involving the distal shaft of the fifth metatarsal. Advanced arthritic change at the first metatarsophalangeal joint. Displaced fracture through the base of the proximal phalanx left third toe, age indeterminate.     Eye examPatient underwent ophthalmology evaluation in 2023.  No macular toxicity of hydroxychloroquine was noted.    Discussion: Systemic lupus erythematosus with secondary Sjogren's are generally well controlled. However, small joint symptoms and morning stiffness are potentially concerning for concurrent RA. Anatomic changes of the R foot may indicate previous inflammatory arthritis. I recommend continuing Plaquenil monotherapy 400 mg daily, as this does appear to be having a positive impact on patient's symptoms. While on the medication, patient should have  annual ophthalmology evaluation for retinal toxicity.  Recommended course of low-dose prednisone to address inflammatory component.    # Osteoarthritis, hands (R>L), wrists, shoulders, and knees:  No indication for immunomodulatory therapy. Continue regular physical exercise.    # Advanced dental caries and periodontitis: Currently following with dentistry. Drinks a lot of water and using Biotin to control symptoms.   # Right fourth toe pain: Plantar wart versus exuberant callus.      Plan:  1.  Blood work for lupus activity, inflammation, kidney function, blood counts  2.  Prednisone 15 mg daily for 1 week, then 10 mg daily for 1 week.  3.  Continue hydroxychloroquine 200 mg twice daily, undergo annual ophthalmology evaluation for rare Plaquenil toxicity  4.  Podiatry referral for callus versus plantar wart, right toe  5.  For osteoarthritis pain, use intermittent paraffin bath, 1% Voltaren gel, acetaminophen 1000 mg up to 3 times daily, tramadol up to twice daily.    Return to clinic in 7 months.     Orders:  Orders Placed This Encounter   Procedures    Complement C3    Complement C4    CRP inflammation    Routine UA with Micro Reflex to Culture    Creatinine     Follow-up: Return in about 6 months (around 3/10/2022)    On the day of the encounter, a total of 31 minutes was spent in chart review, and in counseling and coordination of care, regarding the patient's complex medical problem of systemic lupus erythematosus, osteoarthritis, hands, foot pain    The longitudinal plan of care for the diagnosis(es)/condition(s) as documented were addressed during this visit. Due to the added complexity in care, I will continue to support Daria in the subsequent management and with ongoing continuity of care.        Deng Pruett M.D.  Staff Rheumatologist, [facility]  Pager [phone]      HPI:   Shu Duckworth has a history including systemic lupus erythematosus with secondary Sjögren's syndrome (+sicca symptoms,  "+Schirmer test, -minor salivary gland biopsy) who presents for follow-up. The patient was last evaluated in 9-2023 in follow up of lupus. Symptoms were overall judged reasonably controlled. Plaquenil was continued at 400 mg a day.    Interval history June 14, 2024  Pain in R hand kuckels, fingers    R > L shoudler, R knee intermittently painful.    Pain in many toes, worse with wearing shoes    Eyes and mouth remain dry; she notes a \"film\" over both eyes. Uses Refresh AT.    L shin bruise has been long lasting, intermittently blue/black.    She relates decreased energy for household tasks and gardening recently. +episodic exhaustion    Has not used prednisone recently.  Uses tramadol up to 2 tabs daily. Also uses excedrin.   Using voltaren 1% gel.        Interval history September 22, 2023  Patient has been doing OK recently. She continues to have aching pain in her hands and feet. Since summer 2022, she has noticed that the tips of the toes of her L foot are very sensitive---sometimes she cannot even wear a sock on that food. She also has some plantar fascia pain in that foot. She has a couple hours of morning stiffness every day, but says that the worst of her pain is after she has been active. Yesterday, woke up with a trigger finger in the 3rd digit of her L hand which resolved on its own after an hour or so. She also recently had an increase in symptoms for a few days while she was off of her Plaquenil after forgetting to refill her prescription. She takes 1-2 Tramadol and usually 1-2 Tylenol per day to manage her pain. Using her electric blanket is also helpful when she is cold.     Patient reports occasional rash over the bridge of her nose and cheeks. This rash is light red, flat, and confluent. It does not itch or hurt. Patient thinks that it self-resolves after about a day. She thinks that it may be sometimes triggered by drinking beer or wine.     Patient continues to have dry mouth that has led to poor " "dentition. She is going to get new caps/crowns soon. She cannot eat dry food without water. She carries water with her everywhere and uses Biotin to control her symptoms.     Interval history May 20, 2022  She notes aching joint/muscle pain, mostly in the legs. Pain is most noticeable when walking on uneven ground and going upstairs.  She has resumed gardening this season; she \"works up a sweat\" with outdoor activity for 20 minutes.  Joints are sore in the morning for ~ 30 minutes; moist heat helps.    No foamy or frothy urine.  No bowel or bladder incontinence.  No change in strength or sensation in the arms or legs. + raynauds, stable.  No skin thickening or tightening consistent with sclerodactyly.  No symptoms consistent with dactylitis.  No red hot or swollen joints.  some joint pain.  some joint stiffness.    Dry eye mouth symtpoms are stable.  She has cataracts and will have surgery in summer 2022.  She notes gradually worsening cough (X > 3 years).    Interval history 09-:  Patient was seen in ophthalmology on August 23, 2021.  Impression was of long-term use of Plaquenil; no evidence of retinal Plaquenil toxicity. She has been on plaquenil since 2010, 400 mg/day. Today she states she is having no lupus symptoms currently. She is still having osteoarthritis in her shoulders, hands and knees. She also states she is having generalized muscle and joint pain, however the muscle pain is worse in her legs and upper thighs, this pain started in the bottom of her left leg and has moved up over the past three months, it is sharp in character. She has no personal history of blood clots, but her son has had many blood clots. She says that she had her left shoulder replaced in June of 2021, however she says her right may need to be replaced soon. She has had both knees replaced and says they need to be replaced again. She says that it takes a couple of hours for her joints to loosen up. She is also having some " neck pain that has gotten worse over the past year. Her sicca symptoms have been stable.    She says she has gained around 30 lbs back since COVID started due to her gym being shut down and not able to exercise as much.    No new or persistent headache.  Some new hair thinning.  No change in vision or hearing.  No inflammatory eye disease history.  No history of miscarriage.  No history of blood clots.  Some dry eyes or dry mouth.  No facial rash.  No photosensitive rash.  No personal or first-degree relatives with psoriasis.  No nasal or oral ulcers.  No recurrent epistaxis or hemoptysis.  No recurrent sinusitis or recurrent pneumonia.  No chest pain, some shortness of breath (since 2010).  No abdominal pain, constipation, some diarrhea.  No blood in stool or black tarry stools.  No nausea or vomiting at baseline, sometimes nauseous with diarrhea.  No foamy or frothy urine.  No bowel or bladder incontinence.  No change in strength or sensation in the arms or legs. Some history of raynauds.  No skin thickening or tightening consistent with sclerodactyly.  No symptoms consistent with dactylitis.  No red hot or swollen joints.  some joint pain.  some joint stiffness.  Muscle pain, in both legs, problems with using her legs, some weakness, falling easily. No fevers, chills, weight loss or night sweats.      Review of Systems:   Pertinent items are noted in HPI, remainder of complete ROS is negative.        Active Medications:   Current Outpatient Medications   Medication Sig Dispense Refill    Acetaminophen (TYLENOL PO) Take 1,000 mg by mouth every 6 hours as needed for mild pain or fever      atenolol (TENORMIN) 25 MG tablet 2 tablets daily One in the morning, one at bedtime.      Cholecalciferol (VITAMIN D3) 1000 units CAPS Take 1,000 Units by mouth daily      ClonAZEPAM (KLONOPIN) 0.5 MG tablet Take 1 tab during day and 2 at bedtime as needed (max of 3 tabs per 24 hours)      cyclobenzaprine (FLEXERIL) 10 MG tablet  Take 1 tablet 3 times daily as needed      fluticasone (FLONASE) 50 MCG/ACT nasal spray SPRAY 1 SPRAY INTO EACH NOSTRIL EVERY DAY  33    hydrochlorothiazide (HYDRODIURIL) 50 MG tablet Take 25 mg by mouth daily.      hydroxychloroquine (PLAQUENIL) 200 MG tablet TAKE 1 TABLET BY MOUTH TWICE A DAY. Annual eye exam/plaquenil screening for refills, overdue. Please do now. Thank you. 180 tablet 3    levothyroxine (SYNTHROID/LEVOTHROID) 100 MCG tablet Take 100 mcg by mouth daily  3    metoprolol succinate (TOPROL-XL) 50 MG 24 hr tablet Take 50 mg by mouth daily      Multiple Vitamins-Minerals (CENTRUM SILVER PO) Take  by mouth.      pantoprazole (PROTONIX) 40 MG enteric coated tablet Take 1 tablet by mouth 2 times daily      potassium chloride ER (K-DUR/KLOR-CON M) 20 MEQ CR tablet TAKE 1 TABLET BY MOUTH EVERY DAY WITH FOOD  1    ranitidine (ZANTAC) 150 MG tablet Take one at bedtime daily      rOPINIRole (REQUIP) 1 MG tablet Take 1 mg in afternoon and 5 mg at bedtime      rOPINIRole (REQUIP) 5 MG tablet TAKE 1 TABLET BY MOUTH EVERY DAY IN THE EVENING  3    sertraline (ZOLOFT) 100 MG tablet Take 150 mg by mouth every morning      simvastatin (ZOCOR) 20 MG tablet Take one tab daily      traMADol (ULTRAM) 50 MG tablet Take 50 mg by mouth every 6 hours as needed for pain      triamcinolone (KENALOG) 0.1 % external cream   0    venlafaxine (EFFEXOR) 75 MG tablet Take 75 mg by mouth 3 times daily      venlafaxine (EFFEXOR-XR) 150 MG 24 hr capsule Take 150 mg by mouth daily      VIIBRYD 20 MG TABS tablet 20 mg by Oral or Feeding Tube route daily      amphetamine-dextroamphetamine (ADDERALL) 20 MG per tablet Take 30 mg by mouth 2 times daily  (Patient not taking: Reported on 9/22/2023)      famotidine (PEPCID) 20 MG tablet Take 20 mg by mouth (Patient not taking: Reported on 9/22/2023)      mometasone (NASONEX) 50 MCG/ACT nasal spray 1 spray in each nostril once daily (Patient not taking: Reported on 9/10/2021)       No current  "facility-administered medications for this visit.         Allergies:  Dust Mite Extract   Penicillins   Sulfa Drugs     Past Medical History:  Encounter for long-term current use of medication  Lymphocytic colitis   Renal insufficiency   Hypokalemia   Diastolic dysfunction   Tobacco use disorder   Shortness of breath   Sjögren's syndrome   Vitamin D deficiency   Systemic lupus erythematosus   Migraines   Overweight  Primary osteoarthritis   Esophageal reflux   Anemia, unspecified   Attention deficit disorder without mention of hyperactivity   Generalized anxiety disorder   Episodic Mood Disorder   Hypothyroid   Hyperlipidemia    Allergic rhinitis seasonal    Hypertension     Past Surgical History:  Hysterectomy 1/1/1978 - 12/31/1978  (IA) NC Bone Graft Tib Fib FX W BMP 1/1/1992 - 12/31/1992    Bunionectomy, right   Knee replacement, left 1/1/2006 - 12/31/2006      Thumb(s) surgery  Knee replacement, right 6/1/2008 - 6/30/2008  Upper arm/elbow surgery 12/1/2008 - 12/31/2008       Colonoscopy screening 1/27/11   Bladder suspension 10/1/2005 - 10/31/2005       Colon surgery 6/1/2018 - 6/30/2018      Family History:    The patient's family history includes Sjogren's in her daughter; lupus (with more skin involvement) in her sister.  Son has extensive history of arterial blood clots    Social History:  The patient reports that she has quit smoking. She smoked 0.50 packs per day. She has never used smokeless tobacco. She reports current alcohol use. She reports that she does not use drugs. She is a retired .  PCP: Johnson Arnold MD  Marital Status:      Physical Exam:   BP (!) 171/74   Pulse 71   Ht 1.6 m (5' 3\")   Wt 87.5 kg (193 lb)   BMI 34.19 kg/m     Wt Readings from Last 4 Encounters:   06/14/24 87.5 kg (193 lb)   09/22/23 88 kg (194 lb)   05/20/22 93.3 kg (205 lb 11.2 oz)   09/10/21 92.7 kg (204 lb 6.4 oz)     Constitutional: Well-developed, appearing stated age; cooperative.  Eyes: " Normal EOM, PERRLA, conjunctiva, sclera.  ENT: Normal external ears, nose, hearing, lips, gums; dentition poor; fissures over the tongue; moderate decrease in saliva pool  Neck: No visible mass or thyroid enlargement.  Resp: normal work of breathing  MS:  On the right hand, there is bony angulation in the PIP and DIP knuckles more so than left hand. Good range of motion of the wrist joint, no pain upon palpation of the joint space. Good fist formation. Elbows have normal extension and flexion, no effusion. Fatty changes on left knee secondary to knee replacement surgery, normal ROM, no effusion. Greater splay apparent between the phalanges and digits of the R foot than the L; R 5th digit MTP more prominent than L; fixed flexion of the 4th digit DIPs bilaterally.   Skin: No nail pitting, alopecia, rash, nodules or lesions.  Neuro: Grossly normal cranial nerves  Psych: Normal judgement, orientation, memory, affect.     Imaging:    Laboratory:       Latest Ref Rng & Units 6/9/2021     7:09 AM 6/9/2022     4:53 PM 9/22/2023     2:45 PM   RHEUM RESULTS   Complement C3 81 - 157 mg/dL  125  113    Complement C4 13 - 39 mg/dL  13  12    Creatinine 0.51 - 0.95 mg/dL   1.15    DNA-ds <10.0 IU/mL  0.9  <0.6    GFR Estimate >60 mL/min/1.73m2   49    Hematocrit 35.0 - 47.0 %   39.6    Hemoglobin 11.7 - 15.7 g/dL 10.3   12.5    WBC 4.0 - 11.0 10e3/uL   5.4    RBC Count 3.80 - 5.20 10e6/uL   4.13    RDW 10.0 - 15.0 %   13.2    MCHC 31.5 - 36.5 g/dL   31.6    MCV 78 - 100 fL   96    Platelet Count 150 - 450 10e3/uL   193    Rheumatoid Factor <12 IU/mL   6      Rheumatoid Factor   Date Value Ref Range Status   06/16/2010 10 0 - 14 IU/mL Final     Ribonucleic Protein IgG Antibody   Date Value Ref Range Status   06/16/2010 0  Final     Comment:     Reference range: 0 to 40  Unit: AU/mL  (Note)  REFERENCE INTERVAL: Ribonucleic Protein (SAMANTHA), IgG   29 AU/mL or Less ............. Negative   30 - 40 AU/mL ................ Equivocal   41  AU/mL or Greater .......... Positive    RNP antibody is seen in % of mixed connective tissue  disease and is considered specific for this syndrome if  other antibodies are negative. RNP is also present in  20-30% of systemic lupus erythematosus (SLE) and 15-25%  of progressive systemic sclerosis (PSS).  Performed by [facility],  [address], [place] [address] [phone]  [email or web address], Virgen Casillas MD, Lab. Director     Cordero Antibody IgG   Date Value Ref Range Status   06/16/2010 0  Final     Comment:     Reference range: 0 to 40  Unit: AU/mL  (Note)  REFERENCE INTERVALS: SMITH (SAMANTHA) Ab, IgG   29 AU/mL or Less ............. Negative   30 - 40 AU/mL ................ Equivocal   41 AU/mL or Greater .......... Positive    Cordero antibody is very specific for systemic lupus  erythematosus (SLE) but only occurs in 30-35% of SLE  cases. The presence of antibodies to Cordero is often  associated with renal disease.  Performed by [facility],  [address], [place] [address] [phone]  [email or web address], Virgen Casillas MD, Lab. Director     SSA (RO) Antibody IgG   Date Value Ref Range Status   06/16/2010 4  Final     Comment:     Reference range: 0 to 40  Unit: AU/mL  (Note)  REFERENCE INTERVALS: SSA (Ro) (SAMANTHA) Ab, IgG   29 AU/mL or Less ............. Negative   30 - 40 AU/mL ................ Equivocal   41 AU/mL or Greater .......... Positive    SSA (Ro) antibody is seen in 70-75% of Sjogren syndrome  cases, 30-40% of systemic lupus erythematosus (SLE) and  5-10% of progressive systemic sclerosis (PSS).  Performed by [facility],  [address], [place] [address] [phone]  [email or web address], Virgen Casillas MD, Lab. Director     SSB (LA) Antibody IgG   Date Value Ref Range Status   06/16/2010 0  Final     Comment:     Reference range: 0 to 40  Unit: AU/mL  (Note)  REFERENCE INTERVALS: SSB (La) (SAMANTHA) Ab, IgG   29 AU/mL or Less ............. Negative   30 - 40 AU/mL ................  Equivocal   41 AU/mL or Greater .......... Positive    SSB (La) antibody is seen in 50-60% of Sjogren syndrome  cases and is specific if it is the only SAMANTHA antibody  present. 15-25% of patients with systemic lupus  erythematosus (SLE) and 5-10% of patients with progressive  systemic sclerosis (PSS) also have this antibody.  Performed by [employer],  [address] [place] [address] [phone]  [email or web address], Virgen Casillas MD, Lab. Director     KATIE Screen by [employer]   Date Value Ref Range Status   2010 1.2 (H) 0 - 1.0 Final     Comment:     Interpretation:  Weakly Positive     DNA-ds   Date Value Ref Range Status   10/04/2018 1 <10 IU/mL Final     Comment:     Negative     Hep B Surface Agn   Date Value Ref Range Status   10/12/2011 Negative NEG Final     Albumin Fraction   Date Value Ref Range Status   2016 4.1 3.7 - 5.1 g/dL Final     Alpha 2 Fraction   Date Value Ref Range Status   2016 0.9 0.5 - 0.9 g/dL Final     Beta Fraction   Date Value Ref Range Status   2016 0.8 0.6 - 1.0 g/dL Final     Gamma Fraction   Date Value Ref Range Status   2016 0.8 0.7 - 1.6 g/dL Final     Monoclonal Peak   Date Value Ref Range Status   2016 0.0 0.0 g/dL Final     ELP Interpretation:   Date Value Ref Range Status   2016   Final    Essentially normal electrophoretic pattern.  No monoclonal protein seen.   Pathologic significance requires clinical correlation.  ISIDRO Murguia M.D.,   Ph.D., Pathologist ().       [facility]  Rheumatology Clinic  Deng Pruett MD  2024     Name: Shu Duckworth  MRN: [id number]  Age: 78 year old  : 1945  Referring provider: Referred Self    Assessment and Plan:  # Systemic lupus erythematosus with secondary Sjögren's syndrome (malar rash, arthralgia, sicca symptoms, +KATIE, +dsDNA, hypocomplementemia, +Schirmer's test):  Mrs. Duckworth reports ongoing diffuse joint pain, low-grade in character, and improved with activity.  She  reports maintaining of vigorous physical activity schedule with daily gardening despite the pain.  Physical exam shows osteoarthritis changes in the right significantly more so than in the left hand fist formation and  strength are normal.    Laboratory evaluation in June 2021 revealed hemoglobin of 10.3; urinalysis was clear; creatinine was 1.11 with a GFR of 58.    Systemic lupus erythematosus is quiescent. I recommend continuing Plaquenil monotherapy 400 mg daily. While on the medication, patient should have annual ophthalmology evaluation for retinal toxicity. Most recent exam per her report was conducted in August 2021:    Plan:  1.  Continue Plaquenil 400 mg daily.  While on Plaquenil, undergo annual ophthalmology evaluation for rare retinal toxicity.  2.  Repeat urinalysis, complete blood count, and creatinine at your convenience.  3.  Continue acetaminophen 1000 mg to 2000 mg daily for residual joint pain.  4.  Continue aerobic exercise daily to maintain joint and muscle health.  5. Continue symptomatic treatment for mild sicca and dry eye symptoms.  6. Agree that weight loss, diet and exercise will all help with joint symptoms      # L leg swelling: No further swelling, however she is having migratory pain that is working its way up her leg; doppler ultrasounds of the left lower leg to rule out thrombosis in 12-19 were negative. Physical exam shows no joint space pathology. Trochanteric pain syndrome is most likely due to bursitis/tenosynovitis/muscle pain and will likely improve spontaneosly over time. She was advised to continue use of tylenol and tramadol for pain and exercise/weight loss.  I recommend heat 20 minutes twice daily, gentle stretching and exercises for the lateral thigh.  Corticosteroid injection could be supplied if improvement is not occurring steadily in the next 3 to 4 weeks. Use acetaminophen 500 to 1000 mg up to 3 times daily for joint pain.      # Osteoarthritis, thumbs:  No  indication for immunomodulatory therapy.  # Ulnar neuropathy, L;  Symptoms reflect post-operative change, following L elbow replacement in 2018. Stable.  #Advanced dental caries and periodontitis: I emphasized the importance of dental hygiene to control of systemic inflammatory symptoms.  I recommended evaluation in dentistry.    Return to clinic in 6 months.     Orders:  Orders Placed This Encounter   Procedures    Complement C3    Complement C4    CRP inflammation    Routine UA with Micro Reflex to Culture    Creatinine     Follow-up: Return in about 6 months (around 3/10/2022)    I was present with the medical student who participated in the service and in the documentation of the note. I have verified the history and personally performed the physical exam and medical decision making. I agree with the assessment and plan of care as documented in the note.    Deng Pruett M.D.  Staff Rheumatologist, [facility]  Pager [phone]      HPI:   Shu Duckworth has a history including systemic lupus erythematosus with secondary Sjögren's syndrome (+sicca symptoms, +Schirmer test, -minor salivary gland biopsy) who presents for follow-up. The patient was last evaluated in 6-2020 in follow up of lupus. Symptoms were overall judged reasonably controlled on Plaquenil. Plaquenil was continued at 400 mg a day.    Interval history September 22, 2023      Interval history 09-:  Patient was seen in ophthalmology on August 23, 2021.  Impression was of long-term use of Plaquenil; no evidence of retinal Plaquenil toxicity. She has been on plaquenil since 2010, 400 mg/day. Today she states she is having no lupus symptoms currently. She is still having osteoarthritis in her shoulders, hands and knees. She also states she is having generalized muscle and joint pain, however the muscle pain is worse in her legs and upper thighs, this pain started in the bottom of her left leg and has moved up over the past three months, it is  sharp in character. She has no personal history of blood clots, but her son has had many blood clots. She says that she had her left shoulder replaced in June of 2021, however she says her right may need to be replaced soon. She has had both knees replaced and says they need to be replaced again. She says that it takes a couple of hours for her joints to loosen up. She is also having some neck pain that has gotten worse over the past year. Her sicca symptoms have been stable.    She says she has gained around 30 lbs back since COVID started due to her gym being shut down and not able to exercise as much.      No new or persistent headache.  Some new hair thinning.  No change in vision or hearing.  No inflammatory eye disease history.  No history of miscarriage.  No history of blood clots.  Some dry eyes or dry mouth.  No facial rash.  No photosensitive rash.  No personal or first-degree relatives with psoriasis.  No nasal or oral ulcers.  No recurrent epistaxis or hemoptysis.  No recurrent sinusitis or recurrent pneumonia.  No chest pain, some shortness of breath (since 2010).  No abdominal pain, constipation, some diarrhea.  No blood in stool or black tarry stools.  No nausea or vomiting at baseline, sometimes nauseous with diarrhea.  No foamy or frothy urine.  No bowel or bladder incontinence.  No change in strength or sensation in the arms or legs. Some history of raynauds.  No skin thickening or tightening consistent with sclerodactyly.  No symptoms consistent with dactylitis.  No red hot or swollen joints.  some joint pain.  some joint stiffness.  Muscle pain, in both legs, problems with using her legs, some weakness, falling easily. No fevers, chills, weight loss or night sweats.      Interval history 06-:    She continues with diffuse pain; the sides of her feet, muscles, and joints.   She notes swelling in her R hand, constant, not affected by activity.     She has been gardening frequently; she is  able to grasp and pull weeds. She opens jars fine.  She takes acetaminopehn 500 mg 2-4 tabs per day. +significant relief from joint pain, temporary.    She notes some balance/insteadiness with getting up from a chair, or with rapid turning. No vertigo; minimal dizziness normally.       Review of Systems:   Pertinent items are noted in HPI, remainder of complete ROS is negative.        Active Medications:   Current Outpatient Medications   Medication Sig Dispense Refill    Acetaminophen (TYLENOL PO) Take 1,000 mg by mouth every 6 hours as needed for mild pain or fever      atenolol (TENORMIN) 25 MG tablet 2 tablets daily One in the morning, one at bedtime.      Cholecalciferol (VITAMIN D3) 1000 units CAPS Take 1,000 Units by mouth daily      ClonAZEPAM (KLONOPIN) 0.5 MG tablet Take 1 tab during day and 2 at bedtime as needed (max of 3 tabs per 24 hours)      cyclobenzaprine (FLEXERIL) 10 MG tablet Take 1 tablet 3 times daily as needed      fluticasone (FLONASE) 50 MCG/ACT nasal spray SPRAY 1 SPRAY INTO EACH NOSTRIL EVERY DAY  33    hydrochlorothiazide (HYDRODIURIL) 50 MG tablet Take 25 mg by mouth daily.      hydroxychloroquine (PLAQUENIL) 200 MG tablet TAKE 1 TABLET BY MOUTH TWICE A DAY. Annual eye exam/plaquenil screening for refills, overdue. Please do now. Thank you. 180 tablet 3    levothyroxine (SYNTHROID/LEVOTHROID) 100 MCG tablet Take 100 mcg by mouth daily  3    metoprolol succinate (TOPROL-XL) 50 MG 24 hr tablet Take 50 mg by mouth daily      Multiple Vitamins-Minerals (CENTRUM SILVER PO) Take  by mouth.      pantoprazole (PROTONIX) 40 MG enteric coated tablet Take 1 tablet by mouth 2 times daily      potassium chloride ER (K-DUR/KLOR-CON M) 20 MEQ CR tablet TAKE 1 TABLET BY MOUTH EVERY DAY WITH FOOD  1    ranitidine (ZANTAC) 150 MG tablet Take one at bedtime daily      rOPINIRole (REQUIP) 1 MG tablet Take 1 mg in afternoon and 5 mg at bedtime      rOPINIRole (REQUIP) 5 MG tablet TAKE 1 TABLET BY MOUTH  EVERY DAY IN THE EVENING  3    sertraline (ZOLOFT) 100 MG tablet Take 150 mg by mouth every morning      simvastatin (ZOCOR) 20 MG tablet Take one tab daily      traMADol (ULTRAM) 50 MG tablet Take 50 mg by mouth every 6 hours as needed for pain      triamcinolone (KENALOG) 0.1 % external cream   0    venlafaxine (EFFEXOR) 75 MG tablet Take 75 mg by mouth 3 times daily      venlafaxine (EFFEXOR-XR) 150 MG 24 hr capsule Take 150 mg by mouth daily      VIIBRYD 20 MG TABS tablet 20 mg by Oral or Feeding Tube route daily      amphetamine-dextroamphetamine (ADDERALL) 20 MG per tablet Take 30 mg by mouth 2 times daily  (Patient not taking: Reported on 9/22/2023)      famotidine (PEPCID) 20 MG tablet Take 20 mg by mouth (Patient not taking: Reported on 9/22/2023)      mometasone (NASONEX) 50 MCG/ACT nasal spray 1 spray in each nostril once daily (Patient not taking: Reported on 9/10/2021)       No current facility-administered medications for this visit.       Allergies:  Dust Mite Extract   Penicillins   Sulfa Drugs     Past Medical History:  Encounter for long-term current use of medication  Lymphocytic colitis   Renal insufficiency   Hypokalemia   Diastolic dysfunction   Tobacco use disorder   Shortness of breath   Sjögren's syndrome   Vitamin D deficiency   Systemic lupus erythematosus   Migraines   Overweight  Primary osteoarthritis   Esophageal reflux   Anemia, unspecified   Attention deficit disorder without mention of hyperactivity   Generalized anxiety disorder   Episodic Mood Disorder   Hypothyroid   Hyperlipidemia    Allergic rhinitis seasonal    Hypertension     Past Surgical History:  Hysterectomy 1/1/1978 - 12/31/1978  ([place]) MA Bone Graft Tib Fib FX W BMP 1/1/1992 - 12/31/1992    Bunionectomy, right   Knee replacement, left 1/1/2006 - 12/31/2006      Thumb(s) surgery  Knee replacement, right 6/1/2008 - 6/30/2008  Upper arm/elbow surgery 12/1/2008 - 12/31/2008       Colonoscopy screening 1/27/11   Bladder  "suspension 10/1/2005 - 10/31/2005       Colon surgery 6/1/2018 - 6/30/2018      Family History:    The patient's family history includes Sjogren's in her daughter; lupus (with more skin involvement) in her sister.  Son has extensive history of arterial blood clots    Social History:  The patient reports that she has quit smoking. She smoked 0.50 packs per day. She has never used smokeless tobacco. She reports current alcohol use. She reports that she does not use drugs. She is a retired .  PCP: Johnson Arnold MD  Marital Status:      Physical Exam:   BP (!) 171/74   Pulse 71   Ht 1.6 m (5' 3\")   Wt 87.5 kg (193 lb)   BMI 34.19 kg/m     Wt Readings from Last 4 Encounters:   06/14/24 87.5 kg (193 lb)   09/22/23 88 kg (194 lb)   05/20/22 93.3 kg (205 lb 11.2 oz)   09/10/21 92.7 kg (204 lb 6.4 oz)     Constitutional: Well-developed, appearing stated age; cooperative.  Eyes: Normal EOM, PERRLA, vision, conjunctiva, sclera.  ENT: Normal external ears, nose, hearing, lips, teeth, gums, normal saliva pool  Neck: No mass or thyroid enlargement.  Cardio: RRR, no murmurs rubs or gallops  Resp: lungs CTA bilaterally at bases of lungs, normal work of breathing  MS:  On the right hand, there is bony angulation in the knuckles more so than left hand, some swelling of the left wrist, Good range of motion of the wrist joint, no pain upon palpation of the joint space. Good fist formation. Elbows have normal extension and flexion, no effusion. Fatty changes on left knee secondary to knee replacement surgery, normal ROM, no effusion. Hips have normal ROM bilaterally (interal, external rotation and flexion/extension), pain upon palpation of left leg around greater head of the trochanter.  Skin: No nail pitting, alopecia, rash, nodules or lesions.  Neuro: Normal cranial nerves  Psych: Normal judgement, orientation, memory, affect.     Imaging:    Laboratory:       Latest Ref Rng & Units 6/9/2021     7:09 AM " 6/9/2022     4:53 PM 9/22/2023     2:45 PM   RHEUM RESULTS   Complement C3 81 - 157 mg/dL  125  113    Complement C4 13 - 39 mg/dL  13  12    Creatinine 0.51 - 0.95 mg/dL   1.15    DNA-ds <10.0 IU/mL  0.9  <0.6    GFR Estimate >60 mL/min/1.73m2   49    Hematocrit 35.0 - 47.0 %   39.6    Hemoglobin 11.7 - 15.7 g/dL 10.3   12.5    WBC 4.0 - 11.0 10e3/uL   5.4    RBC Count 3.80 - 5.20 10e6/uL   4.13    RDW 10.0 - 15.0 %   13.2    MCHC 31.5 - 36.5 g/dL   31.6    MCV 78 - 100 fL   96    Platelet Count 150 - 450 10e3/uL   193    Rheumatoid Factor <12 IU/mL   6      Rheumatoid Factor   Date Value Ref Range Status   06/16/2010 10 0 - 14 IU/mL Final     Ribonucleic Protein IgG Antibody   Date Value Ref Range Status   06/16/2010 0  Final     Comment:     Reference range: 0 to 40  Unit: AU/mL  (Note)  REFERENCE INTERVAL: Ribonucleic Protein (SAMANTHA), IgG   29 AU/mL or Less ............. Negative   30 - 40 AU/mL ................ Equivocal   41 AU/mL or Greater .......... Positive    RNP antibody is seen in % of mixed connective tissue  disease and is considered specific for this syndrome if  other antibodies are negative. RNP is also present in  20-30% of systemic lupus erythematosus (SLE) and 15-25%  of progressive systemic sclerosis (PSS).  Performed by [facility],  [address] [place] [id number] [phone]  [email or web address], Virgen Casillas MD, Lab. Director     Cordero Antibody IgG   Date Value Ref Range Status   06/16/2010 0  Final     Comment:     Reference range: 0 to 40  Unit: AU/mL  (Note)  REFERENCE INTERVALS: SMITH (SAMANTHA) Ab, IgG   29 AU/mL or Less ............. Negative   30 - 40 AU/mL ................ Equivocal   41 AU/mL or Greater .......... Positive    Cordero antibody is very specific for systemic lupus  erythematosus (SLE) but only occurs in 30-35% of SLE  cases. The presence of antibodies to Cordero is often  associated with renal disease.  Performed by [facility],  [address] [place] [id number]  [phone]  [email or web address], Virgen Casillas MD, Lab. Director     SSA (RO) Antibody IgG   Date Value Ref Range Status   06/16/2010 4  Final     Comment:     Reference range: 0 to 40  Unit: AU/mL  (Note)  REFERENCE INTERVALS: SSA (Ro) (SAMANTHA) Ab, IgG   29 AU/mL or Less ............. Negative   30 - 40 AU/mL ................ Equivocal   41 AU/mL or Greater .......... Positive    SSA (Ro) antibody is seen in 70-75% of Sjogren syndrome  cases, 30-40% of systemic lupus erythematosus (SLE) and  5-10% of progressive systemic sclerosis (PSS).  Performed by [facility],  [address] [facility] [facility],[place] [id number] [phone]  [email or web address], Virgen Casillas MD, Lab. Director     SSB (LA) Antibody IgG   Date Value Ref Range Status   06/16/2010 0  Final     Comment:     Reference range: 0 to 40  Unit: AU/mL  (Note)  REFERENCE INTERVALS: SSB (La) (SAMANTHA) Ab, IgG   29 AU/mL or Less ............. Negative   30 - 40 AU/mL ................ Equivocal   41 AU/mL or Greater .......... Positive    SSB (La) antibody is seen in 50-60% of Sjogren syndrome  cases and is specific if it is the only SAMANTHA antibody  present. 15-25% of patients with systemic lupus  erythematosus (SLE) and 5-10% of patients with progressive  systemic sclerosis (PSS) also have this antibody.  Performed by [facility],  [address] [facility],[place] [id number] [phone]  [email or web address], Virgen Casillas MD, Lab. Director     KATIE Screen by [facility]   Date Value Ref Range Status   06/16/2010 1.2 (H) 0 - 1.0 Final     Comment:     Interpretation:  Weakly Positive     DNA-ds   Date Value Ref Range Status   10/04/2018 1 <10 IU/mL Final     Comment:     Negative     Hep B Surface Agn   Date Value Ref Range Status   10/12/2011 Negative NEG Final     Albumin Fraction   Date Value Ref Range Status   12/22/2016 4.1 3.7 - 5.1 g/dL Final     Alpha 2 Fraction   Date Value Ref Range Status   12/22/2016 0.9 0.5 - 0.9 g/dL Final     Beta Fraction   Date Value Ref Range Status    12/22/2016 0.8 0.6 - 1.0 g/dL Final     Gamma Fraction   Date Value Ref Range Status   12/22/2016 0.8 0.7 - 1.6 g/dL Final     Monoclonal Peak   Date Value Ref Range Status   12/22/2016 0.0 0.0 g/dL Final     ELP Interpretation:   Date Value Ref Range Status   12/22/2016   Final    Essentially normal electrophoretic pattern.  No monoclonal protein seen.   Pathologic significance requires clinical correlation.  ISIDRO Murguia M.D.,   Ph.D., Pathologist ().

## 2024-06-14 NOTE — NURSING NOTE
"Chief Complaint   Patient presents with    Follow Up     6 month       BP (!) 171/74   Pulse 71   Ht 1.6 m (5' 3\")   Wt 87.5 kg (193 lb)   BMI 34.19 kg/m    Sweta Mcpherson MA on 6/14/2024 at 10:23 AM    "

## 2024-06-14 NOTE — LETTER
2024       RE: Shu Duckworth  [address] [facility] [address]     Dear Colleague,    Thank you for referring your patient, Shu Duckworth, to the [facility] RHEUMATOLOGY CLINIC [place] at [facility]. Please see a copy of my visit note below.    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 Duckworth reports ongoing multifocal joint pain that is most noticeable in her R > L hands, but does not prevent her from gardening, painting, etc. She has ~1-2 hours of morning stiffness. Recently had increased symptoms after going a few days without her Plaquenil. Physical exam shows osteoarthritis changes in the right significantly more so than in the left hand; fist formation and  strength are normal. Increased splay of the phalanges of the R foot and prominence of the 5th MTP on that foot.  Forefeet are splayed and there is a exuberant callus right fourth toe    Data: 2023, rheumatoid factor and cyclic citrullinated peptide antibodies were negative; creatinine was 1.15.  CBC was normal and urine was clear.  Xray L foot 23: Comminuted mildly displaced fracture involving the distal shaft of the fifth metatarsal. Advanced arthritic change at the first metatarsophalangeal joint. Displaced fracture through the base of the proximal phalanx left third toe, age indeterminate.     Eye examPatient underwent ophthalmology evaluation in 2023.  No macular toxicity of hydroxychloroquine was noted.    Discussion: Systemic lupus erythematosus with secondary Sjogren's are generally well controlled. However, small joint  symptoms and morning stiffness are potentially concerning for concurrent RA. Anatomic changes of the R foot may indicate previous inflammatory arthritis. I recommend continuing Plaquenil monotherapy 400 mg daily, as this does appear to be having a positive impact on patient's symptoms. While on the medication, patient should have annual ophthalmology evaluation for retinal toxicity.  Recommended course of low-dose prednisone to address inflammatory component.    # Osteoarthritis, hands (R>L), wrists, shoulders, and knees:  No indication for immunomodulatory therapy. Continue regular physical exercise.    # Advanced dental caries and periodontitis: Currently following with dentistry. Drinks a lot of water and using Biotin to control symptoms.   # Right fourth toe pain: Plantar wart versus exuberant callus.      Plan:  1.  Blood work for lupus activity, inflammation, kidney function, blood counts  2.  Prednisone 15 mg daily for 1 week, then 10 mg daily for 1 week.  3.  Continue hydroxychloroquine 200 mg twice daily, undergo annual ophthalmology evaluation for rare Plaquenil toxicity  4.  Podiatry referral for callus versus plantar wart, right toe  5.  For osteoarthritis pain, use intermittent paraffin bath, 1% Voltaren gel, acetaminophen 1000 mg up to 3 times daily, tramadol up to twice daily.    Return to clinic in 7 months.     Orders:  Orders Placed This Encounter   Procedures     Complement C3     Complement C4     CRP inflammation     Routine UA with Micro Reflex to Culture     Creatinine     Follow-up: Return in about 6 months (around 3/10/2022)    On the day of the encounter, a total of 31 minutes was spent in chart review, and in counseling and coordination of care, regarding the patient's complex medical problem of systemic lupus erythematosus, osteoarthritis, hands, foot pain    The longitudinal plan of care for the diagnosis(es)/condition(s) as documented were addressed during this visit. Due to the added  "complexity in care, I will continue to support Daria in the subsequent management and with ongoing continuity of care.        Deng Pruett M.D.  Staff Rheumatologist, [facility]  Pager [phone]      HPI:   Shu Duckworth has a history including systemic lupus erythematosus with secondary Sjögren's syndrome (+sicca symptoms, +Schirmer test, -minor salivary gland biopsy) who presents for follow-up. The patient was last evaluated in 9-2023 in follow up of lupus. Symptoms were overall judged reasonably controlled. Plaquenil was continued at 400 mg a day.    Interval history June 14, 2024  Pain in R hand kuckels, fingers    R > L shoudler, R knee intermittently painful.    Pain in many toes, worse with wearing shoes    Eyes and mouth remain dry; she notes a \"film\" over both eyes. Uses Refresh AT.    L shin bruise has been long lasting, intermittently blue/black.    She relates decreased energy for household tasks and gardening recently. +episodic exhaustion    Has not used prednisone recently.  Uses tramadol up to 2 tabs daily. Also uses excedrin.   Using voltaren 1% gel.        Interval history September 22, 2023  Patient has been doing OK recently. She continues to have aching pain in her hands and feet. Since summer 2022, she has noticed that the tips of the toes of her L foot are very sensitive---sometimes she cannot even wear a sock on that food. She also has some plantar fascia pain in that foot. She has a couple hours of morning stiffness every day, but says that the worst of her pain is after she has been active. Yesterday, woke up with a trigger finger in the 3rd digit of her L hand which resolved on its own after an hour or so. She also recently had an increase in symptoms for a few days while she was off of her Plaquenil after forgetting to refill her prescription. She takes 1-2 Tramadol and usually 1-2 Tylenol per day to manage her pain. Using her electric blanket is also helpful when she is cold. " "    Patient reports occasional rash over the bridge of her nose and cheeks. This rash is light red, flat, and confluent. It does not itch or hurt. Patient thinks that it self-resolves after about a day. She thinks that it may be sometimes triggered by drinking beer or wine.     Patient continues to have dry mouth that has led to poor dentition. She is going to get new caps/crowns soon. She cannot eat dry food without water. She carries water with her everywhere and uses Biotin to control her symptoms.     Interval history May 20, 2022  She notes aching joint/muscle pain, mostly in the legs. Pain is most noticeable when walking on uneven ground and going upstairs.  She has resumed gardening this season; she \"works up a sweat\" with outdoor activity for 20 minutes.  Joints are sore in the morning for ~ 30 minutes; moist heat helps.    No foamy or frothy urine.  No bowel or bladder incontinence.  No change in strength or sensation in the arms or legs. + raynauds, stable.  No skin thickening or tightening consistent with sclerodactyly.  No symptoms consistent with dactylitis.  No red hot or swollen joints.  some joint pain.  some joint stiffness.    Dry eye mouth symtpoms are stable.  She has cataracts and will have surgery in summer 2022.  She notes gradually worsening cough (X > 3 years).    Interval history 09-:  Patient was seen in ophthalmology on August 23, 2021.  Impression was of long-term use of Plaquenil; no evidence of retinal Plaquenil toxicity. She has been on plaquenil since 2010, 400 mg/day. Today she states she is having no lupus symptoms currently. She is still having osteoarthritis in her shoulders, hands and knees. She also states she is having generalized muscle and joint pain, however the muscle pain is worse in her legs and upper thighs, this pain started in the bottom of her left leg and has moved up over the past three months, it is sharp in character. She has no personal history of blood " clots, but her son has had many blood clots. She says that she had her left shoulder replaced in June of 2021, however she says her right may need to be replaced soon. She has had both knees replaced and says they need to be replaced again. She says that it takes a couple of hours for her joints to loosen up. She is also having some neck pain that has gotten worse over the past year. Her sicca symptoms have been stable.    She says she has gained around 30 lbs back since COVID started due to her gym being shut down and not able to exercise as much.    No new or persistent headache.  Some new hair thinning.  No change in vision or hearing.  No inflammatory eye disease history.  No history of miscarriage.  No history of blood clots.  Some dry eyes or dry mouth.  No facial rash.  No photosensitive rash.  No personal or first-degree relatives with psoriasis.  No nasal or oral ulcers.  No recurrent epistaxis or hemoptysis.  No recurrent sinusitis or recurrent pneumonia.  No chest pain, some shortness of breath (since 2010).  No abdominal pain, constipation, some diarrhea.  No blood in stool or black tarry stools.  No nausea or vomiting at baseline, sometimes nauseous with diarrhea.  No foamy or frothy urine.  No bowel or bladder incontinence.  No change in strength or sensation in the arms or legs. Some history of raynauds.  No skin thickening or tightening consistent with sclerodactyly.  No symptoms consistent with dactylitis.  No red hot or swollen joints.  some joint pain.  some joint stiffness.  Muscle pain, in both legs, problems with using her legs, some weakness, falling easily. No fevers, chills, weight loss or night sweats.      Review of Systems:   Pertinent items are noted in HPI, remainder of complete ROS is negative.        Active Medications:   Current Outpatient Medications   Medication Sig Dispense Refill     Acetaminophen (TYLENOL PO) Take 1,000 mg by mouth every 6 hours as needed for mild pain or fever        atenolol (TENORMIN) 25 MG tablet 2 tablets daily One in the morning, one at bedtime.       Cholecalciferol (VITAMIN D3) 1000 units CAPS Take 1,000 Units by mouth daily       ClonAZEPAM (KLONOPIN) 0.5 MG tablet Take 1 tab during day and 2 at bedtime as needed (max of 3 tabs per 24 hours)       cyclobenzaprine (FLEXERIL) 10 MG tablet Take 1 tablet 3 times daily as needed       fluticasone (FLONASE) 50 MCG/ACT nasal spray SPRAY 1 SPRAY INTO EACH NOSTRIL EVERY DAY  33     hydrochlorothiazide (HYDRODIURIL) 50 MG tablet Take 25 mg by mouth daily.       hydroxychloroquine (PLAQUENIL) 200 MG tablet TAKE 1 TABLET BY MOUTH TWICE A DAY. Annual eye exam/plaquenil screening for refills, overdue. Please do now. Thank you. 180 tablet 3     levothyroxine (SYNTHROID/LEVOTHROID) 100 MCG tablet Take 100 mcg by mouth daily  3     metoprolol succinate (TOPROL-XL) 50 MG 24 hr tablet Take 50 mg by mouth daily       Multiple Vitamins-Minerals (CENTRUM SILVER PO) Take  by mouth.       pantoprazole (PROTONIX) 40 MG enteric coated tablet Take 1 tablet by mouth 2 times daily       potassium chloride ER (K-DUR/KLOR-CON M) 20 MEQ CR tablet TAKE 1 TABLET BY MOUTH EVERY DAY WITH FOOD  1     ranitidine (ZANTAC) 150 MG tablet Take one at bedtime daily       rOPINIRole (REQUIP) 1 MG tablet Take 1 mg in afternoon and 5 mg at bedtime       rOPINIRole (REQUIP) 5 MG tablet TAKE 1 TABLET BY MOUTH EVERY DAY IN THE EVENING  3     sertraline (ZOLOFT) 100 MG tablet Take 150 mg by mouth every morning       simvastatin (ZOCOR) 20 MG tablet Take one tab daily       traMADol (ULTRAM) 50 MG tablet Take 50 mg by mouth every 6 hours as needed for pain       triamcinolone (KENALOG) 0.1 % external cream   0     venlafaxine (EFFEXOR) 75 MG tablet Take 75 mg by mouth 3 times daily       venlafaxine (EFFEXOR-XR) 150 MG 24 hr capsule Take 150 mg by mouth daily       VIIBRYD 20 MG TABS tablet 20 mg by Oral or Feeding Tube route daily        amphetamine-dextroamphetamine (ADDERALL) 20 MG per tablet Take 30 mg by mouth 2 times daily  (Patient not taking: Reported on 9/22/2023)       famotidine (PEPCID) 20 MG tablet Take 20 mg by mouth (Patient not taking: Reported on 9/22/2023)       mometasone (NASONEX) 50 MCG/ACT nasal spray 1 spray in each nostril once daily (Patient not taking: Reported on 9/10/2021)       No current facility-administered medications for this visit.         Allergies:  Dust Mite Extract   Penicillins   Sulfa Drugs     Past Medical History:  Encounter for long-term current use of medication  Lymphocytic colitis   Renal insufficiency   Hypokalemia   Diastolic dysfunction   Tobacco use disorder   Shortness of breath   Sjögren's syndrome   Vitamin D deficiency   Systemic lupus erythematosus   Migraines   Overweight  Primary osteoarthritis   Esophageal reflux   Anemia, unspecified   Attention deficit disorder without mention of hyperactivity   Generalized anxiety disorder   Episodic Mood Disorder   Hypothyroid   Hyperlipidemia    Allergic rhinitis seasonal    Hypertension     Past Surgical History:  Hysterectomy 1/1/1978 - 12/31/1978  (IA) ND Bone Graft Tib Fib FX W BMP 1/1/1992 - 12/31/1992    Bunionectomy, right   Knee replacement, left 1/1/2006 - 12/31/2006      Thumb(s) surgery  Knee replacement, right 6/1/2008 - 6/30/2008  Upper arm/elbow surgery 12/1/2008 - 12/31/2008       Colonoscopy screening 1/27/11   Bladder suspension 10/1/2005 - 10/31/2005       Colon surgery 6/1/2018 - 6/30/2018      Family History:    The patient's family history includes Sjogren's in her daughter; lupus (with more skin involvement) in her sister.  Son has extensive history of arterial blood clots    Social History:  The patient reports that she has quit smoking. She smoked 0.50 packs per day. She has never used smokeless tobacco. She reports current alcohol use. She reports that she does not use drugs. She is a retired .  PCP: Johnson Arnold  "MD EDGARDO  Marital Status:      Physical Exam:   BP (!) 171/74   Pulse 71   Ht 1.6 m (5' 3\")   Wt 87.5 kg (193 lb)   BMI 34.19 kg/m     Wt Readings from Last 4 Encounters:   06/14/24 87.5 kg (193 lb)   09/22/23 88 kg (194 lb)   05/20/22 93.3 kg (205 lb 11.2 oz)   09/10/21 92.7 kg (204 lb 6.4 oz)     Constitutional: Well-developed, appearing stated age; cooperative.  Eyes: Normal EOM, PERRLA, conjunctiva, sclera.  ENT: Normal external ears, nose, hearing, lips, gums; dentition poor; fissures over the tongue; moderate decrease in saliva pool  Neck: No visible mass or thyroid enlargement.  Resp: normal work of breathing  MS:  On the right hand, there is bony angulation in the PIP and DIP knuckles more so than left hand. Good range of motion of the wrist joint, no pain upon palpation of the joint space. Good fist formation. Elbows have normal extension and flexion, no effusion. Fatty changes on left knee secondary to knee replacement surgery, normal ROM, no effusion. Greater splay apparent between the phalanges and digits of the R foot than the L; R 5th digit MTP more prominent than L; fixed flexion of the 4th digit DIPs bilaterally.   Skin: No nail pitting, alopecia, rash, nodules or lesions.  Neuro: Grossly normal cranial nerves  Psych: Normal judgement, orientation, memory, affect.     Imaging:    Laboratory:       Latest Ref Rng & Units 6/9/2021     7:09 AM 6/9/2022     4:53 PM 9/22/2023     2:45 PM   RHEUM RESULTS   Complement C3 81 - 157 mg/dL  125  113    Complement C4 13 - 39 mg/dL  13  12    Creatinine 0.51 - 0.95 mg/dL   1.15    DNA-ds <10.0 IU/mL  0.9  <0.6    GFR Estimate >60 mL/min/1.73m2   49    Hematocrit 35.0 - 47.0 %   39.6    Hemoglobin 11.7 - 15.7 g/dL 10.3   12.5    WBC 4.0 - 11.0 10e3/uL   5.4    RBC Count 3.80 - 5.20 10e6/uL   4.13    RDW 10.0 - 15.0 %   13.2    MCHC 31.5 - 36.5 g/dL   31.6    MCV 78 - 100 fL   96    Platelet Count 150 - 450 10e3/uL   193    Rheumatoid Factor <12 IU/mL   6  "     Rheumatoid Factor   Date Value Ref Range Status   06/16/2010 10 0 - 14 IU/mL Final     Ribonucleic Protein IgG Antibody   Date Value Ref Range Status   06/16/2010 0  Final     Comment:     Reference range: 0 to 40  Unit: AU/mL  (Note)  REFERENCE INTERVAL: Ribonucleic Protein (SAMANTHA), IgG   29 AU/mL or Less ............. Negative   30 - 40 AU/mL ................ Equivocal   41 AU/mL or Greater .......... Positive    RNP antibody is seen in % of mixed connective tissue  disease and is considered specific for this syndrome if  other antibodies are negative. RNP is also present in  20-30% of systemic lupus erythematosus (SLE) and 15-25%  of progressive systemic sclerosis (PSS).  Performed by [facility],  [address] [place] [address],[place] [id number] [phone]  [email or web address], Virgen Casillas MD, Lab. Director     Cordero Antibody IgG   Date Value Ref Range Status   06/16/2010 0  Final     Comment:     Reference range: 0 to 40  Unit: AU/mL  (Note)  REFERENCE INTERVALS: SMITH (SAMANTHA) Ab, IgG   29 AU/mL or Less ............. Negative   30 - 40 AU/mL ................ Equivocal   41 AU/mL or Greater .......... Positive    Cordero antibody is very specific for systemic lupus  erythematosus (SLE) but only occurs in 30-35% of SLE  cases. The presence of antibodies to Cordero is often  associated with renal disease.  Performed by [facility],  [address] [facility],[place] [id number] [phone]  [email or web address], Virgen Casillas MD, Lab. Director     SSA (RO) Antibody IgG   Date Value Ref Range Status   06/16/2010 4  Final     Comment:     Reference range: 0 to 40  Unit: AU/mL  (Note)  REFERENCE INTERVALS: SSA (Ro) (SAMANTHA) Ab, IgG   29 AU/mL or Less ............. Negative   30 - 40 AU/mL ................ Equivocal   41 AU/mL or Greater .......... Positive    SSA (Ro) antibody is seen in 70-75% of Sjogren syndrome  cases, 30-40% of systemic lupus erythematosus (SLE) and  5-10% of progressive systemic sclerosis (PSS).  Performed by  [facility],  [address] [facility] [address] [phone]  www.[email or web address], Virgen Casillas MD, Lab. Director     SSB (LA) Antibody IgG   Date Value Ref Range Status   06/16/2010 0  Final     Comment:     Reference range: 0 to 40  Unit: AU/mL  (Note)  REFERENCE INTERVALS: SSB (La) (SAMANTHA) Ab, IgG   29 AU/mL or Less ............. Negative   30 - 40 AU/mL ................ Equivocal   41 AU/mL or Greater .......... Positive    SSB (La) antibody is seen in 50-60% of Sjogren syndrome  cases and is specific if it is the only SAMANTHA antibody  present. 15-25% of patients with systemic lupus  erythematosus (SLE) and 5-10% of patients with progressive  systemic sclerosis (PSS) also have this antibody.  Performed by [facility],  [address] [facility] [address] [phone]  www.[email or web address], Virgen Casillas MD, Lab. Director     KATIE Screen by E[facility]   Date Value Ref Range Status   06/16/2010 1.2 (H) 0 - 1.0 Final     Comment:     Interpretation:  Weakly Positive     DNA-ds   Date Value Ref Range Status   10/04/2018 1 <10 IU/mL Final     Comment:     Negative     Hep B Surface Agn   Date Value Ref Range Status   10/12/2011 Negative NEG Final     Albumin Fraction   Date Value Ref Range Status   12/22/2016 4.1 3.7 - 5.1 g/dL Final     Alpha 2 Fraction   Date Value Ref Range Status   12/22/2016 0.9 0.5 - 0.9 g/dL Final     Beta Fraction   Date Value Ref Range Status   12/22/2016 0.8 0.6 - 1.0 g/dL Final     Gamma Fraction   Date Value Ref Range Status   12/22/2016 0.8 0.7 - 1.6 g/dL Final     Monoclonal Peak   Date Value Ref Range Status   12/22/2016 0.0 0.0 g/dL Final     ELP Interpretation:   Date Value Ref Range Status   12/22/2016   Final    Essentially normal electrophoretic pattern.  No monoclonal protein seen.   Pathologic significance requires clinical correlation.  ISIDRO Murguia M.D.,   Ph.D., Pathologist ().       [facility]  Rheumatology Clinic  Deng Pruett MD  06/14/2024     Name: Shu BENITO  Donita  MRN: [id number]  Age: 78 year old  : 1945  Referring provider: Referred Self    Assessment and Plan:  # Systemic lupus erythematosus with secondary Sjögren's syndrome (malar rash, arthralgia, sicca symptoms, +KATIE, +dsDNA, hypocomplementemia, +Schirmer's test):  Mrs. Duckworth reports ongoing diffuse joint pain, low-grade in character, and improved with activity.  She reports maintaining of vigorous physical activity schedule with daily gardening despite the pain.  Physical exam shows osteoarthritis changes in the right significantly more so than in the left hand fist formation and  strength are normal.    Laboratory evaluation in 2021 revealed hemoglobin of 10.3; urinalysis was clear; creatinine was 1.11 with a GFR of 58.    Systemic lupus erythematosus is quiescent. I recommend continuing Plaquenil monotherapy 400 mg daily. While on the medication, patient should have annual ophthalmology evaluation for retinal toxicity. Most recent exam per her report was conducted in 2021:    Plan:  1.  Continue Plaquenil 400 mg daily.  While on Plaquenil, undergo annual ophthalmology evaluation for rare retinal toxicity.  2.  Repeat urinalysis, complete blood count, and creatinine at your convenience.  3.  Continue acetaminophen 1000 mg to 2000 mg daily for residual joint pain.  4.  Continue aerobic exercise daily to maintain joint and muscle health.  5. Continue symptomatic treatment for mild sicca and dry eye symptoms.  6. Agree that weight loss, diet and exercise will all help with joint symptoms      # L leg swelling: No further swelling, however she is having migratory pain that is working its way up her leg; doppler ultrasounds of the left lower leg to rule out thrombosis in  were negative. Physical exam shows no joint space pathology. Trochanteric pain syndrome is most likely due to bursitis/tenosynovitis/muscle pain and will likely improve spontaneosly over time. She was advised to  continue use of tylenol and tramadol for pain and exercise/weight loss.  I recommend heat 20 minutes twice daily, gentle stretching and exercises for the lateral thigh.  Corticosteroid injection could be supplied if improvement is not occurring steadily in the next 3 to 4 weeks. Use acetaminophen 500 to 1000 mg up to 3 times daily for joint pain.      # Osteoarthritis, thumbs:  No indication for immunomodulatory therapy.  # Ulnar neuropathy, L;  Symptoms reflect post-operative change, following L elbow replacement in 2018. Stable.  #Advanced dental caries and periodontitis: I emphasized the importance of dental hygiene to control of systemic inflammatory symptoms.  I recommended evaluation in dentistry.    Return to clinic in 6 months.     Orders:  Orders Placed This Encounter   Procedures     Complement C3     Complement C4     CRP inflammation     Routine UA with Micro Reflex to Culture     Creatinine     Follow-up: Return in about 6 months (around 3/10/2022)    I was present with the medical student who participated in the service and in the documentation of the note. I have verified the history and personally performed the physical exam and medical decision making. I agree with the assessment and plan of care as documented in the note.    Deng Pruett M.D.  Staff Rheumatologist, [facility]  Pager [phone]      HPI:   Shu Duckworth has a history including systemic lupus erythematosus with secondary Sjögren's syndrome (+sicca symptoms, +Schirmer test, -minor salivary gland biopsy) who presents for follow-up. The patient was last evaluated in 6-2020 in follow up of lupus. Symptoms were overall judged reasonably controlled on Plaquenil. Plaquenil was continued at 400 mg a day.    Interval history September 22, 2023      Interval history 09-:  Patient was seen in ophthalmology on August 23, 2021.  Impression was of long-term use of Plaquenil; no evidence of retinal Plaquenil toxicity. She has been on  plaquenil since 2010, 400 mg/day. Today she states she is having no lupus symptoms currently. She is still having osteoarthritis in her shoulders, hands and knees. She also states she is having generalized muscle and joint pain, however the muscle pain is worse in her legs and upper thighs, this pain started in the bottom of her left leg and has moved up over the past three months, it is sharp in character. She has no personal history of blood clots, but her son has had many blood clots. She says that she had her left shoulder replaced in June of 2021, however she says her right may need to be replaced soon. She has had both knees replaced and says they need to be replaced again. She says that it takes a couple of hours for her joints to loosen up. She is also having some neck pain that has gotten worse over the past year. Her sicca symptoms have been stable.    She says she has gained around 30 lbs back since COVID started due to her gym being shut down and not able to exercise as much.      No new or persistent headache.  Some new hair thinning.  No change in vision or hearing.  No inflammatory eye disease history.  No history of miscarriage.  No history of blood clots.  Some dry eyes or dry mouth.  No facial rash.  No photosensitive rash.  No personal or first-degree relatives with psoriasis.  No nasal or oral ulcers.  No recurrent epistaxis or hemoptysis.  No recurrent sinusitis or recurrent pneumonia.  No chest pain, some shortness of breath (since 2010).  No abdominal pain, constipation, some diarrhea.  No blood in stool or black tarry stools.  No nausea or vomiting at baseline, sometimes nauseous with diarrhea.  No foamy or frothy urine.  No bowel or bladder incontinence.  No change in strength or sensation in the arms or legs. Some history of raynauds.  No skin thickening or tightening consistent with sclerodactyly.  No symptoms consistent with dactylitis.  No red hot or swollen joints.  some joint pain.   some joint stiffness.  Muscle pain, in both legs, problems with using her legs, some weakness, falling easily. No fevers, chills, weight loss or night sweats.      Interval history 06-:    She continues with diffuse pain; the sides of her feet, muscles, and joints.   She notes swelling in her R hand, constant, not affected by activity.     She has been gardening frequently; she is able to grasp and pull weeds. She opens jars fine.  She takes acetaminopehn 500 mg 2-4 tabs per day. +significant relief from joint pain, temporary.    She notes some balance/insteadiness with getting up from a chair, or with rapid turning. No vertigo; minimal dizziness normally.       Review of Systems:   Pertinent items are noted in HPI, remainder of complete ROS is negative.        Active Medications:   Current Outpatient Medications   Medication Sig Dispense Refill     Acetaminophen (TYLENOL PO) Take 1,000 mg by mouth every 6 hours as needed for mild pain or fever       atenolol (TENORMIN) 25 MG tablet 2 tablets daily One in the morning, one at bedtime.       Cholecalciferol (VITAMIN D3) 1000 units CAPS Take 1,000 Units by mouth daily       ClonAZEPAM (KLONOPIN) 0.5 MG tablet Take 1 tab during day and 2 at bedtime as needed (max of 3 tabs per 24 hours)       cyclobenzaprine (FLEXERIL) 10 MG tablet Take 1 tablet 3 times daily as needed       fluticasone (FLONASE) 50 MCG/ACT nasal spray SPRAY 1 SPRAY INTO EACH NOSTRIL EVERY DAY  33     hydrochlorothiazide (HYDRODIURIL) 50 MG tablet Take 25 mg by mouth daily.       hydroxychloroquine (PLAQUENIL) 200 MG tablet TAKE 1 TABLET BY MOUTH TWICE A DAY. Annual eye exam/plaquenil screening for refills, overdue. Please do now. Thank you. 180 tablet 3     levothyroxine (SYNTHROID/LEVOTHROID) 100 MCG tablet Take 100 mcg by mouth daily  3     metoprolol succinate (TOPROL-XL) 50 MG 24 hr tablet Take 50 mg by mouth daily       Multiple Vitamins-Minerals (CENTRUM SILVER PO) Take  by mouth.        pantoprazole (PROTONIX) 40 MG enteric coated tablet Take 1 tablet by mouth 2 times daily       potassium chloride ER (K-DUR/KLOR-CON M) 20 MEQ CR tablet TAKE 1 TABLET BY MOUTH EVERY DAY WITH FOOD  1     ranitidine (ZANTAC) 150 MG tablet Take one at bedtime daily       rOPINIRole (REQUIP) 1 MG tablet Take 1 mg in afternoon and 5 mg at bedtime       rOPINIRole (REQUIP) 5 MG tablet TAKE 1 TABLET BY MOUTH EVERY DAY IN THE EVENING  3     sertraline (ZOLOFT) 100 MG tablet Take 150 mg by mouth every morning       simvastatin (ZOCOR) 20 MG tablet Take one tab daily       traMADol (ULTRAM) 50 MG tablet Take 50 mg by mouth every 6 hours as needed for pain       triamcinolone (KENALOG) 0.1 % external cream   0     venlafaxine (EFFEXOR) 75 MG tablet Take 75 mg by mouth 3 times daily       venlafaxine (EFFEXOR-XR) 150 MG 24 hr capsule Take 150 mg by mouth daily       VIIBRYD 20 MG TABS tablet 20 mg by Oral or Feeding Tube route daily       amphetamine-dextroamphetamine (ADDERALL) 20 MG per tablet Take 30 mg by mouth 2 times daily  (Patient not taking: Reported on 9/22/2023)       famotidine (PEPCID) 20 MG tablet Take 20 mg by mouth (Patient not taking: Reported on 9/22/2023)       mometasone (NASONEX) 50 MCG/ACT nasal spray 1 spray in each nostril once daily (Patient not taking: Reported on 9/10/2021)       No current facility-administered medications for this visit.       Allergies:  Dust Mite Extract   Penicillins   Sulfa Drugs     Past Medical History:  Encounter for long-term current use of medication  Lymphocytic colitis   Renal insufficiency   Hypokalemia   Diastolic dysfunction   Tobacco use disorder   Shortness of breath   Sjögren's syndrome   Vitamin D deficiency   Systemic lupus erythematosus   Migraines   Overweight  Primary osteoarthritis   Esophageal reflux   Anemia, unspecified   Attention deficit disorder without mention of hyperactivity   Generalized anxiety disorder   Episodic Mood Disorder   Hypothyroid  "  Hyperlipidemia    Allergic rhinitis seasonal    Hypertension     Past Surgical History:  Hysterectomy 1/1/1978 - 12/31/1978  (IA) DC Bone Graft Tib Fib FX W BMP 1/1/1992 - 12/31/1992    Bunionectomy, right   Knee replacement, left 1/1/2006 - 12/31/2006      Thumb(s) surgery  Knee replacement, right 6/1/2008 - 6/30/2008  Upper arm/elbow surgery 12/1/2008 - 12/31/2008       Colonoscopy screening 1/27/11   Bladder suspension 10/1/2005 - 10/31/2005       Colon surgery 6/1/2018 - 6/30/2018      Family History:    The patient's family history includes Sjogren's in her daughter; lupus (with more skin involvement) in her sister.  Son has extensive history of arterial blood clots    Social History:  The patient reports that she has quit smoking. She smoked 0.50 packs per day. She has never used smokeless tobacco. She reports current alcohol use. She reports that she does not use drugs. She is a retired .  PCP: Johnson Arnold MD  Marital Status:      Physical Exam:   BP (!) 171/74   Pulse 71   Ht 1.6 m (5' 3\")   Wt 87.5 kg (193 lb)   BMI 34.19 kg/m     Wt Readings from Last 4 Encounters:   06/14/24 87.5 kg (193 lb)   09/22/23 88 kg (194 lb)   05/20/22 93.3 kg (205 lb 11.2 oz)   09/10/21 92.7 kg (204 lb 6.4 oz)     Constitutional: Well-developed, appearing stated age; cooperative.  Eyes: Normal EOM, PERRLA, vision, conjunctiva, sclera.  ENT: Normal external ears, nose, hearing, lips, teeth, gums, normal saliva pool  Neck: No mass or thyroid enlargement.  Cardio: RRR, no murmurs rubs or gallops  Resp: lungs CTA bilaterally at bases of lungs, normal work of breathing  MS:  On the right hand, there is bony angulation in the knuckles more so than left hand, some swelling of the left wrist, Good range of motion of the wrist joint, no pain upon palpation of the joint space. Good fist formation. Elbows have normal extension and flexion, no effusion. Fatty changes on left knee secondary to knee " replacement surgery, normal ROM, no effusion. Hips have normal ROM bilaterally (interal, external rotation and flexion/extension), pain upon palpation of left leg around greater head of the trochanter.  Skin: No nail pitting, alopecia, rash, nodules or lesions.  Neuro: Normal cranial nerves  Psych: Normal judgement, orientation, memory, affect.     Imaging:    Laboratory:       Latest Ref Rng & Units 6/9/2021     7:09 AM 6/9/2022     4:53 PM 9/22/2023     2:45 PM   RHEUM RESULTS   Complement C3 81 - 157 mg/dL  125  113    Complement C4 13 - 39 mg/dL  13  12    Creatinine 0.51 - 0.95 mg/dL   1.15    DNA-ds <10.0 IU/mL  0.9  <0.6    GFR Estimate >60 mL/min/1.73m2   49    Hematocrit 35.0 - 47.0 %   39.6    Hemoglobin 11.7 - 15.7 g/dL 10.3   12.5    WBC 4.0 - 11.0 10e3/uL   5.4    RBC Count 3.80 - 5.20 10e6/uL   4.13    RDW 10.0 - 15.0 %   13.2    MCHC 31.5 - 36.5 g/dL   31.6    MCV 78 - 100 fL   96    Platelet Count 150 - 450 10e3/uL   193    Rheumatoid Factor <12 IU/mL   6      Rheumatoid Factor   Date Value Ref Range Status   06/16/2010 10 0 - 14 IU/mL Final     Ribonucleic Protein IgG Antibody   Date Value Ref Range Status   06/16/2010 0  Final     Comment:     Reference range: 0 to 40  Unit: AU/mL  (Note)  REFERENCE INTERVAL: Ribonucleic Protein (SAMANTHA), IgG   29 AU/mL or Less ............. Negative   30 - 40 AU/mL ................ Equivocal   41 AU/mL or Greater .......... Positive    RNP antibody is seen in % of mixed connective tissue  disease and is considered specific for this syndrome if  other antibodies are negative. RNP is also present in  20-30% of systemic lupus erythematosus (SLE) and 15-25%  of progressive systemic sclerosis (PSS).  Performed by [facility],  [address] [place] [address],[place] [address] [phone]  [email or web address], Virgen Casillas MD, Lab. Director     Cordero Antibody IgG   Date Value Ref Range Status   06/16/2010 0  Final     Comment:     Reference range: 0 to 40  Unit:  AU/mL  (Note)  REFERENCE INTERVALS: SMITH (SAMANTHA) Ab, IgG   29 AU/mL or Less ............. Negative   30 - 40 AU/mL ................ Equivocal   41 AU/mL or Greater .......... Positive    Cordero antibody is very specific for systemic lupus  erythematosus (SLE) but only occurs in 30-35% of SLE  cases. The presence of antibodies to Cordero is often  associated with renal disease.  Performed by [facility],  [address] [facility],[place] [id number] [phone]  [email or web address], Virgen Casillas MD, Lab. Director     SSA (RO) Antibody IgG   Date Value Ref Range Status   06/16/2010 4  Final     Comment:     Reference range: 0 to 40  Unit: AU/mL  (Note)  REFERENCE INTERVALS: SSA (Ro) (SAMANTHA) Ab, IgG   29 AU/mL or Less ............. Negative   30 - 40 AU/mL ................ Equivocal   41 AU/mL or Greater .......... Positive    SSA (Ro) antibody is seen in 70-75% of Sjogren syndrome  cases, 30-40% of systemic lupus erythematosus (SLE) and  5-10% of progressive systemic sclerosis (PSS).  Performed by [facility],  [address] [place], [place] [id number] [phone]  [email or web address], Virgen Casillas MD, Lab. Director     SSB (LA) Antibody IgG   Date Value Ref Range Status   06/16/2010 0  Final     Comment:     Reference range: 0 to 40  Unit: AU/mL  (Note)  REFERENCE INTERVALS: SSB (La) (SAMANTHA) Ab, IgG   29 AU/mL or Less ............. Negative   30 - 40 AU/mL ................ Equivocal   41 AU/mL or Greater .......... Positive    SSB (La) antibody is seen in 50-60% of Sjogren syndrome  cases and is specific if it is the only SAMANTHA antibody  present. 15-25% of patients with systemic lupus  erythematosus (SLE) and 5-10% of patients with progressive  systemic sclerosis (PSS) also have this antibody.  Performed by [facility],  [address] [place] [address] [phone]  [email or web address], Virgen Casillas MD, Lab. Director     KATIE Screen by [facility]   Date Value Ref Range Status   06/16/2010 1.2 (H) 0 - 1.0 Final     Comment:      Interpretation:  Weakly Positive     DNA-ds   Date Value Ref Range Status   10/04/2018 1 <10 IU/mL Final     Comment:     Negative     Hep B Surface Agn   Date Value Ref Range Status   10/12/2011 Negative NEG Final     Albumin Fraction   Date Value Ref Range Status   12/22/2016 4.1 3.7 - 5.1 g/dL Final     Alpha 2 Fraction   Date Value Ref Range Status   12/22/2016 0.9 0.5 - 0.9 g/dL Final     Beta Fraction   Date Value Ref Range Status   12/22/2016 0.8 0.6 - 1.0 g/dL Final     Gamma Fraction   Date Value Ref Range Status   12/22/2016 0.8 0.7 - 1.6 g/dL Final     Monoclonal Peak   Date Value Ref Range Status   12/22/2016 0.0 0.0 g/dL Final     ELP Interpretation:   Date Value Ref Range Status   12/22/2016   Final    Essentially normal electrophoretic pattern.  No monoclonal protein seen.   Pathologic significance requires clinical correlation.  ISIDRO Murguia M.D.,   Ph.D., Pathologist ().           Again, thank you for allowing me to participate in the care of your patient.      Sincerely,    Deng Pruett MD

## 2024-06-17 ENCOUNTER — TELEPHONE (OUTPATIENT)
Dept: RHEUMATOLOGY | Facility: CLINIC | Age: 79
End: 2024-06-17

## 2024-06-17 ENCOUNTER — LAB (OUTPATIENT)
Dept: LAB | Facility: CLINIC | Age: 79
End: 2024-06-17
Payer: COMMERCIAL

## 2024-06-17 DIAGNOSIS — M32.9 SYSTEMIC LUPUS ERYTHEMATOSUS, UNSPECIFIED SLE TYPE, UNSPECIFIED ORGAN INVOLVEMENT STATUS (H): ICD-10-CM

## 2024-06-17 LAB
ALBUMIN UR-MCNC: NEGATIVE MG/DL
APPEARANCE UR: CLEAR
BACTERIA #/AREA URNS HPF: ABNORMAL /HPF
BASOPHILS # BLD AUTO: 0 10E3/UL (ref 0–0.2)
BASOPHILS NFR BLD AUTO: 1 %
BILIRUB UR QL STRIP: NEGATIVE
COLOR UR AUTO: YELLOW
CREAT SERPL-MCNC: 1.12 MG/DL (ref 0.51–0.95)
CRP SERPL-MCNC: <3 MG/L
EGFRCR SERPLBLD CKD-EPI 2021: 50 ML/MIN/1.73M2
EOSINOPHIL # BLD AUTO: 0 10E3/UL (ref 0–0.7)
EOSINOPHIL NFR BLD AUTO: 0 %
ERYTHROCYTE [DISTWIDTH] IN BLOOD BY AUTOMATED COUNT: 13.2 % (ref 10–15)
GLUCOSE UR STRIP-MCNC: NEGATIVE MG/DL
HCT VFR BLD AUTO: 39.3 % (ref 35–47)
HGB BLD-MCNC: 12.3 G/DL (ref 11.7–15.7)
HGB UR QL STRIP: NEGATIVE
IMM GRANULOCYTES # BLD: 0 10E3/UL
IMM GRANULOCYTES NFR BLD: 0 %
KETONES UR STRIP-MCNC: NEGATIVE MG/DL
LEUKOCYTE ESTERASE UR QL STRIP: ABNORMAL
LYMPHOCYTES # BLD AUTO: 1.3 10E3/UL (ref 0.8–5.3)
LYMPHOCYTES NFR BLD AUTO: 24 %
MCH RBC QN AUTO: 30.1 PG (ref 26.5–33)
MCHC RBC AUTO-ENTMCNC: 31.3 G/DL (ref 31.5–36.5)
MCV RBC AUTO: 96 FL (ref 78–100)
MONOCYTES # BLD AUTO: 0.2 10E3/UL (ref 0–1.3)
MONOCYTES NFR BLD AUTO: 5 %
NEUTROPHILS # BLD AUTO: 3.8 10E3/UL (ref 1.6–8.3)
NEUTROPHILS NFR BLD AUTO: 71 %
NITRATE UR QL: NEGATIVE
PH UR STRIP: 5.5 [PH] (ref 5–8)
PLATELET # BLD AUTO: 196 10E3/UL (ref 150–450)
RBC # BLD AUTO: 4.08 10E6/UL (ref 3.8–5.2)
RBC #/AREA URNS AUTO: ABNORMAL /HPF
SP GR UR STRIP: >=1.03 (ref 1–1.03)
SQUAMOUS #/AREA URNS AUTO: ABNORMAL /LPF
UROBILINOGEN UR STRIP-ACNC: 0.2 E.U./DL
WBC # BLD AUTO: 5.3 10E3/UL (ref 4–11)
WBC #/AREA URNS AUTO: ABNORMAL /HPF

## 2024-06-17 PROCEDURE — 81001 URINALYSIS AUTO W/SCOPE: CPT

## 2024-06-17 PROCEDURE — 86160 COMPLEMENT ANTIGEN: CPT

## 2024-06-17 PROCEDURE — 82565 ASSAY OF CREATININE: CPT

## 2024-06-17 PROCEDURE — 36415 COLL VENOUS BLD VENIPUNCTURE: CPT

## 2024-06-17 PROCEDURE — 86140 C-REACTIVE PROTEIN: CPT

## 2024-06-17 PROCEDURE — 85025 COMPLETE CBC W/AUTO DIFF WBC: CPT

## 2024-06-17 PROCEDURE — 87086 URINE CULTURE/COLONY COUNT: CPT

## 2024-06-17 NOTE — TELEPHONE ENCOUNTER
Patient confirmed scheduled appointment:  Date: June 6th, 2025  Time: 10:30  Visit type: Return Rheumatology  Provider: Dr. Pruett  [place] [facility] [place]: Labs done June 17th, 2024  Additional notes: Scheduled further out per pt request as she will be [place] October through May

## 2024-06-18 LAB
C3 SERPL-MCNC: 109 MG/DL (ref 81–157)
C4 SERPL-MCNC: 9 MG/DL (ref 13–39)

## 2024-06-19 LAB — BACTERIA UR CULT: NORMAL

## 2024-07-15 DIAGNOSIS — M32.9 SYSTEMIC LUPUS ERYTHEMATOSUS, UNSPECIFIED SLE TYPE, UNSPECIFIED ORGAN INVOLVEMENT STATUS (H): ICD-10-CM

## 2024-07-15 RX ORDER — HYDROXYCHLOROQUINE SULFATE 200 MG/1
TABLET, FILM COATED ORAL
Qty: 180 TABLET | Refills: 3 | Status: SHIPPED | OUTPATIENT
Start: 2024-07-15

## 2024-07-15 NOTE — TELEPHONE ENCOUNTER
Requested Prescriptions   Pending Prescriptions Disp Refills    hydroxychloroquine (PLAQUENIL) 200 MG tablet 180 tablet 3     Sig: TAKE 1 TABLET BY MOUTH TWICE A DAY. Annual eye exam/plaquenil screening for refills, overdue. Please do now. Thank you.       There is no refill protocol information for this order            Last Written Prescription Date:  6/14/2024  Last Fill Quantity: 180 tablet,  # refills: 3   Last office visit: Visit date not found ; last virtual visit: Visit date not found with prescribing provider:  Deng Pruett MD    Future Office Visit: 06/06/2025

## 2024-07-15 NOTE — TELEPHONE ENCOUNTER
Last eye exam 10/5/23.  [facility].  This is to [place] pharmacy [place].  I cannot refill this per protocol. Previous RX was sent to local pharmacy    Last Written Prescription Date:  6/14/24  Last Fill Quantity: 180,  # refills: 3   Last office visit: Visit date not found ; last virtual visit: Visit date not found with prescribing provider:  Flynn   Future Office Visit:  6/6/25    Requested Prescriptions   Pending Prescriptions Disp Refills    hydroxychloroquine (PLAQUENIL) 200 MG tablet 180 tablet 3     Sig: TAKE 1 TABLET BY MOUTH TWICE A DAY.       There is no refill protocol information for this order              Carolina Castano RN

## 2024-08-04 ENCOUNTER — HEALTH MAINTENANCE LETTER (OUTPATIENT)
Age: 79
End: 2024-08-04

## 2024-10-02 ENCOUNTER — TRANSFERRED RECORDS (OUTPATIENT)
Dept: HEALTH INFORMATION MANAGEMENT | Facility: CLINIC | Age: 79
End: 2024-10-02
Payer: COMMERCIAL

## 2025-08-06 DIAGNOSIS — M32.9 SYSTEMIC LUPUS ERYTHEMATOSUS, UNSPECIFIED SLE TYPE, UNSPECIFIED ORGAN INVOLVEMENT STATUS (H): ICD-10-CM

## 2025-08-07 RX ORDER — HYDROXYCHLOROQUINE SULFATE 200 MG/1
TABLET, FILM COATED ORAL
Qty: 180 TABLET | Refills: 0 | Status: SHIPPED | OUTPATIENT
Start: 2025-08-07

## 2025-08-16 ENCOUNTER — HEALTH MAINTENANCE LETTER (OUTPATIENT)
Age: 80
End: 2025-08-16